# Patient Record
Sex: FEMALE | Race: OTHER | HISPANIC OR LATINO | URBAN - METROPOLITAN AREA
[De-identification: names, ages, dates, MRNs, and addresses within clinical notes are randomized per-mention and may not be internally consistent; named-entity substitution may affect disease eponyms.]

---

## 2019-02-01 ENCOUNTER — OUTPATIENT (OUTPATIENT)
Dept: OUTPATIENT SERVICES | Facility: HOSPITAL | Age: 59
LOS: 1 days | End: 2019-02-01
Payer: MEDICAID

## 2019-02-01 PROCEDURE — G9001: CPT

## 2019-02-20 ENCOUNTER — INPATIENT (INPATIENT)
Facility: HOSPITAL | Age: 59
LOS: 6 days | Discharge: HOME | DRG: 24 | End: 2019-02-27
Attending: INTERNAL MEDICINE | Admitting: INTERNAL MEDICINE
Payer: MEDICAID

## 2019-02-20 VITALS
SYSTOLIC BLOOD PRESSURE: 133 MMHG | DIASTOLIC BLOOD PRESSURE: 82 MMHG | RESPIRATION RATE: 18 BRPM | OXYGEN SATURATION: 98 % | HEART RATE: 53 BPM | WEIGHT: 167.99 LBS | TEMPERATURE: 98 F

## 2019-02-20 DIAGNOSIS — Z29.9 ENCOUNTER FOR PROPHYLACTIC MEASURES, UNSPECIFIED: ICD-10-CM

## 2019-02-20 DIAGNOSIS — Z90.49 ACQUIRED ABSENCE OF OTHER SPECIFIED PARTS OF DIGESTIVE TRACT: Chronic | ICD-10-CM

## 2019-02-20 DIAGNOSIS — F17.200 NICOTINE DEPENDENCE, UNSPECIFIED, UNCOMPLICATED: ICD-10-CM

## 2019-02-20 DIAGNOSIS — I10 ESSENTIAL (PRIMARY) HYPERTENSION: ICD-10-CM

## 2019-02-20 DIAGNOSIS — R20.0 ANESTHESIA OF SKIN: ICD-10-CM

## 2019-02-20 DIAGNOSIS — I25.10 ATHEROSCLEROTIC HEART DISEASE OF NATIVE CORONARY ARTERY WITHOUT ANGINA PECTORIS: ICD-10-CM

## 2019-02-20 DIAGNOSIS — I48.91 UNSPECIFIED ATRIAL FIBRILLATION: ICD-10-CM

## 2019-02-20 LAB
ALBUMIN SERPL ELPH-MCNC: 4.5 G/DL — SIGNIFICANT CHANGE UP (ref 3.3–5)
ALP SERPL-CCNC: 68 U/L — SIGNIFICANT CHANGE UP (ref 40–120)
ALT FLD-CCNC: 13 U/L — SIGNIFICANT CHANGE UP (ref 10–45)
ANION GAP SERPL CALC-SCNC: 13 MMOL/L — SIGNIFICANT CHANGE UP (ref 5–17)
APTT BLD: 32.1 SEC — SIGNIFICANT CHANGE UP (ref 27.5–36.3)
AST SERPL-CCNC: 18 U/L — SIGNIFICANT CHANGE UP (ref 10–40)
BASOPHILS # BLD AUTO: 0.1 K/UL — SIGNIFICANT CHANGE UP (ref 0–0.2)
BASOPHILS NFR BLD AUTO: 1.2 % — SIGNIFICANT CHANGE UP (ref 0–2)
BILIRUB SERPL-MCNC: 0.2 MG/DL — SIGNIFICANT CHANGE UP (ref 0.2–1.2)
BUN SERPL-MCNC: 13 MG/DL — SIGNIFICANT CHANGE UP (ref 7–23)
CALCIUM SERPL-MCNC: 9.5 MG/DL — SIGNIFICANT CHANGE UP (ref 8.4–10.5)
CHLORIDE SERPL-SCNC: 103 MMOL/L — SIGNIFICANT CHANGE UP (ref 96–108)
CO2 SERPL-SCNC: 25 MMOL/L — SIGNIFICANT CHANGE UP (ref 22–31)
CREAT SERPL-MCNC: 0.6 MG/DL — SIGNIFICANT CHANGE UP (ref 0.5–1.3)
EOSINOPHIL # BLD AUTO: 0.1 K/UL — SIGNIFICANT CHANGE UP (ref 0–0.5)
EOSINOPHIL NFR BLD AUTO: 1.7 % — SIGNIFICANT CHANGE UP (ref 0–6)
GLUCOSE SERPL-MCNC: 106 MG/DL — HIGH (ref 70–99)
HCG SERPL-ACNC: <2 MIU/ML — SIGNIFICANT CHANGE UP
HCT VFR BLD CALC: 35.9 % — SIGNIFICANT CHANGE UP (ref 34.5–45)
HGB BLD-MCNC: 12.4 G/DL — SIGNIFICANT CHANGE UP (ref 11.5–15.5)
INR BLD: 0.97 RATIO — SIGNIFICANT CHANGE UP (ref 0.88–1.16)
LYMPHOCYTES # BLD AUTO: 2.8 K/UL — SIGNIFICANT CHANGE UP (ref 1–3.3)
LYMPHOCYTES # BLD AUTO: 48.2 % — HIGH (ref 13–44)
MCHC RBC-ENTMCNC: 30.1 PG — SIGNIFICANT CHANGE UP (ref 27–34)
MCHC RBC-ENTMCNC: 34.6 GM/DL — SIGNIFICANT CHANGE UP (ref 32–36)
MCV RBC AUTO: 86.9 FL — SIGNIFICANT CHANGE UP (ref 80–100)
MONOCYTES # BLD AUTO: 0.4 K/UL — SIGNIFICANT CHANGE UP (ref 0–0.9)
MONOCYTES NFR BLD AUTO: 7.2 % — SIGNIFICANT CHANGE UP (ref 2–14)
NEUTROPHILS # BLD AUTO: 2.4 K/UL — SIGNIFICANT CHANGE UP (ref 1.8–7.4)
NEUTROPHILS NFR BLD AUTO: 41.8 % — LOW (ref 43–77)
PLATELET # BLD AUTO: 168 K/UL — SIGNIFICANT CHANGE UP (ref 150–400)
POTASSIUM SERPL-MCNC: 3.9 MMOL/L — SIGNIFICANT CHANGE UP (ref 3.5–5.3)
POTASSIUM SERPL-SCNC: 3.9 MMOL/L — SIGNIFICANT CHANGE UP (ref 3.5–5.3)
PROT SERPL-MCNC: 7 G/DL — SIGNIFICANT CHANGE UP (ref 6–8.3)
PROTHROM AB SERPL-ACNC: 11.2 SEC — SIGNIFICANT CHANGE UP (ref 10–12.9)
RBC # BLD: 4.13 M/UL — SIGNIFICANT CHANGE UP (ref 3.8–5.2)
RBC # FLD: 11.8 % — SIGNIFICANT CHANGE UP (ref 10.3–14.5)
SODIUM SERPL-SCNC: 141 MMOL/L — SIGNIFICANT CHANGE UP (ref 135–145)
TROPONIN T, HIGH SENSITIVITY RESULT: <6 NG/L — SIGNIFICANT CHANGE UP (ref 0–51)
WBC # BLD: 5.8 K/UL — SIGNIFICANT CHANGE UP (ref 3.8–10.5)
WBC # FLD AUTO: 5.8 K/UL — SIGNIFICANT CHANGE UP (ref 3.8–10.5)

## 2019-02-20 PROCEDURE — 99406 BEHAV CHNG SMOKING 3-10 MIN: CPT

## 2019-02-20 PROCEDURE — 99285 EMERGENCY DEPT VISIT HI MDM: CPT | Mod: 25

## 2019-02-20 PROCEDURE — 99223 1ST HOSP IP/OBS HIGH 75: CPT | Mod: 25

## 2019-02-20 PROCEDURE — 93010 ELECTROCARDIOGRAM REPORT: CPT

## 2019-02-20 PROCEDURE — 70450 CT HEAD/BRAIN W/O DYE: CPT | Mod: 26

## 2019-02-20 RX ORDER — ACETAMINOPHEN 500 MG
650 TABLET ORAL EVERY 6 HOURS
Qty: 0 | Refills: 0 | Status: DISCONTINUED | OUTPATIENT
Start: 2019-02-20 | End: 2019-02-27

## 2019-02-20 RX ORDER — HEPARIN SODIUM 5000 [USP'U]/ML
5000 INJECTION INTRAVENOUS; SUBCUTANEOUS EVERY 12 HOURS
Qty: 0 | Refills: 0 | Status: DISCONTINUED | OUTPATIENT
Start: 2019-02-20 | End: 2019-02-27

## 2019-02-20 RX ORDER — ASPIRIN/CALCIUM CARB/MAGNESIUM 324 MG
81 TABLET ORAL DAILY
Qty: 0 | Refills: 0 | Status: DISCONTINUED | OUTPATIENT
Start: 2019-02-20 | End: 2019-02-22

## 2019-02-20 RX ORDER — FAMOTIDINE 10 MG/ML
20 INJECTION INTRAVENOUS
Qty: 0 | Refills: 0 | Status: DISCONTINUED | OUTPATIENT
Start: 2019-02-20 | End: 2019-02-27

## 2019-02-20 RX ORDER — ATORVASTATIN CALCIUM 80 MG/1
80 TABLET, FILM COATED ORAL AT BEDTIME
Qty: 0 | Refills: 0 | Status: DISCONTINUED | OUTPATIENT
Start: 2019-02-20 | End: 2019-02-27

## 2019-02-20 NOTE — ED ADULT NURSE NOTE - OBJECTIVE STATEMENT
Pt presents to ED awake and alert, brought in by EMS from cardiologist's office. Pt states on Friday (5 days ago) she noticed her tongue was numb. This progressed to right sided facial numbness- pt states it feels like her face is sleeping. Progressed into right arm numbness and weakness. Pt also reports yesterday she had saliva dripping out of the right corner of her mouth. Pt denies difficulty walking. Speech is clear and coherent. Respirations even and unlabored. Pt takes aspirin for h/o MI.

## 2019-02-20 NOTE — H&P ADULT - FAMILY HISTORY
Father  Still living? Unknown  Family history of acute myocardial infarction, Age at diagnosis: Age Unknown     Sibling  Still living? Unknown  Family history of brain cancer, Age at diagnosis: Age Unknown

## 2019-02-20 NOTE — H&P ADULT - PROBLEM SELECTOR PLAN 6
I personally provided 5 minutes of smoking cessation counseling , risks of smoking and benefits of quitting discussed, patient declined patch at this time since amount of nicotine consumption is minimal and does not necessitate replacement therapy

## 2019-02-20 NOTE — H&P ADULT - PROBLEM SELECTOR PLAN 4
rate controlled , not on coumadin for reported bleeding ,    - day team  discuss with patients cardiologist if patient may benefit from resuming anticoagulation in the context of neurologic symptoms

## 2019-02-20 NOTE — H&P ADULT - ASSESSMENT
58 F w/ afib ( off coumadin), HLD , cad s/p stent x  1,  htn,  p/w right sided numbness and weakness , CT head w/ cerebral calcifications.

## 2019-02-20 NOTE — ED PROVIDER NOTE - ATTENDING CONTRIBUTION TO CARE
Jocelyn Andre MD - Attending Physician: I have personally seen and examined this patient with the resident/fellow.  I have fully participated in the care of this patient. I have reviewed all pertinent clinical information, including history, physical exam, plan and the Resident/Fellow’s note and agree except as noted. See MDM

## 2019-02-20 NOTE — H&P ADULT - PROBLEM SELECTOR PLAN 1
CTH w/o acute findings, there are calcification which may indicate infection , however poorly correlated with presentation   - MRI brain   - MRA head and neck   - b12 / syphilis screen / tsh   - Neurology consult - to be arranged by day team

## 2019-02-20 NOTE — H&P ADULT - NSHPPHYSICALEXAM_GEN_ALL_CORE
Vital Signs Last 24 Hrs  T(C): 36.6 (20 Feb 2019 20:52), Max: 36.7 (20 Feb 2019 18:33)  T(F): 97.9 (20 Feb 2019 20:52), Max: 98.1 (20 Feb 2019 18:33)  HR: 52 (20 Feb 2019 20:52) (52 - 53)  BP: 129/82 (20 Feb 2019 20:52) (129/82 - 133/82)  BP(mean): --  RR: 16 (20 Feb 2019 20:52) (16 - 18)  SpO2: 99% (20 Feb 2019 20:52) (98% - 99%) Vital Signs Last 24 Hrs  T(C): 36.6 (20 Feb 2019 20:52), Max: 36.7 (20 Feb 2019 18:33)  T(F): 97.9 (20 Feb 2019 20:52), Max: 98.1 (20 Feb 2019 18:33)  HR: 52 (20 Feb 2019 20:52) (52 - 53)  BP: 129/82 (20 Feb 2019 20:52) (129/82 - 133/82)  BP(mean): --  RR: 16 (20 Feb 2019 20:52) (16 - 18)  SpO2: 99% (20 Feb 2019 20:52) (98% - 99%)    GENERAL: No acute distress, well-developed  HEAD:  Atraumatic, Normocephalic  ENT: EOMI, PERRLA, conjunctiva and sclera clear,  moist mucosa no pharyngeal erythema or exudates   NECK: supple , no JVD   CHEST/LUNG: Clear to auscultation bilaterally; No wheeze, equal breath sounds bilaterally   BACK: No spinal tenderness,  No CVA tenderness   HEART: Regular rate and rhythm; No murmurs, rubs, or gallops  ABDOMEN: Soft, Nontender, Nondistended; Bowel sounds present  EXTREMITIES:  No clubbing, cyanosis, or edema  MSK: No joint swelling or effusions, ROM intact   PSYCH: Normal behavior/affect  NEUROLOGY: AAOx3, cranial nerves intact no facial asymmetry , motor 5/5 on left throughout ,4/5 on right throughout , finger to nose intact ,  decreased sensation to blunt touch on right face, upper and lower extremity  SKIN: Normal color, No rashes or lesions

## 2019-02-20 NOTE — H&P ADULT - NSHPLABSRESULTS_GEN_ALL_CORE
Labs personally reviewed:                          12.4   5.8   )-----------( 168      ( 20 Feb 2019 20:07 )             35.9     02-20    141  |  103  |  13  ----------------------------<  106<H>  3.9   |  25  |  0.60    Ca    9.5      20 Feb 2019 20:07    TPro  7.0  /  Alb  4.5  /  TBili  0.2  /  DBili  x   /  AST  18  /  ALT  13  /  AlkPhos  68  02-20        LIVER FUNCTIONS - ( 20 Feb 2019 20:07 )  Alb: 4.5 g/dL / Pro: 7.0 g/dL / ALK PHOS: 68 U/L / ALT: 13 U/L / AST: 18 U/L / GGT: x           PT/INR - ( 20 Feb 2019 20:07 )   PT: 11.2 sec;   INR: 0.97 ratio         PTT - ( 20 Feb 2019 20:07 )  PTT:32.1 sec    CAPILLARY BLOOD GLUCOSE          Imaging:  CTH: No acute intracranial hemorrhage, mass effect, or other acute   intracranial pathology.    Multiple punctate calcifications in the cerebral parenchyma, which are   nonspecific, and may be secondary to infectious and/or inflammatory   etiologies including neurocysticercosis. MR may better assess.      EKG personally reviewed: nsr, no acute st changes Labs personally reviewed:                          12.4   5.8   )-----------( 168      ( 20 Feb 2019 20:07 )             35.9     02-20    141  |  103  |  13  ----------------------------<  106<H>  3.9   |  25  |  0.60    Ca    9.5      20 Feb 2019 20:07    TPro  7.0  /  Alb  4.5  /  TBili  0.2  /  DBili  x   /  AST  18  /  ALT  13  /  AlkPhos  68  02-20        LIVER FUNCTIONS - ( 20 Feb 2019 20:07 )  Alb: 4.5 g/dL / Pro: 7.0 g/dL / ALK PHOS: 68 U/L / ALT: 13 U/L / AST: 18 U/L / GGT: x           PT/INR - ( 20 Feb 2019 20:07 )   PT: 11.2 sec;   INR: 0.97 ratio         PTT - ( 20 Feb 2019 20:07 )  PTT:32.1 sec    CAPILLARY BLOOD GLUCOSE          Imaging:  CTH: No acute intracranial hemorrhage, mass effect, or other acute   intracranial pathology.    Multiple punctate calcifications in the cerebral parenchyma, which are   nonspecific, and may be secondary to infectious and/or inflammatory   etiologies including neurocysticercosis. MR may better assess.    MR brain - ordered    MR angio head and neck - ordered  EKG personally reviewed: no acute st changes

## 2019-02-20 NOTE — ED PROVIDER NOTE - CLINICAL SUMMARY MEDICAL DECISION MAKING FREE TEXT BOX
58y Female complaining of numbness, concern for stroke with right sided motor and sensory deficits, will get ct head r/o bleed or mass, labs, ekg, outpatient followup with dr. Soriano pcp since sx outside acute stroke workup 58y Female complaining of numbness, concern for stroke with right sided motor and sensory deficits, will get ct head r/o bleed or mass, labs, ekg, outpatient followup with dr. Soriano pcp since sx outside acute stroke workup    Jocelyn Andre MD - Attending Physician: Pt here with unilateral numbness, now spreading. Noted objective motor weakness. Outside stroke window. Labs, ekg, CT, pmd

## 2019-02-20 NOTE — ED PROVIDER NOTE - NS ED ROS FT
ROS:  GENERAL: No fever, no chills  EYES: no change in vision  HEENT: no trouble swallowing, no trouble speaking  CARDIAC: no chest pain  PULMONARY: no cough, no shortness of breath  GI: no abdominal pain, no nausea, no vomiting, no diarrhea, no constipation  : No dysuria, no frequency, no change in appearance, or odor of urine  SKIN: no rashes  NEURO: no headache, RUE/R facial weakness  MSK: No joint pain  ~Home Aguirre D.O. -Resident ROS:  GENERAL: No fever, no chills  EYES: no change in vision  HEENT: no trouble swallowing, no trouble speaking  CARDIAC: no chest pain  PULMONARY: no cough, no shortness of breath  GI: no abdominal pain, no nausea, no vomiting, no diarrhea, no constipation  : No dysuria, no frequency, no change in appearance, or odor of urine  SKIN: no rashes  NEURO: no headache, RUE/R facial weakness  MSK: No joint pain  ~Home Aguirre D.O. -Resident    All other systems negative

## 2019-02-20 NOTE — ED PROVIDER NOTE - PHYSICAL EXAMINATION
Physical Exam:  Gen: NAD, AOx3, non-toxic appearing, able to ambulate without assistance with left sided favored gait  Head: NCAT  HEENT: EOMI, PEERLA, normal conjunctiva, tongue midline, oral mucosa moist  Lung: CTAB, no respiratory distress, no wheezes/rhonchi/rales B/L, speaking in full sentences  CV: RRR, no murmurs, rubs or gallops  Abd: soft, NTND, no guarding, no rigidity, no CVA tenderness   MSK: no visible deformities, ROM normal in UE/LE, no back pain  Neuro: Right facial numbness along entire face, no facial droop, motor intact in face, right upper extremity 4/5 strength, decreased sensation along entire arm, RLE 4/5 strength, sensation equal to LLE, rapid alternating movements delayed on right side, finger to nose testing delayed effort with right hand, no rhomberg  Skin: Warm, well perfused, no rash  Psych: normal affect, calm  ~Home Aguirre D.O. -Resident

## 2019-02-20 NOTE — H&P ADULT - NSHPSOCIALHISTORY_GEN_ALL_CORE
Social History:    Marital Status:  ( x  )    (   ) Single    (   )    (  )   Occupation:   Lives with: (  ) alone  ( x ) children   ( x ) spouse   (  ) parents  (  ) other    Substance Use (street drugs): ( x) never used  (  ) other:  Tobacco Usage:  (   ) never smoked   (   ) former smoker   (  x ) current smoker , few cigarettes per week (     ) pack year  (        ) last cigarette date  Alcohol Usage: no etoh use

## 2019-02-20 NOTE — ED PROVIDER NOTE - PROGRESS NOTE DETAILS
called Dr. Pete Costello called Dr. Pete Costello (pcp) left message for callback called Dr. kendra clayton [phone], left message for callback called Dr. kendra clayton [phone], discussed care and would like to admit patient to his service for further workup and care, will callback with results and admit called Dr. kendra clayton [phone], discussed care and would like to admit patient to his service called Dr. kendra clayton [phone], discussed CT results and would like to admit patient to his service

## 2019-02-20 NOTE — H&P ADULT - NSHPREVIEWOFSYSTEMS_GEN_ALL_CORE
CONSTITUTIONAL: No weakness, fevers or chills  EYES/ENT: No visual changes;  No dysphagia  NECK: No pain or stiffness  RESPIRATORY: No cough, wheezing, hemoptysis; No shortness of breath  CARDIOVASCULAR: No chest pain or palpitations; No lower extremity edema  EXTREMITIES: no le edema, cyanosis, clubbing  GASTROINTESTINAL: No abdominal or epigastric pain. No nausea, vomiting, or hematemesis; No diarrhea or constipation. No melena or hematochezia.  BACK: No back pain  GENITOURINARY: No dysuria, frequency or hematuria  NEUROLOGICAL:+ numbness + weakness  MSK: no joint swelling or pain  SKIN: No itching, burning, rashes, or lesions   PSYCH: no agitation  All other review of systems is negative unless indicated above.

## 2019-02-20 NOTE — ED PROVIDER NOTE - OBJECTIVE STATEMENT
59yo f pmh afib (stopped coumadin 6months ago) cad, htn, 1x stent, pw right sided facial numbness since friday. patient reports waking up friday morning with right side of her tongue numbness, reports progressively worsening throughout the day friday with eventual right sided facial numbness, denies difficultly speaking or swallowing. reports saturday morning waking up with right sided arm numbness and weakness. Since then sx have been constant and decided to see cardiologist today and was sent in for eval. Reports mild gait instability and only sx on the right. denies vision changes, headaches, recent trauma, loc, falls, chest pain, shortness of breath, abdominal pain, n/v/d/c/f/c, neck pain back pain. denies family history of blood clots or previous blood clots, recent travel. 59yo f pmh afib (stopped coumadin 6months ago) cad, htn, 1x stent, pw right sided facial numbness since friday. patient reports waking up friday morning with right side of her tongue numbness, reports progressively worsening throughout the day friday with eventual right sided facial numbness, denies difficultly speaking or swallowing. reports saturday morning waking up with right sided arm numbness and weakness. Since then sx have been constant and decided to see cardiologist today and was sent in for eval. Reports mild gait instability and only sx on the right. denies vision changes, headaches, recent trauma, loc, falls, chest pain, shortness of breath, abdominal pain, n/v/d/c/f/c, neck pain back pain. denies family history of blood clots or previous blood clots, recent travel.    pcp Dr. Pete Costello 59yo f pmh afib (stopped coumadin 6months ago) cad, htn, 1x stent, pw right sided facial numbness since friday. patient reports waking up friday morning with right side of her tongue numbness, reports progressively worsening throughout the day friday with eventual right sided facial numbness, denies difficultly speaking or swallowing. reports saturday morning waking up with right sided arm numbness and weakness. Since then sx have been constant and decided to see cardiologist today and was sent in for eval. Reports mild gait instability and only sx on the right. denies vision changes, headaches, recent trauma, loc, falls, chest pain, shortness of breath, abdominal pain, n/v/d/c/f/c, neck pain back pain. denies family history of blood clots or previous blood clots, recent travel.    pcp Dr. Pete Costello  cards- Dr. kendra clayton

## 2019-02-20 NOTE — H&P ADULT - HISTORY OF PRESENT ILLNESS
Patient is a 58 year old female  with a past medical history significant for afib ( off coumadin) cad s/p stent x  1,  htn,  presents with right facial numbness for the past five days.   Patient reports waking up five days prior to admission with numbness of the right side of her tongue , symptoms continued to progress throughout the day and now include  most of the face as well as the right arm, she has associated right arm weakness.   She reports no dysarthria and no dysphagia .   on day of admission, patient was evaluated by her cardiologist for routine visit and was subsequently referred to the hospital for further management. Patient reports no fever or chills , no visual changes,  and no headaches. She has no chest pain, shortness of breath, or palpitations. She reports no neck pain or back pain. Patient is a 58 year old female  with a past medical history significant for afib ( off coumadin) cad s/p stent x  1,  htn,  presents with right sided  numbness for the past five days.   Patient reports waking up five days prior to admission with numbness of the right side of her tongue , symptoms continued to progress over the next few days and now include  most of the face as well as the right arm, and leg , she has associated right arm and leg weakness.   She reports it feels like " its asleep" no tingling  or pins and needles . She reports no dysarthria and no dysphagia .   on day of admission, patient was evaluated by her cardiologist for routine visit and was subsequently referred to the hospital for further management. Patient reports no fever or chills , no visual changes,  and no headaches. She has no chest pain, shortness of breath, or palpitations. She reports no neck pain or back pain. She reports no recent illness and no sick contacts. No recent travel.

## 2019-02-21 DIAGNOSIS — R20.0 ANESTHESIA OF SKIN: ICD-10-CM

## 2019-02-21 LAB
ALBUMIN SERPL ELPH-MCNC: 4.2 G/DL — SIGNIFICANT CHANGE UP (ref 3.3–5)
ALP SERPL-CCNC: 65 U/L — SIGNIFICANT CHANGE UP (ref 40–120)
ALT FLD-CCNC: 12 U/L — SIGNIFICANT CHANGE UP (ref 10–45)
ANION GAP SERPL CALC-SCNC: 12 MMOL/L — SIGNIFICANT CHANGE UP (ref 5–17)
AST SERPL-CCNC: 14 U/L — SIGNIFICANT CHANGE UP (ref 10–40)
BASOPHILS # BLD AUTO: 0.03 K/UL — SIGNIFICANT CHANGE UP (ref 0–0.2)
BASOPHILS NFR BLD AUTO: 0.6 % — SIGNIFICANT CHANGE UP (ref 0–2)
BILIRUB SERPL-MCNC: 0.3 MG/DL — SIGNIFICANT CHANGE UP (ref 0.2–1.2)
BUN SERPL-MCNC: 16 MG/DL — SIGNIFICANT CHANGE UP (ref 7–23)
CALCIUM SERPL-MCNC: 9.3 MG/DL — SIGNIFICANT CHANGE UP (ref 8.4–10.5)
CHLORIDE SERPL-SCNC: 105 MMOL/L — SIGNIFICANT CHANGE UP (ref 96–108)
CO2 SERPL-SCNC: 25 MMOL/L — SIGNIFICANT CHANGE UP (ref 22–31)
CREAT SERPL-MCNC: 0.54 MG/DL — SIGNIFICANT CHANGE UP (ref 0.5–1.3)
EOSINOPHIL # BLD AUTO: 0.11 K/UL — SIGNIFICANT CHANGE UP (ref 0–0.5)
EOSINOPHIL NFR BLD AUTO: 2.1 % — SIGNIFICANT CHANGE UP (ref 0–6)
GLUCOSE SERPL-MCNC: 107 MG/DL — HIGH (ref 70–99)
HBA1C BLD-MCNC: 5.7 % — HIGH (ref 4–5.6)
HCT VFR BLD CALC: 37.1 % — SIGNIFICANT CHANGE UP (ref 34.5–45)
HGB BLD-MCNC: 11.8 G/DL — SIGNIFICANT CHANGE UP (ref 11.5–15.5)
IMM GRANULOCYTES NFR BLD AUTO: 0 % — SIGNIFICANT CHANGE UP (ref 0–1.5)
LYMPHOCYTES # BLD AUTO: 2.54 K/UL — SIGNIFICANT CHANGE UP (ref 1–3.3)
LYMPHOCYTES # BLD AUTO: 47.7 % — HIGH (ref 13–44)
MCHC RBC-ENTMCNC: 28.9 PG — SIGNIFICANT CHANGE UP (ref 27–34)
MCHC RBC-ENTMCNC: 31.8 GM/DL — LOW (ref 32–36)
MCV RBC AUTO: 90.9 FL — SIGNIFICANT CHANGE UP (ref 80–100)
MONOCYTES # BLD AUTO: 0.4 K/UL — SIGNIFICANT CHANGE UP (ref 0–0.9)
MONOCYTES NFR BLD AUTO: 7.5 % — SIGNIFICANT CHANGE UP (ref 2–14)
NEUTROPHILS # BLD AUTO: 2.25 K/UL — SIGNIFICANT CHANGE UP (ref 1.8–7.4)
NEUTROPHILS NFR BLD AUTO: 42.1 % — LOW (ref 43–77)
PLATELET # BLD AUTO: 165 K/UL — SIGNIFICANT CHANGE UP (ref 150–400)
POTASSIUM SERPL-MCNC: 3.8 MMOL/L — SIGNIFICANT CHANGE UP (ref 3.5–5.3)
POTASSIUM SERPL-SCNC: 3.8 MMOL/L — SIGNIFICANT CHANGE UP (ref 3.5–5.3)
PROT SERPL-MCNC: 6.5 G/DL — SIGNIFICANT CHANGE UP (ref 6–8.3)
RBC # BLD: 4.08 M/UL — SIGNIFICANT CHANGE UP (ref 3.8–5.2)
RBC # FLD: 13.1 % — SIGNIFICANT CHANGE UP (ref 10.3–14.5)
SODIUM SERPL-SCNC: 142 MMOL/L — SIGNIFICANT CHANGE UP (ref 135–145)
T PALLIDUM AB TITR SER: NEGATIVE — SIGNIFICANT CHANGE UP
TSH SERPL-MCNC: 2.33 UIU/ML — SIGNIFICANT CHANGE UP (ref 0.27–4.2)
VIT B12 SERPL-MCNC: 596 PG/ML — SIGNIFICANT CHANGE UP (ref 232–1245)
WBC # BLD: 5.33 K/UL — SIGNIFICANT CHANGE UP (ref 3.8–10.5)
WBC # FLD AUTO: 5.33 K/UL — SIGNIFICANT CHANGE UP (ref 3.8–10.5)

## 2019-02-21 PROCEDURE — 99223 1ST HOSP IP/OBS HIGH 75: CPT | Mod: GC

## 2019-02-21 RX ORDER — ACETAMINOPHEN 500 MG
650 TABLET ORAL ONCE
Qty: 0 | Refills: 0 | Status: COMPLETED | OUTPATIENT
Start: 2019-02-21 | End: 2019-02-21

## 2019-02-21 RX ORDER — INFLUENZA VIRUS VACCINE 15; 15; 15; 15 UG/.5ML; UG/.5ML; UG/.5ML; UG/.5ML
0.5 SUSPENSION INTRAMUSCULAR ONCE
Qty: 0 | Refills: 0 | Status: DISCONTINUED | OUTPATIENT
Start: 2019-02-21 | End: 2019-02-27

## 2019-02-21 RX ADMIN — ATORVASTATIN CALCIUM 80 MILLIGRAM(S): 80 TABLET, FILM COATED ORAL at 21:49

## 2019-02-21 RX ADMIN — HEPARIN SODIUM 5000 UNIT(S): 5000 INJECTION INTRAVENOUS; SUBCUTANEOUS at 17:45

## 2019-02-21 RX ADMIN — Medication 650 MILLIGRAM(S): at 10:40

## 2019-02-21 RX ADMIN — FAMOTIDINE 20 MILLIGRAM(S): 10 INJECTION INTRAVENOUS at 17:45

## 2019-02-21 RX ADMIN — Medication 650 MILLIGRAM(S): at 10:00

## 2019-02-21 RX ADMIN — FAMOTIDINE 20 MILLIGRAM(S): 10 INJECTION INTRAVENOUS at 05:08

## 2019-02-21 RX ADMIN — Medication 20 MILLIGRAM(S): at 05:08

## 2019-02-21 RX ADMIN — Medication 81 MILLIGRAM(S): at 11:26

## 2019-02-21 RX ADMIN — Medication 650 MILLIGRAM(S): at 13:40

## 2019-02-21 RX ADMIN — HEPARIN SODIUM 5000 UNIT(S): 5000 INJECTION INTRAVENOUS; SUBCUTANEOUS at 05:08

## 2019-02-21 RX ADMIN — Medication 650 MILLIGRAM(S): at 13:01

## 2019-02-21 RX ADMIN — Medication 650 MILLIGRAM(S): at 17:47

## 2019-02-21 NOTE — CONSULT NOTE ADULT - ASSESSMENT
Patient is a 58 year old female  with a past medical history significant for afib ( off coumadin) cad s/p stent x  1,  htn,  presents with right sided  numbness for the past five days. Patient reports waking up five days prior to admission with numbness of the right side of her tongue , symptoms continued to progress over the next few days and now include  most of the face as well as the right arm, and leg , she has associated right arm and leg weakness.  NIHSS 1. On exam pt has mildly decreased weakness of the right upper and lower extremity as well as decreased light touch sensation on the right side. Facial involvement of decreased LT sensation has resolved. Ct head shows Multiple punctate calcifications in the cerebral parenchyma, which are nonspecific, and may be secondary to infectious and/or inflammatory etiologies including neurocysticercosis    Impression - Right sided weakness and dysthesias 2/2 to possible left sided hemispheric dysfunction vs other etiology (migraine)     Plan:  MRI brain wo contrast   MRA head without contrast  MRA neck with contrast   C/w ASA 81mg daily   PT/OT

## 2019-02-21 NOTE — CONSULT NOTE ADULT - ASSESSMENT
58 F w/ afib ( off coumadin), HLD , cad s/p stent x  1,  htn,  p/w right sided numbness and weakness , CT head w/ cerebral calcifications.       Numbness on right side.    - CTH w/o acute findings, there are calcification which may indicate infection , however poorly correlated with presentation   - MRI brain   - MRA head and neck   - b12 / syphilis screen / tsh   - Neurology consult - to be arranged by day team.     CAD (coronary artery disease).   -  s/p stent   - c/w ASA.      HTN (hypertension).    - enalapril.     Afib.    -  rate controlled , not on coumadin for reported bleeding ,      Need for prophylactic measure.   -  sub q heparin for dvt ppx.      Nicotine dependence.   -  I personally provided 5 minutes of smoking cessation counseling , risks of smoking and benefits of quitting discussed, patient declined patch at this time since amount of nicotine consumption is minimal and does not necessitate replacement therapy.

## 2019-02-21 NOTE — CONSULT NOTE ADULT - ASSESSMENT
57 yo female admitted with facial and right sided numbness and weakness and found to have an abnormal CT head

## 2019-02-21 NOTE — CONSULT NOTE ADULT - SUBJECTIVE AND OBJECTIVE BOX
Patient is a 58 year old female  with a past medical history significant for afib ( off coumadin) cad s/p stent x  1,  htn,  presents with right sided  numbness for the past five days.   Patient reports waking up five days prior to admission with numbness of the right side of her tongue , symptoms continued to progress over the next few days and now include  most of the face as well as the right arm, and leg , she has associated right arm and leg weakness.   She reports it feels like " its asleep" no tingling  or pins and needles . She reports no dysarthria and no dysphagia .   on day of admission, patient was evaluated by her cardiologist for routine visit and was subsequently referred to the hospital for further management. Patient reports no fever or chills , no visual changes,  and no headaches. She has no chest pain, shortness of breath, or palpitations. She reports no neck pain or back pain. She reports no recent illness and no sick contacts. No recent travel. (20 Feb 2019 22:54)      PAST MEDICAL & SURGICAL HISTORY:  Afib  Stented coronary artery  HTN (hypertension)  S/P cholecystectomy  S/P appendectomy      Review of Systems:   CONSTITUTIONAL: No fever, weight loss, or fatigue  EYES: No eye pain, visual disturbances, or discharge  ENMT:  No difficulty hearing, tinnitus, vertigo; No sinus or throat pain  NECK: No pain or stiffness  BREASTS: No pain, masses, or nipple discharge  RESPIRATORY: No cough, wheezing, chills or hemoptysis; No shortness of breath  CARDIOVASCULAR: No chest pain, palpitations, dizziness, or leg swelling  GASTROINTESTINAL: No abdominal or epigastric pain. No nausea, vomiting, or hematemesis; No diarrhea or constipation. No melena or hematochezia.  GENITOURINARY: No dysuria, frequency, hematuria, or incontinence  NEUROLOGICAL: No headaches, memory loss, loss of strength, numbness, or tremors  SKIN: No itching, burning, rashes, or lesions   LYMPH NODES: No enlarged glands  ENDOCRINE: No heat or cold intolerance; No hair loss  MUSCULOSKELETAL: No joint pain or swelling; No muscle, back, or extremity pain  PSYCHIATRIC: No depression, anxiety, mood swings, or difficulty sleeping  HEME/LYMPH: No easy bruising, or bleeding gums  ALLERY AND IMMUNOLOGIC: No hives or eczema    Allergies    No Known Allergies    Intolerances        Social History:     FAMILY HISTORY:  Family history of brain cancer (Sibling)  Family history of acute myocardial infarction (Father)      MEDICATIONS  (STANDING):  aspirin enteric coated 81 milliGRAM(s) Oral daily  atorvastatin 80 milliGRAM(s) Oral at bedtime  enalapril 20 milliGRAM(s) Oral daily  famotidine    Tablet 20 milliGRAM(s) Oral two times a day  heparin  Injectable 5000 Unit(s) SubCutaneous every 12 hours    MEDICATIONS  (PRN):  acetaminophen   Tablet .. 650 milliGRAM(s) Oral every 6 hours PRN Mild Pain (1 - 3)      Vital Signs Last 24 Hrs  T(C): 36.6 (21 Feb 2019 08:20), Max: 36.7 (20 Feb 2019 18:33)  T(F): 97.8 (21 Feb 2019 08:20), Max: 98.1 (20 Feb 2019 18:33)  HR: 56 (21 Feb 2019 08:20) (52 - 63)  BP: 103/60 (21 Feb 2019 08:20) (98/60 - 144/80)  BP(mean): --  RR: 18 (21 Feb 2019 08:20) (16 - 18)  SpO2: 98% (21 Feb 2019 08:20) (95% - 99%)  CAPILLARY BLOOD GLUCOSE        I&O's Summary      PHYSICAL EXAM:  GENERAL: NAD, well-developed  HEAD:  Atraumatic, Normocephalic  EYES: EOMI, PERRLA, conjunctiva and sclera clear  NECK: Supple, No JVD  CHEST/LUNG: Clear to auscultation bilaterally; No wheeze  HEART: Regular rate and rhythm; No murmurs, rubs, or gallops  ABDOMEN: Soft, Nontender, Nondistended; Bowel sounds present  EXTREMITIES:  2+ Peripheral Pulses, No clubbing, cyanosis, or edema  PSYCH: AAOx3  NEUROLOGY: non-focal  SKIN: No rashes or lesions    LABS:                        12.4   5.8   )-----------( 168      ( 20 Feb 2019 20:07 )             35.9     02-21    142  |  105  |  16  ----------------------------<  107<H>  3.8   |  25  |  0.54    Ca    9.3      21 Feb 2019 05:16    TPro  6.5  /  Alb  4.2  /  TBili  0.3  /  DBili  x   /  AST  14  /  ALT  12  /  AlkPhos  65  02-21    PT/INR - ( 20 Feb 2019 20:07 )   PT: 11.2 sec;   INR: 0.97 ratio         PTT - ( 20 Feb 2019 20:07 )  PTT:32.1 sec          RADIOLOGY & ADDITIONAL TESTS:    Imaging Personally Reviewed:    Consultant(s) Notes Reviewed:      Care Discussed with Consultants/Other Providers:

## 2019-02-21 NOTE — CONSULT NOTE ADULT - SUBJECTIVE AND OBJECTIVE BOX
CHIEF COMPLAINT:  Facial numbness and right sided weakness     HISTORY OF PRESENT ILLNESS:   58 year old female  with a past medical history significant  cad s/p PCI, tn,  seen in my office yesterday for complaints of right sided facial numbness, drooling and right sided weakness for the past five days.   Patient reports waking up five days prior to admission with numbness of the right side of her tongue , symptoms continued to progress over the next few days and now include  most of the face as well as the right arm, and leg , she has associated right arm and leg weakness.   She reports it feels like " its asleep" no tingling  or pins and needles . She reports no dysarthria and no dysphagia .   on day of admission, patient was evaluated by her cardiologist for routine visit and was subsequently referred to the hospital for further management. Patient reports no fever or chills , no visual changes,  and no headaches. She has no chest pain, shortness of breath, or palpitations. She reports no neck pain or back pain. She reports no recent illness and no sick contacts. No recent travel.       PAST MEDICAL & SURGICAL HISTORY:  Afib  Stented coronary artery  HTN (hypertension)  S/P cholecystectomy  S/P appendectomy          MEDICATIONS:  aspirin enteric coated 81 milliGRAM(s) Oral daily  enalapril 20 milliGRAM(s) Oral daily  heparin  Injectable 5000 Unit(s) SubCutaneous every 12 hours        acetaminophen   Tablet .. 650 milliGRAM(s) Oral every 6 hours PRN    famotidine    Tablet 20 milliGRAM(s) Oral two times a day    atorvastatin 80 milliGRAM(s) Oral at bedtime        FAMILY HISTORY:  Family history of brain cancer (Sibling)  Family history of acute myocardial infarction (Father)      SOCIAL HISTORY:    [ ] Non-smoker  [ ] Smoker  [ ] Alcohol    Allergies    No Known Allergies    Intolerances    	    REVIEW OF SYSTEMS:  CONSTITUTIONAL: No fever, weight loss, + fatigue  EYES: No eye pain, visual disturbances, or discharge  ENMT:  No difficulty hearing, tinnitus, vertigo; No sinus or throat pain  NECK: No pain or stiffness  RESPIRATORY: No cough, wheezing, chills or hemoptysis; No Shortness of Breath  CARDIOVASCULAR: No chest pain, palpitations, passing out, dizziness, or leg swelling  GASTROINTESTINAL: No abdominal or epigastric pain. No nausea, vomiting, or hematemesis; No diarrhea or constipation. No melena or hematochezia.  GENITOURINARY: No dysuria, frequency, hematuria, or incontinence  NEUROLOGICAL: No headaches, memory loss, +loss of strength, numbness, or tremors  SKIN: No itching, burning, rashes, or lesions   LYMPH Nodes: No enlarged glands  ENDOCRINE: No heat or cold intolerance; No hair loss  MUSCULOSKELETAL: No joint pain or swelling; No muscle, back, or extremity pain  PSYCHIATRIC: No depression, anxiety, mood swings, or difficulty sleeping  HEME/LYMPH: No easy bruising, or bleeding gums  ALLERY AND IMMUNOLOGIC: No hives or eczema	    [ ] All others negative	  [ ] Unable to obtain    PHYSICAL EXAM:  T(C): 36.6 (02-21-19 @ 08:20), Max: 36.7 (02-20-19 @ 18:33)  HR: 56 (02-21-19 @ 08:20) (52 - 63)  BP: 103/60 (02-21-19 @ 08:20) (98/60 - 144/80)  RR: 18 (02-21-19 @ 08:20) (16 - 18)  SpO2: 98% (02-21-19 @ 08:20) (95% - 99%)  Wt(kg): --  I&O's Summary      Appearance: Normal	  HEENT:   Normal oral mucosa, PERRL, EOMI	  Lymphatic: No lymphadenopathy  Cardiovascular: Normal S1 S2, No JVD, No murmurs, No edema  Respiratory: Lungs clear to auscultation	  Psychiatry: A & O x 3, Mood & affect appropriate  Gastrointestinal:  Soft, Non-tender, + BS	  Skin: No rashes, No ecchymoses, No cyanosis	  Neurologic: right facial numbness and droop, right sided weakness  Extremities: Normal range of motion, No clubbing, cyanosis or edema  Vascular: Peripheral pulses palpable 2+ bilaterally    TELEMETRY: 	    ECG:  NSR, no acute ischemic stt changes 	  RADIOLOGY:  < from: CT Head No Cont (02.20.19 @ 20:28) >    EXAM:  CT BRAIN                            PROCEDURE DATE:  02/20/2019            INTERPRETATION:  HISTORY: Right-sided facial numbness. Right upper   extremity and right lower extremity numbness. Focal neurologic deficit   greater than 6 hours.    Technique: CT of the head was performed without contrast.    Multiple contiguous axial images were acquired from the skullbase to the   vertex without the administration of intravenous contrast.  Coronal and   sagittal reformations were made.    COMPARISON: No prior head CT available for comparison.    FINDINGS:  Prominence of the ventricles and sulci consistent with age-related   cerebral volume loss. Patchy areas of hypoattenuation within the white   matter consistent with mild chronic microvascular changes. There are   multiple punctate calcifications throughout the cerebral parenchyma   suggest collision with prior history of any infectious and/or   inflammatory etiologies, improved assessment the MRI it extends warranted   in patient with right-sided facial numbness. There is no intracranial   hematoma or midline shift. No abnormal extra-axial fluid collections are   present. Basal cisterns appear patent.    The calvarium is intact. The orbits, paranasal sinuses and mastoid   complexes appear free of acute disease.    IMPRESSION:  No acute intracranial hemorrhage, mass effect, or other acute   intracranial pathology.    Multiple punctate calcifications in the cerebral parenchyma, which are   nonspecific, and may be secondary to infectious and/or inflammatory   etiologies including neurocysticercosis. MR may better assess.                DARRELL GRESHAM M.D., RADIOLOGY RESIDENT  This document has been electronically signed.  EMMA WALLER M.D., ATTENDING RADIOLOGIST  This document has been electronically signed. Feb 20 2019  9:23PM                < end of copied text >    OTHER: 	  	  LABS:	 	    CARDIAC MARKERS:                                  12.4   5.8   )-----------( 168      ( 20 Feb 2019 20:07 )             35.9     02-21    142  |  105  |  16  ----------------------------<  107<H>  3.8   |  25  |  0.54    Ca    9.3      21 Feb 2019 05:16    TPro  6.5  /  Alb  4.2  /  TBili  0.3  /  DBili  x   /  AST  14  /  ALT  12  /  AlkPhos  65  02-21    proBNP:   Lipid Profile:   HgA1c:   TSH:

## 2019-02-22 DIAGNOSIS — R10.9 UNSPECIFIED ABDOMINAL PAIN: ICD-10-CM

## 2019-02-22 PROCEDURE — ZZZZZ: CPT

## 2019-02-22 PROCEDURE — 70551 MRI BRAIN STEM W/O DYE: CPT | Mod: 26

## 2019-02-22 PROCEDURE — 99222 1ST HOSP IP/OBS MODERATE 55: CPT

## 2019-02-22 PROCEDURE — 70549 MR ANGIOGRAPH NECK W/O&W/DYE: CPT | Mod: 26

## 2019-02-22 RX ORDER — DOCUSATE SODIUM 100 MG
100 CAPSULE ORAL THREE TIMES A DAY
Qty: 0 | Refills: 0 | Status: DISCONTINUED | OUTPATIENT
Start: 2019-02-22 | End: 2019-02-27

## 2019-02-22 RX ORDER — POLYETHYLENE GLYCOL 3350 17 G/17G
17 POWDER, FOR SOLUTION ORAL DAILY
Qty: 0 | Refills: 0 | Status: DISCONTINUED | OUTPATIENT
Start: 2019-02-22 | End: 2019-02-27

## 2019-02-22 RX ORDER — ASPIRIN/CALCIUM CARB/MAGNESIUM 324 MG
325 TABLET ORAL DAILY
Qty: 0 | Refills: 0 | Status: DISCONTINUED | OUTPATIENT
Start: 2019-02-22 | End: 2019-02-27

## 2019-02-22 RX ORDER — CLOPIDOGREL BISULFATE 75 MG/1
300 TABLET, FILM COATED ORAL ONCE
Qty: 0 | Refills: 0 | Status: COMPLETED | OUTPATIENT
Start: 2019-02-22 | End: 2019-02-22

## 2019-02-22 RX ORDER — SENNA PLUS 8.6 MG/1
2 TABLET ORAL AT BEDTIME
Qty: 0 | Refills: 0 | Status: DISCONTINUED | OUTPATIENT
Start: 2019-02-22 | End: 2019-02-27

## 2019-02-22 RX ORDER — CLOPIDOGREL BISULFATE 75 MG/1
75 TABLET, FILM COATED ORAL DAILY
Qty: 0 | Refills: 0 | Status: DISCONTINUED | OUTPATIENT
Start: 2019-02-23 | End: 2019-02-27

## 2019-02-22 RX ADMIN — FAMOTIDINE 20 MILLIGRAM(S): 10 INJECTION INTRAVENOUS at 05:50

## 2019-02-22 RX ADMIN — Medication 20 MILLIGRAM(S): at 05:49

## 2019-02-22 RX ADMIN — HEPARIN SODIUM 5000 UNIT(S): 5000 INJECTION INTRAVENOUS; SUBCUTANEOUS at 05:50

## 2019-02-22 RX ADMIN — Medication 650 MILLIGRAM(S): at 15:30

## 2019-02-22 RX ADMIN — SENNA PLUS 2 TABLET(S): 8.6 TABLET ORAL at 22:35

## 2019-02-22 RX ADMIN — Medication 650 MILLIGRAM(S): at 10:10

## 2019-02-22 RX ADMIN — Medication 81 MILLIGRAM(S): at 12:31

## 2019-02-22 RX ADMIN — ATORVASTATIN CALCIUM 80 MILLIGRAM(S): 80 TABLET, FILM COATED ORAL at 22:35

## 2019-02-22 RX ADMIN — CLOPIDOGREL BISULFATE 300 MILLIGRAM(S): 75 TABLET, FILM COATED ORAL at 18:47

## 2019-02-22 RX ADMIN — Medication 650 MILLIGRAM(S): at 15:00

## 2019-02-22 RX ADMIN — HEPARIN SODIUM 5000 UNIT(S): 5000 INJECTION INTRAVENOUS; SUBCUTANEOUS at 18:15

## 2019-02-22 RX ADMIN — Medication 100 MILLIGRAM(S): at 22:35

## 2019-02-22 RX ADMIN — FAMOTIDINE 20 MILLIGRAM(S): 10 INJECTION INTRAVENOUS at 18:15

## 2019-02-22 RX ADMIN — Medication 650 MILLIGRAM(S): at 09:42

## 2019-02-22 RX ADMIN — POLYETHYLENE GLYCOL 3350 17 GRAM(S): 17 POWDER, FOR SOLUTION ORAL at 18:16

## 2019-02-22 NOTE — CONSULT NOTE ADULT - ASSESSMENT
Justyna Francisco  58F smoker htn 8 days ago headache with slowly progressive R sided numbness and 4/5 weakness found to have R 7x8mm opthalmic aneurysm. Case discussed with Dr. Condon and Maksim, does not appeared ruptured on imaging.  - Plan for angio possible embolization on monday  - cont aspirin, load with 300 plavix today, then 75 daily  - check p2y12 on sunday  - please document medical clearance  - NPO after midnight on saturday  - maintain normotension, q4h neurochecks Justyna Francisco  58F smoker htn 8 days ago headache with slowly progressive R sided numbness and 4/5 weakness found to have R 7x8mm opthalmic aneurysm. Case discussed with Dr. Condon and Maksim, does not appeared ruptured on imaging.  - Plan for angio possible embolization on monday  - cont aspirin, load with 300 plavix today, then 75 daily  - check p2y12 on sunday  - please document medical clearance  - NPO after midnight on sunday  - maintain normotension, q4h neurochecks

## 2019-02-22 NOTE — CONSULT NOTE ADULT - SUBJECTIVE AND OBJECTIVE BOX
p ([id number])     HPI:  Patient is a 58 year old female  with a past medical history significant for afib ( off coumadin) cad s/p stent x  1,  htn,  presents with right sided  numbness for the past five days.   Patient reports waking up five days prior to admission with numbness of the right side of her tongue , symptoms continued to progress over the next few days and now include  most of the face as well as the right arm, and leg , she has associated right arm and leg weakness.   She reports it feels like " its asleep" no tingling  or pins and needles . She reports no dysarthria and no dysphagia .   on day of admission, patient was evaluated by her cardiologist for routine visit and was subsequently referred to the hospital for further management. Patient reports no fever or chills , no visual changes,  and no headaches. She has no chest pain, shortness of breath, or palpitations. She reports no neck pain or back pain. She reports no recent illness and no sick contacts. No recent travel. (20 Feb 2019 22:54)    Exam:  AOx3, FC, PERRL, EOMI, no facial   R side 4/5 with numbness, L side 5/5    --Anticoagulation:  aspirin enteric coated 81 milliGRAM(s) Oral daily  clopidogrel Tablet 300 milliGRAM(s) Oral once  heparin  Injectable 5000 Unit(s) SubCutaneous every 12 hours    =====================  PAST MEDICAL HISTORY   Afib  Stented coronary artery  HTN (hypertension)    PAST SURGICAL HISTORY   S/P cholecystectomy  S/P appendectomy  No significant past surgical history        MEDICATIONS:  Antibiotics:    Neuro:  acetaminophen   Tablet .. 650 milliGRAM(s) Oral every 6 hours PRN    Other:  atorvastatin 80 milliGRAM(s) Oral at bedtime  enalapril 20 milliGRAM(s) Oral daily  famotidine    Tablet 20 milliGRAM(s) Oral two times a day  influenza   Vaccine 0.5 milliLiter(s) IntraMuscular once      SOCIAL HISTORY:   Occupation:   Marital Status:     FAMILY HISTORY:  Family history of brain cancer (Sibling)  Family history of acute myocardial infarction (Father)      ROS: Negative except per HPI    LABS:  PT/INR - ( 20 Feb 2019 20:07 )   PT: 11.2 sec;   INR: 0.97 ratio         PTT - ( 20 Feb 2019 20:07 )  PTT:32.1 sec                        11.8   5.33  )-----------( 165      ( 21 Feb 2019 08:21 )             37.1     02-21    142  |  105  |  16  ----------------------------<  107<H>  3.8   |  25  |  0.54    Ca    9.3      21 Feb 2019 05:16    TPro  6.5  /  Alb  4.2  /  TBili  0.3  /  DBili  x   /  AST  14  /  ALT  12  /  AlkPhos  65  02-21

## 2019-02-22 NOTE — PROGRESS NOTE ADULT - SUBJECTIVE AND OBJECTIVE BOX
Patient is a 58y old  Female who presents with a chief complaint of right sided numbness x 5 days (22 Feb 2019 10:26)      SUBJECTIVE / OVERNIGHT EVENTS:  No chest pain. No shortness of breath. No complaints. No events overnight.     MEDICATIONS  (STANDING):  aspirin enteric coated 81 milliGRAM(s) Oral daily  atorvastatin 80 milliGRAM(s) Oral at bedtime  enalapril 20 milliGRAM(s) Oral daily  famotidine    Tablet 20 milliGRAM(s) Oral two times a day  heparin  Injectable 5000 Unit(s) SubCutaneous every 12 hours  influenza   Vaccine 0.5 milliLiter(s) IntraMuscular once    MEDICATIONS  (PRN):  acetaminophen   Tablet .. 650 milliGRAM(s) Oral every 6 hours PRN Mild Pain (1 - 3)      Vital Signs Last 24 Hrs  T(C): 36.5 (22 Feb 2019 05:47), Max: 37 (21 Feb 2019 21:58)  T(F): 97.7 (22 Feb 2019 05:47), Max: 98.6 (21 Feb 2019 21:58)  HR: 56 (22 Feb 2019 05:47) (56 - 68)  BP: 145/67 (22 Feb 2019 05:47) (104/69 - 145/67)  BP(mean): --  RR: 18 (22 Feb 2019 05:47) (18 - 18)  SpO2: 99% (22 Feb 2019 05:47) (96% - 99%)  CAPILLARY BLOOD GLUCOSE        I&O's Summary    22 Feb 2019 07:01  -  22 Feb 2019 12:19  --------------------------------------------------------  IN: 200 mL / OUT: 0 mL / NET: 200 mL        PHYSICAL EXAM:  GENERAL: NAD, well-developed  HEAD:  Atraumatic, Normocephalic  EYES: EOMI, PERRLA, conjunctiva and sclera clear  NECK: Supple, No JVD  CHEST/LUNG: Clear to auscultation bilaterally; No wheeze  HEART: Regular rate and rhythm; No murmurs, rubs, or gallops  ABDOMEN: Soft, Nontender, Nondistended; Bowel sounds present  EXTREMITIES:  2+ Peripheral Pulses, No clubbing, cyanosis, or edema  PSYCH: AAOx3  NEUROLOGY: non-focal  SKIN: No rashes or lesions    LABS:                        11.8   5.33  )-----------( 165      ( 21 Feb 2019 08:21 )             37.1     02-21    142  |  105  |  16  ----------------------------<  107<H>  3.8   |  25  |  0.54    Ca    9.3      21 Feb 2019 05:16    TPro  6.5  /  Alb  4.2  /  TBili  0.3  /  DBili  x   /  AST  14  /  ALT  12  /  AlkPhos  65  02-21    PT/INR - ( 20 Feb 2019 20:07 )   PT: 11.2 sec;   INR: 0.97 ratio         PTT - ( 20 Feb 2019 20:07 )  PTT:32.1 sec          RADIOLOGY & ADDITIONAL TESTS:    Imaging Personally Reviewed:    Consultant(s) Notes Reviewed:      Care Discussed with Consultants/Other Providers:

## 2019-02-22 NOTE — PROGRESS NOTE ADULT - SUBJECTIVE AND OBJECTIVE BOX
Subjective: Patient seen and examined. No new events except as noted.   right sided weakness   Abd pain and cramping   REVIEW OF SYSTEMS:    CONSTITUTIONAL: + weakness, fevers or chills  EYES/ENT: No visual changes;  No vertigo or throat pain   NECK: No pain or stiffness  RESPIRATORY: No cough, wheezing, hemoptysis; No shortness of breath  CARDIOVASCULAR: No chest pain or palpitations  GASTROINTESTINAL: No abdominal or epigastric pain. No nausea, vomiting, or hematemesis; No diarrhea or constipation. No melena or hematochezia.  GENITOURINARY: No dysuria, frequency or hematuria  NEUROLOGICAL: + numbness or weakness  SKIN: No itching, burning, rashes, or lesions   All other review of systems is negative unless indicated above.    MEDICATIONS:  MEDICATIONS  (STANDING):  aspirin enteric coated 81 milliGRAM(s) Oral daily  atorvastatin 80 milliGRAM(s) Oral at bedtime  enalapril 20 milliGRAM(s) Oral daily  famotidine    Tablet 20 milliGRAM(s) Oral two times a day  heparin  Injectable 5000 Unit(s) SubCutaneous every 12 hours  influenza   Vaccine 0.5 milliLiter(s) IntraMuscular once      PHYSICAL EXAM:  T(C): 36.5 (02-22-19 @ 05:47), Max: 37 (02-21-19 @ 21:58)  HR: 56 (02-22-19 @ 05:47) (56 - 68)  BP: 145/67 (02-22-19 @ 05:47) (104/69 - 145/67)  RR: 18 (02-22-19 @ 05:47) (18 - 18)  SpO2: 99% (02-22-19 @ 05:47) (96% - 99%)  Wt(kg): --  I&O's Summary        Appearance: Normal	  HEENT:   Normal oral mucosa, PERRL, EOMI	  Lymphatic: No lymphadenopathy , no edema  Cardiovascular: Normal S1 S2, No JVD, No murmurs , Peripheral pulses palpable 2+ bilaterally  Respiratory: Lungs clear to auscultation, normal effort 	  Gastrointestinal:  Soft, Non-tender, + BS	  Skin: No rashes, No ecchymoses, No cyanosis, warm to touch  Musculoskeletal: Normal range of motion, normal strength  Psychiatry:  Mood & affect appropriate  Ext: No edema  Neuro: right sided weakness     LABS:    CARDIAC MARKERS:                                11.8   5.33  )-----------( 165      ( 21 Feb 2019 08:21 )             37.1     02-21    142  |  105  |  16  ----------------------------<  107<H>  3.8   |  25  |  0.54    Ca    9.3      21 Feb 2019 05:16    TPro  6.5  /  Alb  4.2  /  TBili  0.3  /  DBili  x   /  AST  14  /  ALT  12  /  AlkPhos  65  02-21    proBNP:   Lipid Profile:   HgA1c:   TSH:             TELEMETRY: 	    ECG:  	  RADIOLOGY:   DIAGNOSTIC TESTING:  [ ] Echocardiogram:  [ ]  Catheterization:  [ ] Stress Test:    OTHER:

## 2019-02-22 NOTE — PROGRESS NOTE ADULT - ASSESSMENT
58 F w/ afib ( off coumadin), HLD , cad s/p stent x  1,  htn,  p/w right sided numbness and weakness , CT head w/ cerebral calcifications.       Numbness on right side.    - CTH w/o acute findings, there are calcification which may indicate infection , however poorly correlated with presentation   - MRI brain   - MRA head and neck   - b12 / syphilis screen / tsh normal  - Neurology consult     CAD (coronary artery disease).   -  s/p stent   - c/w ASA.      HTN (hypertension).    - enalapril.     Afib.    -  rate controlled , not on coumadin for reported bleeding ,      Need for prophylactic measure.   -  sub q heparin for dvt ppx.

## 2019-02-22 NOTE — PROGRESS NOTE ADULT - PROBLEM SELECTOR PLAN 1
Awaiting MRI today   Neurology follow up   ID consult to rule out infectious etiology of CT findings  ASA

## 2019-02-23 PROCEDURE — ZZZZZ: CPT

## 2019-02-23 RX ADMIN — Medication 650 MILLIGRAM(S): at 10:34

## 2019-02-23 RX ADMIN — ATORVASTATIN CALCIUM 80 MILLIGRAM(S): 80 TABLET, FILM COATED ORAL at 21:57

## 2019-02-23 RX ADMIN — HEPARIN SODIUM 5000 UNIT(S): 5000 INJECTION INTRAVENOUS; SUBCUTANEOUS at 16:50

## 2019-02-23 RX ADMIN — Medication 325 MILLIGRAM(S): at 12:50

## 2019-02-23 RX ADMIN — Medication 650 MILLIGRAM(S): at 00:28

## 2019-02-23 RX ADMIN — POLYETHYLENE GLYCOL 3350 17 GRAM(S): 17 POWDER, FOR SOLUTION ORAL at 12:50

## 2019-02-23 RX ADMIN — Medication 650 MILLIGRAM(S): at 01:28

## 2019-02-23 RX ADMIN — CLOPIDOGREL BISULFATE 75 MILLIGRAM(S): 75 TABLET, FILM COATED ORAL at 12:50

## 2019-02-23 RX ADMIN — FAMOTIDINE 20 MILLIGRAM(S): 10 INJECTION INTRAVENOUS at 05:12

## 2019-02-23 RX ADMIN — HEPARIN SODIUM 5000 UNIT(S): 5000 INJECTION INTRAVENOUS; SUBCUTANEOUS at 05:12

## 2019-02-23 RX ADMIN — SENNA PLUS 2 TABLET(S): 8.6 TABLET ORAL at 21:56

## 2019-02-23 RX ADMIN — Medication 100 MILLIGRAM(S): at 05:12

## 2019-02-23 RX ADMIN — FAMOTIDINE 20 MILLIGRAM(S): 10 INJECTION INTRAVENOUS at 16:50

## 2019-02-23 RX ADMIN — Medication 650 MILLIGRAM(S): at 11:42

## 2019-02-23 RX ADMIN — Medication 100 MILLIGRAM(S): at 12:51

## 2019-02-23 RX ADMIN — Medication 100 MILLIGRAM(S): at 21:56

## 2019-02-23 RX ADMIN — Medication 20 MILLIGRAM(S): at 05:13

## 2019-02-23 NOTE — PROGRESS NOTE ADULT - ASSESSMENT
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 Case discussed with Dr. Condon and Maksim, does not appeared ruptured on imaging.    Plan:  - Continue management per primary team  - Plan for angio possible embolization on Monday  - cont aspirin, plavix 75 daily  - check p2y12 on Sunday  - Opthalmology evaluation  - please document medical clearance  - NPO after midnight on Sunday and preop labs

## 2019-02-23 NOTE — PROGRESS NOTE ADULT - SUBJECTIVE AND OBJECTIVE BOX
Subjective: Patient seen and examined. No new events except as noted.   feels ok   family at bedside     REVIEW OF SYSTEMS:    CONSTITUTIONAL: No weakness, fevers or chills  EYES/ENT: No visual changes;  No vertigo or throat pain   NECK: No pain or stiffness  RESPIRATORY: No cough, wheezing, hemoptysis; No shortness of breath  CARDIOVASCULAR: No chest pain or palpitations  GASTROINTESTINAL: No abdominal or epigastric pain. No nausea, vomiting, or hematemesis; No diarrhea or constipation. No melena or hematochezia.  GENITOURINARY: No dysuria, frequency or hematuria  NEUROLOGICAL: No numbness or weakness  SKIN: No itching, burning, rashes, or lesions   All other review of systems is negative unless indicated above.    MEDICATIONS:  MEDICATIONS  (STANDING):  aspirin 325 milliGRAM(s) Oral daily  atorvastatin 80 milliGRAM(s) Oral at bedtime  clopidogrel Tablet 75 milliGRAM(s) Oral daily  docusate sodium 100 milliGRAM(s) Oral three times a day  enalapril 20 milliGRAM(s) Oral daily  famotidine    Tablet 20 milliGRAM(s) Oral two times a day  heparin  Injectable 5000 Unit(s) SubCutaneous every 12 hours  influenza   Vaccine 0.5 milliLiter(s) IntraMuscular once  polyethylene glycol 3350 17 Gram(s) Oral daily  senna 2 Tablet(s) Oral at bedtime      PHYSICAL EXAM:  T(C): 36.8 (02-23-19 @ 16:58), Max: 36.9 (02-23-19 @ 00:43)  HR: 62 (02-23-19 @ 16:58) (56 - 62)  BP: 105/71 (02-23-19 @ 16:58) (105/71 - 130/72)  RR: 18 (02-23-19 @ 16:58) (18 - 18)  SpO2: 97% (02-23-19 @ 16:58) (96% - 99%)  Wt(kg): --  I&O's Summary    22 Feb 2019 07:01  -  23 Feb 2019 07:00  --------------------------------------------------------  IN: 1280 mL / OUT: 0 mL / NET: 1280 mL    23 Feb 2019 07:01  -  23 Feb 2019 20:22  --------------------------------------------------------  IN: 540 mL / OUT: 0 mL / NET: 540 mL          Appearance: Normal	  HEENT:   Normal oral mucosa, PERRL, EOMI	  Lymphatic: No lymphadenopathy , no edema  Cardiovascular: Normal S1 S2, No JVD, No murmurs , Peripheral pulses palpable 2+ bilaterally  Respiratory: Lungs clear to auscultation, normal effort 	  Gastrointestinal:  Soft, Non-tender, + BS	  Skin: No rashes, No ecchymoses, No cyanosis, warm to touch  Musculoskeletal: Normal range of motion, normal strength  Psychiatry:  Mood & affect appropriate  Ext: No edema      LABS:    CARDIAC MARKERS:                  proBNP:   Lipid Profile:   HgA1c:   TSH:             TELEMETRY: 	    ECG:  	  RADIOLOGY:   DIAGNOSTIC TESTING:  [ ] Echocardiogram:  [ ]  Catheterization:  [ ] Stress Test:    OTHER:

## 2019-02-23 NOTE — PROGRESS NOTE ADULT - PROBLEM SELECTOR PLAN 1
large aneurysm. For Cerebral angiogram and possible embolization monday   Neurology follow up   ASA  opthalmology consult

## 2019-02-23 NOTE — PROGRESS NOTE ADULT - SUBJECTIVE AND OBJECTIVE BOX
Patient seen and examined at bedside.    T(C): 36.3 (02-23-19 @ 05:07), Max: 36.9 (02-23-19 @ 00:43)  HR: 58 (02-23-19 @ 05:07) (54 - 58)  BP: 113/72 (02-23-19 @ 05:07) (110/72 - 124/77)  RR: 18 (02-23-19 @ 05:07) (18 - 18)  SpO2: 96% (02-23-19 @ 05:07) (96% - 99%)  Wt(kg): --    Exam:    AOx3, FC, PERRL, EOMI, no facial   R side 4/5 with numbness, L side 5/5

## 2019-02-23 NOTE — PROGRESS NOTE ADULT - ASSESSMENT
58 F w/ afib ( off coumadin), HLD , cad s/p stent x  1,  htn,  p/w right sided numbness and weakness , CT head w/ cerebral calcifications.       Numbness and weakness  on right side.    - Plan for angio possible embolization on monday  - cont aspirin, load with 300 plavix today, then 75 daily  - check p2y12 on sunday  - NPO after midnight on sunday  - maintain normotension, q4h neurochecks  - pt has no medical contraindications for the planned procedure    CAD (coronary artery disease).   -  s/p stent   - c/w ASA.      HTN (hypertension).    - enalapril.     Afib.    -  rate controlled , not on coumadin for reported bleeding ,      Need for prophylactic measure.   -  sub q heparin for dvt ppx.

## 2019-02-23 NOTE — PROGRESS NOTE ADULT - SUBJECTIVE AND OBJECTIVE BOX
Patient is a 58y old  Female who presents with a chief complaint of right sided numbness x 5 days (23 Feb 2019 07:15)      SUBJECTIVE / OVERNIGHT EVENTS: c.o . head ache and dizziness    MEDICATIONS  (STANDING):  aspirin 325 milliGRAM(s) Oral daily  atorvastatin 80 milliGRAM(s) Oral at bedtime  clopidogrel Tablet 75 milliGRAM(s) Oral daily  docusate sodium 100 milliGRAM(s) Oral three times a day  enalapril 20 milliGRAM(s) Oral daily  famotidine    Tablet 20 milliGRAM(s) Oral two times a day  heparin  Injectable 5000 Unit(s) SubCutaneous every 12 hours  influenza   Vaccine 0.5 milliLiter(s) IntraMuscular once  polyethylene glycol 3350 17 Gram(s) Oral daily  senna 2 Tablet(s) Oral at bedtime    MEDICATIONS  (PRN):  acetaminophen   Tablet .. 650 milliGRAM(s) Oral every 6 hours PRN Mild Pain (1 - 3)  bisacodyl Suppository 10 milliGRAM(s) Rectal daily PRN Constipation      Vital Signs Last 24 Hrs  T(C): 36.5 (23 Feb 2019 09:36), Max: 36.9 (23 Feb 2019 00:43)  T(F): 97.7 (23 Feb 2019 09:36), Max: 98.5 (23 Feb 2019 00:43)  HR: 58 (23 Feb 2019 09:36) (54 - 58)  BP: 109/73 (23 Feb 2019 09:36) (109/73 - 124/77)  BP(mean): --  RR: 18 (23 Feb 2019 09:36) (18 - 18)  SpO2: 96% (23 Feb 2019 09:36) (96% - 99%)  CAPILLARY BLOOD GLUCOSE        I&O's Summary    22 Feb 2019 07:01  -  23 Feb 2019 07:00  --------------------------------------------------------  IN: 1280 mL / OUT: 0 mL / NET: 1280 mL    23 Feb 2019 07:01  -  23 Feb 2019 11:52  --------------------------------------------------------  IN: 240 mL / OUT: 0 mL / NET: 240 mL        PHYSICAL EXAM:  GENERAL: NAD, well-developed  HEAD:  Atraumatic, Normocephalic  EYES: EOMI, PERRLA, conjunctiva and sclera clear  NECK: Supple, No JVD  CHEST/LUNG: Clear to auscultation bilaterally; No wheeze  HEART: Regular rate and rhythm; No murmurs, rubs, or gallops  ABDOMEN: Soft, Nontender, Nondistended; Bowel sounds present  EXTREMITIES:  2+ Peripheral Pulses, No clubbing, cyanosis, or edema  PSYCH: AAOx3  NEUROLOGY: non-focal  SKIN: No rashes or lesions    LABS:                    RADIOLOGY & ADDITIONAL TESTS:    Imaging Personally Reviewed:    Consultant(s) Notes Reviewed:      Care Discussed with Consultants/Other Providers:

## 2019-02-24 LAB
ALBUMIN SERPL ELPH-MCNC: 4.3 G/DL — SIGNIFICANT CHANGE UP (ref 3.3–5)
ALP SERPL-CCNC: 63 U/L — SIGNIFICANT CHANGE UP (ref 40–120)
ALT FLD-CCNC: 18 U/L — SIGNIFICANT CHANGE UP (ref 10–45)
ANION GAP SERPL CALC-SCNC: 12 MMOL/L — SIGNIFICANT CHANGE UP (ref 5–17)
ANION GAP SERPL CALC-SCNC: 16 MMOL/L — SIGNIFICANT CHANGE UP (ref 5–17)
APTT BLD: 32.1 SEC — SIGNIFICANT CHANGE UP (ref 27.5–36.3)
APTT BLD: 35.4 SEC — SIGNIFICANT CHANGE UP (ref 27.5–36.3)
AST SERPL-CCNC: 22 U/L — SIGNIFICANT CHANGE UP (ref 10–40)
BASOPHILS # BLD AUTO: 0.02 K/UL — SIGNIFICANT CHANGE UP (ref 0–0.2)
BASOPHILS NFR BLD AUTO: 0.4 % — SIGNIFICANT CHANGE UP (ref 0–2)
BILIRUB SERPL-MCNC: 0.3 MG/DL — SIGNIFICANT CHANGE UP (ref 0.2–1.2)
BLD GP AB SCN SERPL QL: NEGATIVE — SIGNIFICANT CHANGE UP
BUN SERPL-MCNC: 14 MG/DL — SIGNIFICANT CHANGE UP (ref 7–23)
BUN SERPL-MCNC: 14 MG/DL — SIGNIFICANT CHANGE UP (ref 7–23)
CALCIUM SERPL-MCNC: 9.6 MG/DL — SIGNIFICANT CHANGE UP (ref 8.4–10.5)
CALCIUM SERPL-MCNC: 9.6 MG/DL — SIGNIFICANT CHANGE UP (ref 8.4–10.5)
CHLORIDE SERPL-SCNC: 100 MMOL/L — SIGNIFICANT CHANGE UP (ref 96–108)
CHLORIDE SERPL-SCNC: 106 MMOL/L — SIGNIFICANT CHANGE UP (ref 96–108)
CO2 SERPL-SCNC: 22 MMOL/L — SIGNIFICANT CHANGE UP (ref 22–31)
CO2 SERPL-SCNC: 23 MMOL/L — SIGNIFICANT CHANGE UP (ref 22–31)
CREAT SERPL-MCNC: 0.52 MG/DL — SIGNIFICANT CHANGE UP (ref 0.5–1.3)
CREAT SERPL-MCNC: 0.54 MG/DL — SIGNIFICANT CHANGE UP (ref 0.5–1.3)
EOSINOPHIL # BLD AUTO: 0.09 K/UL — SIGNIFICANT CHANGE UP (ref 0–0.5)
EOSINOPHIL NFR BLD AUTO: 1.6 % — SIGNIFICANT CHANGE UP (ref 0–6)
GLUCOSE SERPL-MCNC: 95 MG/DL — SIGNIFICANT CHANGE UP (ref 70–99)
GLUCOSE SERPL-MCNC: 98 MG/DL — SIGNIFICANT CHANGE UP (ref 70–99)
HCT VFR BLD CALC: 37.6 % — SIGNIFICANT CHANGE UP (ref 34.5–45)
HCT VFR BLD CALC: 38.2 % — SIGNIFICANT CHANGE UP (ref 34.5–45)
HGB BLD-MCNC: 12 G/DL — SIGNIFICANT CHANGE UP (ref 11.5–15.5)
HGB BLD-MCNC: 13.1 G/DL — SIGNIFICANT CHANGE UP (ref 11.5–15.5)
IMM GRANULOCYTES NFR BLD AUTO: 0.2 % — SIGNIFICANT CHANGE UP (ref 0–1.5)
INR BLD: 0.99 RATIO — SIGNIFICANT CHANGE UP (ref 0.88–1.16)
INR BLD: 1.03 RATIO — SIGNIFICANT CHANGE UP (ref 0.88–1.16)
LYMPHOCYTES # BLD AUTO: 2.7 K/UL — SIGNIFICANT CHANGE UP (ref 1–3.3)
LYMPHOCYTES # BLD AUTO: 48.5 % — HIGH (ref 13–44)
MAGNESIUM SERPL-MCNC: 1.9 MG/DL — SIGNIFICANT CHANGE UP (ref 1.6–2.6)
MCHC RBC-ENTMCNC: 28.8 PG — SIGNIFICANT CHANGE UP (ref 27–34)
MCHC RBC-ENTMCNC: 29.8 PG — SIGNIFICANT CHANGE UP (ref 27–34)
MCHC RBC-ENTMCNC: 31.9 GM/DL — LOW (ref 32–36)
MCHC RBC-ENTMCNC: 34.2 GM/DL — SIGNIFICANT CHANGE UP (ref 32–36)
MCV RBC AUTO: 87.2 FL — SIGNIFICANT CHANGE UP (ref 80–100)
MCV RBC AUTO: 90.2 FL — SIGNIFICANT CHANGE UP (ref 80–100)
MONOCYTES # BLD AUTO: 0.35 K/UL — SIGNIFICANT CHANGE UP (ref 0–0.9)
MONOCYTES NFR BLD AUTO: 6.3 % — SIGNIFICANT CHANGE UP (ref 2–14)
NEUTROPHILS # BLD AUTO: 2.4 K/UL — SIGNIFICANT CHANGE UP (ref 1.8–7.4)
NEUTROPHILS NFR BLD AUTO: 43 % — SIGNIFICANT CHANGE UP (ref 43–77)
PA ADP PRP-ACNC: 28 PRU — LOW (ref 194–417)
PHOSPHATE SERPL-MCNC: 4.4 MG/DL — SIGNIFICANT CHANGE UP (ref 2.5–4.5)
PLATELET # BLD AUTO: 160 K/UL — SIGNIFICANT CHANGE UP (ref 150–400)
PLATELET # BLD AUTO: 170 K/UL — SIGNIFICANT CHANGE UP (ref 150–400)
POTASSIUM SERPL-MCNC: 3.9 MMOL/L — SIGNIFICANT CHANGE UP (ref 3.5–5.3)
POTASSIUM SERPL-MCNC: 4.1 MMOL/L — SIGNIFICANT CHANGE UP (ref 3.5–5.3)
POTASSIUM SERPL-SCNC: 3.9 MMOL/L — SIGNIFICANT CHANGE UP (ref 3.5–5.3)
POTASSIUM SERPL-SCNC: 4.1 MMOL/L — SIGNIFICANT CHANGE UP (ref 3.5–5.3)
PROT SERPL-MCNC: 6.7 G/DL — SIGNIFICANT CHANGE UP (ref 6–8.3)
PROTHROM AB SERPL-ACNC: 11.3 SEC — SIGNIFICANT CHANGE UP (ref 10–13.1)
PROTHROM AB SERPL-ACNC: 11.8 SEC — SIGNIFICANT CHANGE UP (ref 10–12.9)
RBC # BLD: 4.17 M/UL — SIGNIFICANT CHANGE UP (ref 3.8–5.2)
RBC # BLD: 4.38 M/UL — SIGNIFICANT CHANGE UP (ref 3.8–5.2)
RBC # FLD: 12.1 % — SIGNIFICANT CHANGE UP (ref 10.3–14.5)
RBC # FLD: 13 % — SIGNIFICANT CHANGE UP (ref 10.3–14.5)
RH IG SCN BLD-IMP: POSITIVE — SIGNIFICANT CHANGE UP
RH IG SCN BLD-IMP: POSITIVE — SIGNIFICANT CHANGE UP
SODIUM SERPL-SCNC: 138 MMOL/L — SIGNIFICANT CHANGE UP (ref 135–145)
SODIUM SERPL-SCNC: 141 MMOL/L — SIGNIFICANT CHANGE UP (ref 135–145)
WBC # BLD: 4.2 K/UL — SIGNIFICANT CHANGE UP (ref 3.8–10.5)
WBC # BLD: 5.57 K/UL — SIGNIFICANT CHANGE UP (ref 3.8–10.5)
WBC # FLD AUTO: 4.2 K/UL — SIGNIFICANT CHANGE UP (ref 3.8–10.5)
WBC # FLD AUTO: 5.57 K/UL — SIGNIFICANT CHANGE UP (ref 3.8–10.5)

## 2019-02-24 RX ADMIN — Medication 100 MILLIGRAM(S): at 12:44

## 2019-02-24 RX ADMIN — CLOPIDOGREL BISULFATE 75 MILLIGRAM(S): 75 TABLET, FILM COATED ORAL at 12:43

## 2019-02-24 RX ADMIN — Medication 325 MILLIGRAM(S): at 12:43

## 2019-02-24 RX ADMIN — SENNA PLUS 2 TABLET(S): 8.6 TABLET ORAL at 22:19

## 2019-02-24 RX ADMIN — HEPARIN SODIUM 5000 UNIT(S): 5000 INJECTION INTRAVENOUS; SUBCUTANEOUS at 05:51

## 2019-02-24 RX ADMIN — Medication 650 MILLIGRAM(S): at 18:58

## 2019-02-24 RX ADMIN — FAMOTIDINE 20 MILLIGRAM(S): 10 INJECTION INTRAVENOUS at 18:28

## 2019-02-24 RX ADMIN — ATORVASTATIN CALCIUM 80 MILLIGRAM(S): 80 TABLET, FILM COATED ORAL at 22:19

## 2019-02-24 RX ADMIN — POLYETHYLENE GLYCOL 3350 17 GRAM(S): 17 POWDER, FOR SOLUTION ORAL at 12:44

## 2019-02-24 RX ADMIN — Medication 650 MILLIGRAM(S): at 09:54

## 2019-02-24 RX ADMIN — Medication 100 MILLIGRAM(S): at 05:51

## 2019-02-24 RX ADMIN — Medication 650 MILLIGRAM(S): at 09:24

## 2019-02-24 RX ADMIN — Medication 20 MILLIGRAM(S): at 05:51

## 2019-02-24 RX ADMIN — FAMOTIDINE 20 MILLIGRAM(S): 10 INJECTION INTRAVENOUS at 05:51

## 2019-02-24 RX ADMIN — HEPARIN SODIUM 5000 UNIT(S): 5000 INJECTION INTRAVENOUS; SUBCUTANEOUS at 18:28

## 2019-02-24 RX ADMIN — Medication 650 MILLIGRAM(S): at 18:28

## 2019-02-24 RX ADMIN — Medication 100 MILLIGRAM(S): at 22:19

## 2019-02-24 NOTE — PROGRESS NOTE ADULT - ASSESSMENT
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 Case discussed with Dr. Condon and Maksim, does not appeared ruptured on imaging.    Plan:  - Continue management per primary team  - Plan for angio possible embolization on Monday  - cont aspirin, plavix 75 daily  - please document medical clearance  -Please have optho eval (visual exam baseline) today 58F smoker htn 8 days ago headache with slowly progressive R sided numbness and 4/5 weakness found to have R 7x8mm opthalmic aneurysm. Case discussed with Dr. Condon and Maksim, does not appeared ruptured on imaging.    Plan:  - Continue management per primary team  - Plan for angio possible embolization on Monday  - cont aspirin, plavix 75 daily  -Please have optho eval (visual exam baseline) today

## 2019-02-24 NOTE — PROGRESS NOTE ADULT - SUBJECTIVE AND OBJECTIVE BOX
Patient is a 58y old  Female who presents with a chief complaint of right sided numbness x 5 days (24 Feb 2019 09:11)      SUBJECTIVE / OVERNIGHT EVENTS:  no new complaints    MEDICATIONS  (STANDING):  aspirin 325 milliGRAM(s) Oral daily  atorvastatin 80 milliGRAM(s) Oral at bedtime  clopidogrel Tablet 75 milliGRAM(s) Oral daily  docusate sodium 100 milliGRAM(s) Oral three times a day  enalapril 20 milliGRAM(s) Oral daily  famotidine    Tablet 20 milliGRAM(s) Oral two times a day  heparin  Injectable 5000 Unit(s) SubCutaneous every 12 hours  influenza   Vaccine 0.5 milliLiter(s) IntraMuscular once  polyethylene glycol 3350 17 Gram(s) Oral daily  senna 2 Tablet(s) Oral at bedtime    MEDICATIONS  (PRN):  acetaminophen   Tablet .. 650 milliGRAM(s) Oral every 6 hours PRN Mild Pain (1 - 3)  bisacodyl Suppository 10 milliGRAM(s) Rectal daily PRN Constipation      Vital Signs Last 24 Hrs  T(C): 36.7 (24 Feb 2019 09:29), Max: 36.8 (23 Feb 2019 16:58)  T(F): 98.1 (24 Feb 2019 09:29), Max: 98.2 (23 Feb 2019 16:58)  HR: 53 (24 Feb 2019 09:29) (53 - 67)  BP: 109/67 (24 Feb 2019 09:29) (105/71 - 130/72)  BP(mean): --  RR: 18 (24 Feb 2019 09:29) (18 - 18)  SpO2: 98% (24 Feb 2019 09:29) (97% - 100%)  CAPILLARY BLOOD GLUCOSE        I&O's Summary    23 Feb 2019 07:01  -  24 Feb 2019 07:00  --------------------------------------------------------  IN: 780 mL / OUT: 0 mL / NET: 780 mL    24 Feb 2019 07:01  -  24 Feb 2019 11:16  --------------------------------------------------------  IN: 240 mL / OUT: 0 mL / NET: 240 mL        PHYSICAL EXAM:  GENERAL: NAD, well-developed  HEAD:  Atraumatic, Normocephalic  EYES: EOMI, PERRLA, conjunctiva and sclera clear  NECK: Supple, No JVD  CHEST/LUNG: Clear to auscultation bilaterally; No wheeze  HEART: Regular rate and rhythm; No murmurs, rubs, or gallops  ABDOMEN: Soft, Nontender, Nondistended; Bowel sounds present  EXTREMITIES:  2+ Peripheral Pulses, No clubbing, cyanosis, or edema  PSYCH: AAOx3  NEUROLOGY: non-focal  SKIN: No rashes or lesions    LABS:                        12.0   5.57  )-----------( 170      ( 24 Feb 2019 09:08 )             37.6     02-24    141  |  106  |  14  ----------------------------<  95  3.9   |  23  |  0.54    Ca    9.6      24 Feb 2019 05:08  Phos  4.4     02-24  Mg     1.9     02-24    TPro  6.7  /  Alb  4.3  /  TBili  0.3  /  DBili  x   /  AST  22  /  ALT  18  /  AlkPhos  63  02-24    PT/INR - ( 24 Feb 2019 08:27 )   PT: 11.3 sec;   INR: 0.99 ratio         PTT - ( 24 Feb 2019 08:27 )  PTT:32.1 sec          RADIOLOGY & ADDITIONAL TESTS:    Imaging Personally Reviewed:    Consultant(s) Notes Reviewed:      Care Discussed with Consultants/Other Providers:

## 2019-02-24 NOTE — PROGRESS NOTE ADULT - SUBJECTIVE AND OBJECTIVE BOX
Vital Signs Last 24 Hrs  T(C): 36.6 (24 Feb 2019 04:07), Max: 36.8 (23 Feb 2019 16:58)  T(F): 97.8 (24 Feb 2019 04:07), Max: 98.2 (23 Feb 2019 16:58)  HR: 67 (24 Feb 2019 04:07) (57 - 67)  BP: 112/72 (24 Feb 2019 04:07) (105/71 - 130/72)  BP(mean): --  RR: 18 (24 Feb 2019 04:07) (18 - 18)  SpO2: 99% (24 Feb 2019 04:07) (96% - 100%)    Exam:    AOx3, FC, PERRL, EOMI, no facial   R side 4/5 with numbness, L side 5/5

## 2019-02-24 NOTE — PROGRESS NOTE ADULT - SUBJECTIVE AND OBJECTIVE BOX
Subjective: Patient seen and examined. No new events except as noted.   resting comfortably in bed     REVIEW OF SYSTEMS:    CONSTITUTIONAL: No weakness, fevers or chills  EYES/ENT: No visual changes;  No vertigo or throat pain   NECK: No pain or stiffness  RESPIRATORY: No cough, wheezing, hemoptysis; No shortness of breath  CARDIOVASCULAR: No chest pain or palpitations  GASTROINTESTINAL: No abdominal or epigastric pain. No nausea, vomiting, or hematemesis; No diarrhea or constipation. No melena or hematochezia.  GENITOURINARY: No dysuria, frequency or hematuria  NEUROLOGICAL: No numbness or weakness  SKIN: No itching, burning, rashes, or lesions   All other review of systems is negative unless indicated above.    MEDICATIONS:  MEDICATIONS  (STANDING):  aspirin 325 milliGRAM(s) Oral daily  atorvastatin 80 milliGRAM(s) Oral at bedtime  clopidogrel Tablet 75 milliGRAM(s) Oral daily  docusate sodium 100 milliGRAM(s) Oral three times a day  enalapril 20 milliGRAM(s) Oral daily  famotidine    Tablet 20 milliGRAM(s) Oral two times a day  heparin  Injectable 5000 Unit(s) SubCutaneous every 12 hours  influenza   Vaccine 0.5 milliLiter(s) IntraMuscular once  polyethylene glycol 3350 17 Gram(s) Oral daily  senna 2 Tablet(s) Oral at bedtime      PHYSICAL EXAM:  T(C): 36.7 (02-24-19 @ 09:29), Max: 36.8 (02-23-19 @ 16:58)  HR: 53 (02-24-19 @ 09:29) (53 - 67)  BP: 109/67 (02-24-19 @ 09:29) (105/71 - 130/72)  RR: 18 (02-24-19 @ 09:29) (18 - 18)  SpO2: 98% (02-24-19 @ 09:29) (97% - 100%)  Wt(kg): --  I&O's Summary    23 Feb 2019 07:01  -  24 Feb 2019 07:00  --------------------------------------------------------  IN: 780 mL / OUT: 0 mL / NET: 780 mL    24 Feb 2019 07:01  -  24 Feb 2019 11:26  --------------------------------------------------------  IN: 240 mL / OUT: 0 mL / NET: 240 mL          Appearance: Normal	  HEENT:   Normal oral mucosa, PERRL, EOMI	  Lymphatic: No lymphadenopathy , no edema  Cardiovascular: Normal S1 S2, No JVD, No murmurs , Peripheral pulses palpable 2+ bilaterally  Respiratory: Lungs clear to auscultation, normal effort 	  Gastrointestinal:  Soft, Non-tender, + BS	  Skin: No rashes, No ecchymoses, No cyanosis, warm to touch  Musculoskeletal: Normal range of motion, normal strength  Psychiatry:  Mood & affect appropriate  Ext: No edema      LABS:    CARDIAC MARKERS:                                12.0   5.57  )-----------( 170      ( 24 Feb 2019 09:08 )             37.6     02-24    141  |  106  |  14  ----------------------------<  95  3.9   |  23  |  0.54    Ca    9.6      24 Feb 2019 05:08  Phos  4.4     02-24  Mg     1.9     02-24    TPro  6.7  /  Alb  4.3  /  TBili  0.3  /  DBili  x   /  AST  22  /  ALT  18  /  AlkPhos  63  02-24    proBNP:   Lipid Profile:   HgA1c:   TSH:             TELEMETRY: 	    ECG:  	  RADIOLOGY:   DIAGNOSTIC TESTING:  [ ] Echocardiogram:  [ ]  Catheterization:  [ ] Stress Test:    OTHER:

## 2019-02-25 DIAGNOSIS — Z71.89 OTHER SPECIFIED COUNSELING: ICD-10-CM

## 2019-02-25 LAB
ANION GAP SERPL CALC-SCNC: 15 MMOL/L — SIGNIFICANT CHANGE UP (ref 5–17)
BUN SERPL-MCNC: 21 MG/DL — SIGNIFICANT CHANGE UP (ref 7–23)
CALCIUM SERPL-MCNC: 9.8 MG/DL — SIGNIFICANT CHANGE UP (ref 8.4–10.5)
CHLORIDE SERPL-SCNC: 103 MMOL/L — SIGNIFICANT CHANGE UP (ref 96–108)
CO2 SERPL-SCNC: 23 MMOL/L — SIGNIFICANT CHANGE UP (ref 22–31)
CREAT SERPL-MCNC: 0.56 MG/DL — SIGNIFICANT CHANGE UP (ref 0.5–1.3)
GLUCOSE SERPL-MCNC: 92 MG/DL — SIGNIFICANT CHANGE UP (ref 70–99)
HCT VFR BLD CALC: 37.9 % — SIGNIFICANT CHANGE UP (ref 34.5–45)
HGB BLD-MCNC: 12.2 G/DL — SIGNIFICANT CHANGE UP (ref 11.5–15.5)
MCHC RBC-ENTMCNC: 28.8 PG — SIGNIFICANT CHANGE UP (ref 27–34)
MCHC RBC-ENTMCNC: 32.2 GM/DL — SIGNIFICANT CHANGE UP (ref 32–36)
MCV RBC AUTO: 89.6 FL — SIGNIFICANT CHANGE UP (ref 80–100)
PLATELET # BLD AUTO: 167 K/UL — SIGNIFICANT CHANGE UP (ref 150–400)
POTASSIUM SERPL-MCNC: 3.9 MMOL/L — SIGNIFICANT CHANGE UP (ref 3.5–5.3)
POTASSIUM SERPL-SCNC: 3.9 MMOL/L — SIGNIFICANT CHANGE UP (ref 3.5–5.3)
RBC # BLD: 4.23 M/UL — SIGNIFICANT CHANGE UP (ref 3.8–5.2)
RBC # FLD: 13.1 % — SIGNIFICANT CHANGE UP (ref 10.3–14.5)
SODIUM SERPL-SCNC: 141 MMOL/L — SIGNIFICANT CHANGE UP (ref 135–145)
WBC # BLD: 5.01 K/UL — SIGNIFICANT CHANGE UP (ref 3.8–10.5)
WBC # FLD AUTO: 5.01 K/UL — SIGNIFICANT CHANGE UP (ref 3.8–10.5)

## 2019-02-25 PROCEDURE — 99232 SBSQ HOSP IP/OBS MODERATE 35: CPT

## 2019-02-25 PROCEDURE — ZZZZZ: CPT

## 2019-02-25 RX ADMIN — Medication 325 MILLIGRAM(S): at 19:14

## 2019-02-25 RX ADMIN — CLOPIDOGREL BISULFATE 75 MILLIGRAM(S): 75 TABLET, FILM COATED ORAL at 19:14

## 2019-02-25 RX ADMIN — HEPARIN SODIUM 5000 UNIT(S): 5000 INJECTION INTRAVENOUS; SUBCUTANEOUS at 06:32

## 2019-02-25 RX ADMIN — ATORVASTATIN CALCIUM 80 MILLIGRAM(S): 80 TABLET, FILM COATED ORAL at 21:38

## 2019-02-25 RX ADMIN — HEPARIN SODIUM 5000 UNIT(S): 5000 INJECTION INTRAVENOUS; SUBCUTANEOUS at 19:09

## 2019-02-25 RX ADMIN — Medication 20 MILLIGRAM(S): at 06:32

## 2019-02-25 RX ADMIN — Medication 650 MILLIGRAM(S): at 19:12

## 2019-02-25 RX ADMIN — Medication 650 MILLIGRAM(S): at 20:12

## 2019-02-25 RX ADMIN — FAMOTIDINE 20 MILLIGRAM(S): 10 INJECTION INTRAVENOUS at 19:13

## 2019-02-25 NOTE — PROGRESS NOTE ADULT - ASSESSMENT
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 Case discussed with Dr. Condon and Maksim, does not appeared ruptured on imaging.    Plan:  - Continue management per primary team  - Plan for angio possible embolization today  - cont aspirin, plavix 75 daily  -Please have optho eval (visual exam baseline) today

## 2019-02-25 NOTE — PROGRESS NOTE ADULT - ASSESSMENT
58 F w/ afib ( off coumadin), HLD , cad s/p stent x  1,  htn,  p/w right sided numbness and weakness , CT head w/ cerebral calcifications.       Numbness and weakness  on right side.    - Plan for angio possible embolization today  - cont aspirin, plavix  75 daily  - maintain normotension, q4h neurochecks  - pt has no medical contraindications for the planned procedure    CAD (coronary artery disease).   -  s/p stent   - c/w ASA.      HTN (hypertension).    - enalapril.     Afib.    -  rate controlled , not on coumadin for reported bleeding ,      Need for prophylactic measure.   -  sub q heparin for dvt ppx.

## 2019-02-25 NOTE — PROGRESS NOTE ADULT - SUBJECTIVE AND OBJECTIVE BOX
Subjective: Patient seen and examined. No new events except as noted.   +headache   no cp or sob     REVIEW OF SYSTEMS:    CONSTITUTIONAL: No weakness, fevers or chills  EYES/ENT: No visual changes;  No vertigo or throat pain   NECK: No pain or stiffness  RESPIRATORY: No cough, wheezing, hemoptysis; No shortness of breath  CARDIOVASCULAR: No chest pain or palpitations  GASTROINTESTINAL: No abdominal or epigastric pain. No nausea, vomiting, or hematemesis; No diarrhea or constipation. No melena or hematochezia.  GENITOURINARY: No dysuria, frequency or hematuria  NEUROLOGICAL: No numbness or weakness  SKIN: No itching, burning, rashes, or lesions   All other review of systems is negative unless indicated above.    MEDICATIONS:  MEDICATIONS  (STANDING):  aspirin 325 milliGRAM(s) Oral daily  atorvastatin 80 milliGRAM(s) Oral at bedtime  clopidogrel Tablet 75 milliGRAM(s) Oral daily  docusate sodium 100 milliGRAM(s) Oral three times a day  enalapril 20 milliGRAM(s) Oral daily  famotidine    Tablet 20 milliGRAM(s) Oral two times a day  heparin  Injectable 5000 Unit(s) SubCutaneous every 12 hours  influenza   Vaccine 0.5 milliLiter(s) IntraMuscular once  polyethylene glycol 3350 17 Gram(s) Oral daily  senna 2 Tablet(s) Oral at bedtime      PHYSICAL EXAM:  T(C): 36.7 (02-25-19 @ 11:15), Max: 36.9 (02-24-19 @ 20:20)  HR: 63 (02-25-19 @ 11:15) (61 - 90)  BP: 111/71 (02-25-19 @ 11:15) (102/67 - 123/76)  RR: 18 (02-25-19 @ 11:15) (16 - 18)  SpO2: 96% (02-25-19 @ 11:15) (95% - 98%)  Wt(kg): --  I&O's Summary    24 Feb 2019 07:01  -  25 Feb 2019 07:00  --------------------------------------------------------  IN: 480 mL / OUT: 0 mL / NET: 480 mL          Appearance: Normal	  HEENT:   Normal oral mucosa, PERRL, EOMI	  Lymphatic: No lymphadenopathy , no edema  Cardiovascular: Normal S1 S2, No JVD, No murmurs , Peripheral pulses palpable 2+ bilaterally  Respiratory: Lungs clear to auscultation, normal effort 	  Gastrointestinal:  Soft, Non-tender, + BS	  Skin: No rashes, No ecchymoses, No cyanosis, warm to touch  Musculoskeletal: Normal range of motion, normal strength  Psychiatry:  Mood & affect appropriate  Ext: No edema      LABS:    CARDIAC MARKERS:                                12.2   5.01  )-----------( 167      ( 25 Feb 2019 10:37 )             37.9     02-25    141  |  103  |  21  ----------------------------<  92  3.9   |  23  |  0.56    Ca    9.8      25 Feb 2019 07:35  Phos  4.4     02-24  Mg     1.9     02-24    TPro  6.7  /  Alb  4.3  /  TBili  0.3  /  DBili  x   /  AST  22  /  ALT  18  /  AlkPhos  63  02-24    proBNP:   Lipid Profile:   HgA1c:   TSH:             TELEMETRY: 	    ECG:  	  RADIOLOGY:   DIAGNOSTIC TESTING:  [ ] Echocardiogram:  [ ]  Catheterization:  [ ] Stress Test:    OTHER:

## 2019-02-25 NOTE — PROGRESS NOTE ADULT - SUBJECTIVE AND OBJECTIVE BOX
Patient seen and examined at bedside.    T(C): 36.7 (02-25-19 @ 04:07), Max: 36.9 (02-24-19 @ 20:20)  HR: 63 (02-25-19 @ 04:07) (53 - 81)  BP: 119/70 (02-25-19 @ 04:07) (107/71 - 123/76)  RR: 18 (02-25-19 @ 04:07) (16 - 18)  SpO2: 98% (02-25-19 @ 04:07) (95% - 98%)  Wt(kg): --    Exam:    AOx3, FC, PERRL, EOMI, no facial   R side 4/5 with numbness, L side 5/5

## 2019-02-25 NOTE — PROGRESS NOTE ADULT - PROBLEM SELECTOR PLAN 1
large aneurysm. For Cerebral angiogram and possible embolization today  Neurology follow up   ASA  opthalmology consult

## 2019-02-25 NOTE — PROGRESS NOTE ADULT - SUBJECTIVE AND OBJECTIVE BOX
INFECTIOUS DISEASES FOLLOW UP--Stephon Bentley MD  Pager [phone]    No new symptoms.  Cysticercosis Ab pending--but doubt the current CNS findings are related to this.    Further ROS:  CONSTITUTIONAL:  No fever, good appetite  CARDIOVASCULAR:  No chest pain or palpitations  RESPIRATORY:  No dyspnea  GASTROINTESTINAL:  No nausea, vomiting, diarrhea, or abdominal pain  GENITOURINARY:  No dysuria  NEUROLOGIC:  No headache,     Allergies  No Known Allergies    ANTIBIOTICS/RELEVANT:  antimicrobials---not receiving.    immunologic:  influenza   Vaccine 0.5 milliLiter(s) IntraMuscular once    OTHER:  acetaminophen   Tablet .. 650 milliGRAM(s) Oral every 6 hours PRN  aspirin 325 milliGRAM(s) Oral daily  atorvastatin 80 milliGRAM(s) Oral at bedtime  bisacodyl Suppository 10 milliGRAM(s) Rectal daily PRN  clopidogrel Tablet 75 milliGRAM(s) Oral daily  docusate sodium 100 milliGRAM(s) Oral three times a day  enalapril 20 milliGRAM(s) Oral daily  famotidine    Tablet 20 milliGRAM(s) Oral two times a day  heparin  Injectable 5000 Unit(s) SubCutaneous every 12 hours  polyethylene glycol 3350 17 Gram(s) Oral daily  senna 2 Tablet(s) Oral at bedtime    Objective:  Vital Signs Last 24 Hrs  T(C): 36.9 (25 Feb 2019 09:00), Max: 36.9 (24 Feb 2019 20:20)  T(F): 98.5 (25 Feb 2019 09:00), Max: 98.5 (25 Feb 2019 09:00)  HR: 90 (25 Feb 2019 09:00) (61 - 90)  BP: 102/67 (25 Feb 2019 09:00) (102/67 - 123/76)  BP(mean): --  RR: 18 (25 Feb 2019 09:00) (16 - 18)  SpO2: 95% (25 Feb 2019 09:00) (95% - 98%)    PHYSICAL EXAM:  Constitutional:no acute distress  Eyes:CHRISTOPHER, EOMI  Ear/Nose/Throat: no oral lesions, 	  Respiratory: clear BL  Cardiovascular: S1S2  Gastrointestinal:soft, (+) BS, no tenderness  Extremities:no e/e/c  No Lymphadenopathy  IV sites not inflammed.    LABS:                        12.2   5.01  )-----------( 167      ( 25 Feb 2019 10:37 )             37.9     02-25    141  |  103  |  21  ----------------------------<  92  3.9   |  23  |  0.56    Ca    9.8      25 Feb 2019 07:35  Phos  4.4     02-24  Mg     1.9     02-24    TPro  6.7  /  Alb  4.3  /  TBili  0.3  /  DBili  x   /  AST  22  /  ALT  18  /  AlkPhos  63  02-24    PT/INR - ( 24 Feb 2019 11:35 )   PT: 11.8 sec;   INR: 1.03 ratio      PTT - ( 24 Feb 2019 11:35 )  PTT:35.4 sec    Imp/Rx:  Serology pending.  to f/u result.

## 2019-02-25 NOTE — PROGRESS NOTE ADULT - SUBJECTIVE AND OBJECTIVE BOX
Patient is a 58y old  Female who presents with a chief complaint of right sided numbness x 5 days (25 Feb 2019 11:53)      SUBJECTIVE / OVERNIGHT EVENTS:  c/o headache    MEDICATIONS  (STANDING):  aspirin 325 milliGRAM(s) Oral daily  atorvastatin 80 milliGRAM(s) Oral at bedtime  clopidogrel Tablet 75 milliGRAM(s) Oral daily  docusate sodium 100 milliGRAM(s) Oral three times a day  enalapril 20 milliGRAM(s) Oral daily  famotidine    Tablet 20 milliGRAM(s) Oral two times a day  heparin  Injectable 5000 Unit(s) SubCutaneous every 12 hours  influenza   Vaccine 0.5 milliLiter(s) IntraMuscular once  polyethylene glycol 3350 17 Gram(s) Oral daily  senna 2 Tablet(s) Oral at bedtime    MEDICATIONS  (PRN):  acetaminophen   Tablet .. 650 milliGRAM(s) Oral every 6 hours PRN Mild Pain (1 - 3)  bisacodyl Suppository 10 milliGRAM(s) Rectal daily PRN Constipation      Vital Signs Last 24 Hrs  T(C): 36.7 (25 Feb 2019 11:15), Max: 36.9 (24 Feb 2019 20:20)  T(F): 98.1 (25 Feb 2019 11:15), Max: 98.5 (25 Feb 2019 09:00)  HR: 63 (25 Feb 2019 11:15) (61 - 90)  BP: 111/71 (25 Feb 2019 11:15) (102/67 - 123/76)  BP(mean): --  RR: 18 (25 Feb 2019 11:15) (16 - 18)  SpO2: 96% (25 Feb 2019 11:15) (95% - 98%)  CAPILLARY BLOOD GLUCOSE        I&O's Summary    24 Feb 2019 07:01  -  25 Feb 2019 07:00  --------------------------------------------------------  IN: 480 mL / OUT: 0 mL / NET: 480 mL        PHYSICAL EXAM:  GENERAL: NAD, well-developed  HEAD:  Atraumatic, Normocephalic  EYES: EOMI, PERRLA, conjunctiva and sclera clear  NECK: Supple, No JVD  CHEST/LUNG: Clear to auscultation bilaterally; No wheeze  HEART: Regular rate and rhythm; No murmurs, rubs, or gallops  ABDOMEN: Soft, Nontender, Nondistended; Bowel sounds present  EXTREMITIES:  2+ Peripheral Pulses, No clubbing, cyanosis, or edema  PSYCH: AAOx3  NEUROLOGY: non-focal  SKIN: No rashes or lesions    LABS:                        12.2   5.01  )-----------( 167      ( 25 Feb 2019 10:37 )             37.9     02-25    141  |  103  |  21  ----------------------------<  92  3.9   |  23  |  0.56    Ca    9.8      25 Feb 2019 07:35  Phos  4.4     02-24  Mg     1.9     02-24    TPro  6.7  /  Alb  4.3  /  TBili  0.3  /  DBili  x   /  AST  22  /  ALT  18  /  AlkPhos  63  02-24    PT/INR - ( 24 Feb 2019 11:35 )   PT: 11.8 sec;   INR: 1.03 ratio         PTT - ( 24 Feb 2019 11:35 )  PTT:35.4 sec          RADIOLOGY & ADDITIONAL TESTS:    Imaging Personally Reviewed:    Consultant(s) Notes Reviewed:      Care Discussed with Consultants/Other Providers:

## 2019-02-26 ENCOUNTER — APPOINTMENT (OUTPATIENT)
Dept: NEUROSURGERY | Facility: HOSPITAL | Age: 59
End: 2019-02-26
Payer: MEDICAID

## 2019-02-26 LAB
ANION GAP SERPL CALC-SCNC: 15 MMOL/L — SIGNIFICANT CHANGE UP (ref 5–17)
ANION GAP SERPL CALC-SCNC: 16 MMOL/L — SIGNIFICANT CHANGE UP (ref 5–17)
BUN SERPL-MCNC: 18 MG/DL — SIGNIFICANT CHANGE UP (ref 7–23)
BUN SERPL-MCNC: 22 MG/DL — SIGNIFICANT CHANGE UP (ref 7–23)
CALCIUM SERPL-MCNC: 8.2 MG/DL — LOW (ref 8.4–10.5)
CALCIUM SERPL-MCNC: 9.6 MG/DL — SIGNIFICANT CHANGE UP (ref 8.4–10.5)
CHLORIDE SERPL-SCNC: 101 MMOL/L — SIGNIFICANT CHANGE UP (ref 96–108)
CHLORIDE SERPL-SCNC: 106 MMOL/L — SIGNIFICANT CHANGE UP (ref 96–108)
CO2 SERPL-SCNC: 19 MMOL/L — LOW (ref 22–31)
CO2 SERPL-SCNC: 22 MMOL/L — SIGNIFICANT CHANGE UP (ref 22–31)
CREAT SERPL-MCNC: 0.5 MG/DL — SIGNIFICANT CHANGE UP (ref 0.5–1.3)
CREAT SERPL-MCNC: 0.52 MG/DL — SIGNIFICANT CHANGE UP (ref 0.5–1.3)
GLUCOSE SERPL-MCNC: 120 MG/DL — HIGH (ref 70–99)
GLUCOSE SERPL-MCNC: 85 MG/DL — SIGNIFICANT CHANGE UP (ref 70–99)
HCT VFR BLD CALC: 34.5 % — SIGNIFICANT CHANGE UP (ref 34.5–45)
HCT VFR BLD CALC: 38.1 % — SIGNIFICANT CHANGE UP (ref 34.5–45)
HGB BLD-MCNC: 11.9 G/DL — SIGNIFICANT CHANGE UP (ref 11.5–15.5)
HGB BLD-MCNC: 12.7 G/DL — SIGNIFICANT CHANGE UP (ref 11.5–15.5)
MAGNESIUM SERPL-MCNC: 1.5 MG/DL — LOW (ref 1.6–2.6)
MCHC RBC-ENTMCNC: 29.2 PG — SIGNIFICANT CHANGE UP (ref 27–34)
MCHC RBC-ENTMCNC: 30.1 PG — SIGNIFICANT CHANGE UP (ref 27–34)
MCHC RBC-ENTMCNC: 33.4 GM/DL — SIGNIFICANT CHANGE UP (ref 32–36)
MCHC RBC-ENTMCNC: 34.6 GM/DL — SIGNIFICANT CHANGE UP (ref 32–36)
MCV RBC AUTO: 87 FL — SIGNIFICANT CHANGE UP (ref 80–100)
MCV RBC AUTO: 87.3 FL — SIGNIFICANT CHANGE UP (ref 80–100)
PHOSPHATE SERPL-MCNC: 4.9 MG/DL — HIGH (ref 2.5–4.5)
PLATELET # BLD AUTO: 133 K/UL — LOW (ref 150–400)
PLATELET # BLD AUTO: 151 K/UL — SIGNIFICANT CHANGE UP (ref 150–400)
POTASSIUM SERPL-MCNC: 3.3 MMOL/L — LOW (ref 3.5–5.3)
POTASSIUM SERPL-MCNC: 3.8 MMOL/L — SIGNIFICANT CHANGE UP (ref 3.5–5.3)
POTASSIUM SERPL-SCNC: 3.3 MMOL/L — LOW (ref 3.5–5.3)
POTASSIUM SERPL-SCNC: 3.8 MMOL/L — SIGNIFICANT CHANGE UP (ref 3.5–5.3)
RBC # BLD: 3.97 M/UL — SIGNIFICANT CHANGE UP (ref 3.8–5.2)
RBC # BLD: 4.36 M/UL — SIGNIFICANT CHANGE UP (ref 3.8–5.2)
RBC # FLD: 12 % — SIGNIFICANT CHANGE UP (ref 10.3–14.5)
RBC # FLD: 12.1 % — SIGNIFICANT CHANGE UP (ref 10.3–14.5)
SODIUM SERPL-SCNC: 138 MMOL/L — SIGNIFICANT CHANGE UP (ref 135–145)
SODIUM SERPL-SCNC: 141 MMOL/L — SIGNIFICANT CHANGE UP (ref 135–145)
T SOL LAR IGG SER IA-ACNC: SIGNIFICANT CHANGE UP
WBC # BLD: 5.1 K/UL — SIGNIFICANT CHANGE UP (ref 3.8–10.5)
WBC # BLD: 6.5 K/UL — SIGNIFICANT CHANGE UP (ref 3.8–10.5)
WBC # FLD AUTO: 5.1 K/UL — SIGNIFICANT CHANGE UP (ref 3.8–10.5)
WBC # FLD AUTO: 6.5 K/UL — SIGNIFICANT CHANGE UP (ref 3.8–10.5)

## 2019-02-26 PROCEDURE — 36227 PLACE CATH XTRNL CAROTID: CPT | Mod: 50

## 2019-02-26 PROCEDURE — 75898 FOLLOW-UP ANGIOGRAPHY: CPT | Mod: 26

## 2019-02-26 PROCEDURE — 99291 CRITICAL CARE FIRST HOUR: CPT

## 2019-02-26 PROCEDURE — 36224 PLACE CATH CAROTD ART: CPT | Mod: 50

## 2019-02-26 PROCEDURE — 70496 CT ANGIOGRAPHY HEAD: CPT | Mod: 26

## 2019-02-26 PROCEDURE — 75894 X-RAYS TRANSCATH THERAPY: CPT | Mod: 26

## 2019-02-26 PROCEDURE — 99221 1ST HOSP IP/OBS SF/LOW 40: CPT

## 2019-02-26 PROCEDURE — 36228 PLACE CATH INTRACRANIAL ART: CPT | Mod: RT

## 2019-02-26 PROCEDURE — 61624 TCAT PERM OCCLS/EMBOLJ CNS: CPT

## 2019-02-26 PROCEDURE — 36226 PLACE CATH VERTEBRAL ART: CPT | Mod: 50

## 2019-02-26 RX ORDER — ACETAMINOPHEN 500 MG
1000 TABLET ORAL ONCE
Qty: 0 | Refills: 0 | Status: COMPLETED | OUTPATIENT
Start: 2019-02-26 | End: 2019-02-26

## 2019-02-26 RX ORDER — SODIUM CHLORIDE 9 MG/ML
1000 INJECTION INTRAMUSCULAR; INTRAVENOUS; SUBCUTANEOUS
Qty: 0 | Refills: 0 | Status: DISCONTINUED | OUTPATIENT
Start: 2019-02-26 | End: 2019-02-27

## 2019-02-26 RX ORDER — ACETAMINOPHEN 500 MG
1000 TABLET ORAL ONCE
Qty: 0 | Refills: 0 | Status: COMPLETED | OUTPATIENT
Start: 2019-02-26 | End: 2019-02-27

## 2019-02-26 RX ADMIN — SODIUM CHLORIDE 70 MILLILITER(S): 9 INJECTION INTRAMUSCULAR; INTRAVENOUS; SUBCUTANEOUS at 21:00

## 2019-02-26 RX ADMIN — Medication 100 MILLIGRAM(S): at 05:32

## 2019-02-26 RX ADMIN — Medication 400 MILLIGRAM(S): at 21:00

## 2019-02-26 RX ADMIN — FAMOTIDINE 20 MILLIGRAM(S): 10 INJECTION INTRAVENOUS at 05:32

## 2019-02-26 RX ADMIN — Medication 325 MILLIGRAM(S): at 13:13

## 2019-02-26 RX ADMIN — CLOPIDOGREL BISULFATE 75 MILLIGRAM(S): 75 TABLET, FILM COATED ORAL at 13:13

## 2019-02-26 RX ADMIN — Medication 650 MILLIGRAM(S): at 13:13

## 2019-02-26 RX ADMIN — Medication 1000 MILLIGRAM(S): at 21:30

## 2019-02-26 RX ADMIN — Medication 20 MILLIGRAM(S): at 05:32

## 2019-02-26 RX ADMIN — Medication 650 MILLIGRAM(S): at 14:53

## 2019-02-26 NOTE — PROGRESS NOTE ADULT - ASSESSMENT
Angio pipeline stent of incidentally roud R ophthalmic a. aneurysm    Neuro:  Q1 hour neurochecks  MRI/MRA [facility] in am  DAP per neurosurgery  pain control    Cards:  -[phone]    Pulm:  encourage incentive spirometry    GI:  Advance diet as tolerated    Renal:  IVL once adequate PO    Endo:  maintain euglycemia    Heme/Onc:  SCDs, VIVIEN, hold chemoppx fresh post op    ID:  monitor for fevers    full code  PACU under NSCU overnight    at risk for deterioration/death due to post op hemorrhage, stroke  critical care time 30min

## 2019-02-26 NOTE — CONSULT NOTE ADULT - SUBJECTIVE AND OBJECTIVE BOX
[facility] Ophthalmology Consult Note    HPI: Patient is a 58 year old female  with a past medical history significant for afib ( off coumadin) cad s/p stent x  1,  htn,  presents with right sided  numbness for the past five days.   Patient reports waking up five days prior to admission with numbness of the right side of her tongue , symptoms continued to progress over the next few days and now include  most of the face as well as the right arm, and leg , she has associated right arm and leg weakness.   She reports it feels like " its asleep" no tingling  or pins and needles . She reports no dysarthria and no dysphagia .   on day of admission, patient was evaluated by her cardiologist for routine visit and was subsequently referred to the hospital for further management. Patient reports no fever or chills , no visual changes,  and no headaches. She has no chest pain, shortness of breath, or palpitations. She reports no neck pain or back pain. She reports no recent illness and no sick contacts. No recent travel. R 7x8mm opthalmic aneurysm on imaging as below, does not appeared ruptured on imaging. Plan for angio possible embolization with neurosurgery      PMH: As above  Meds:   · 	enalapril 20 mg oral tablet: 1 tab(s) orally once a day, Last Dose Taken:    · 	aspirin 81 mg oral tablet: 1 tab(s) orally once a day, Last Dose Taken:    · 	simvastatin 40 mg oral tablet: 1 tab(s) orally once a day (at bedtime), Last Dose Taken:    · 	raNITIdine 150 mg oral tablet: 1 tab(s) orally 2 times a day, Last Dose Taken:    POcHx (including surgeries/lasers/trauma):  None  Drops: None  FamHx: None  Social Hx: None  Allergies: NKDA    ROS:  General (neg), Vision (per HPI), Head and Neck (neg), Pulm (neg), CV (neg), GI (neg),  (neg), Musculoskeletal (neg), Skin/Integ (neg), Neuro (neg), Endocrine (neg), Heme (neg), All/Immuno (neg)    Mood and Affect Appropriate ( x ),  Oriented to Time, Place, and Person x 3 ( x )    Ophthalmology Exam    Visual acuity (sc): 20/20 OU  Pupils: PERRL OU, no APD  Ttono: 16 OU  Extraocular movements (EOMs): Full OU, no pain, no diplopia   Confrontational Visual Field (CVF):  Full OU  Color Plates: 12/12 OU    Pen Light Exam (PLE)  External:  Flat OU  Lids/Lashes/Lacrimal Ducts: Flat OU    Sclera/Conjunctiva:  W+Q OU  Cornea: Cl OU  Anterior Chamber: D+F OU  Iris:  Flat OU  Lens:  Cl OU    Fundus Exam: dilated with 1% tropicamide and 2.5% phenylephrine  Approval obtained from primary team for dilation  Patient aware that pupils can remained dilated for at least 4-6 hours  Exam performed with 20D lens    Vitreous: wnl OU  Disc, cup/disc: sharp and pink, 0.4 OU  Macula:  wnl OU  Vessels:  wnl OU  Periphery: wnl OU    Diagnostic Testing:    < from: MR Head No Cont (02.22.19 @ 12:42) >  FINDINGS:    Brain MRI AND MRA:    There is is a 7.2 x 8.8 x 8.5 mm, AP, TR, CC saccular aneurysm projecting   superiorly from the right supraclinoid ICA with extension into the right   anterior medial suprasellar cistern abutting and displacing the right   olfactory gyrus superiorly, the saccular aneurysm demonstrates decreased   T1 and T2 and FLAIR signal with flow related and contrast enhancement on   the MRA and post gadolinium sequences.    The distal bilateral carotid arteries demonstrate slight beading and   irregularity, correlate with possible fibrous dysplasia. Internal carotid   arteries are patent in the distal cervical supraclinoid, and bifurcation   segments. The A1, and anterior commuting arteries, and proximal M1,   anterior middle cerebral arteries are unremarkable in course andcaliber.    There is a dominant intradural left vertebral artery and patent   intradural right vertebral artery. The basilar artery, and proximal   posterior arteries are unremarkable in course and caliber.    SWI demonstrates hemosiderin staining along the basal cisterns and   cisternal segments of the cranial nerves suggest close of a prior history   of headache and possible subarachnoid hemorrhage in patient with   underlying aneurysm, SWI series 5 image 22.    Mild prominence of the sulci andventricles are consistent with   age-appropriate volume loss. There are a few tiny foci of T2/FLAIR signal   hyperintensity within the hemispheric white matter, which are nonspecific   and likely sequela of microvascular disease or migraine. There isno   intraparenchymal hematoma, mass effect or midline shift. No areas of   abnormal restrictive diffusion are present to suggest acute or subacute   infarction. No abnormal extra-axial fluid collections are present. There   is a partially empty sella. There is slight prominence of the bilateral   optic nerve sheaths, axial T2 series 8 image 8, these findings can be   associated with pseudotumor cerebri syndrome.    The calvarium is intact. Visualized intraorbital compartments, paranasal   sinuses and tympanomastoid cavities appear free of acute disease., There   is minimal mucosal thickening the paranasal sinuses.      Neck MRA:  The aortic arch and origins of the great vessels are unremarkable. There   is unremarkable course and caliber thebilateral common carotid arteries.   There is no hemodynamically significant stenosis at the ICA origins.   There is slight tortuosity of the bilateral internal carotid arteries   which may reflect hypertension. There is slight irregularity of the   distal ICAs proximal to the petrous portions with beading, this can be   seen with fibrous dysplasia. There is a dominant left vertebral artery   and patent although smaller patent right vertebral artery.    IMPRESSION:    There is a saccular 7.2 x 2.5 x 8.8 mm AP, TR, CC aneurysm from the right   supraclinoid ICA segment projecting superiorly into the right   anterolateral suprasellar cistern with upward displacement of the   proximal right olfactory gyrus.    Hemosiderin staining along the basalcisterns and cisternal segments of   the cranial nerves, SWI series 5 image 23 crit with any history of   headache or  subarachnoid hemorrhage, in patient with saccular aneurysm.    No hemodynamically significant ICA origin stenosis. Slightly tortuous   internal carotid arteries which may be seen with hypertension with   slightly irregular distal ICA segments, which may be seen with fibrous   dysplasia. Patent dominant left vertebral and smaller right vertebral   arteries.    Intracranially, ventricles sulci are unremarkable in size, there is a   partially empty sella with slight nicky of the optic nerve sheaths, this   can be seen with pseudotumor cerebra. There is no acute hemorrhage,   restricted diffusion, or midline shift.     < end of copied text >      Assessment/Plan:        Follow-Up:  Patient should follow up his/her ophthalmologist or in the [facility] within 1 week of discharge.  [address] [place] [address].  [place] [address] [phone]    S/D/W Dr Velasquez (attending) [facility] Ophthalmology Consult Note    HPI: Patient is a 58 year old female  with a past medical history significant for afib ( off coumadin) cad s/p stent x  1,  htn,  presents with right sided  numbness for the past five days.   Patient reports waking up five days prior to admission with numbness of the right side of her tongue , symptoms continued to progress over the next few days and now include  most of the face as well as the right arm, and leg , she has associated right arm and leg weakness.   She reports it feels like " its asleep" no tingling  or pins and needles . She reports no dysarthria and no dysphagia .   on day of admission, patient was evaluated by her cardiologist for routine visit and was subsequently referred to the hospital for further management. Patient reports no fever or chills , no visual changes,  and no headaches. She has no chest pain, shortness of breath, or palpitations. She reports no neck pain or back pain. She reports no recent illness and no sick contacts. No recent travel. R 7x8mm opthalmic aneurysm on imaging as below, does not appeared ruptured on imaging. Plan for angio possible embolization with neurosurgery. No acute complaints. Pt says her vision has been decreasing over last year or so and has gotten new glasses which help      PMH: As above  Meds:   · 	enalapril 20 mg oral tablet: 1 tab(s) orally once a day, Last Dose Taken:    · 	aspirin 81 mg oral tablet: 1 tab(s) orally once a day, Last Dose Taken:    · 	simvastatin 40 mg oral tablet: 1 tab(s) orally once a day (at bedtime), Last Dose Taken:    · 	raNITIdine 150 mg oral tablet: 1 tab(s) orally 2 times a day, Last Dose Taken:    POcHx (including surgeries/lasers/trauma):  None  Drops: None  FamHx: None  Social Hx: None  Allergies: NKDA    ROS:  General (neg), Vision (per HPI), Head and Neck (neg), Pulm (neg), CV (neg), GI (neg),  (neg), Musculoskeletal (neg), Skin/Integ (neg), Neuro (neg), Endocrine (neg), Heme (neg), All/Immuno (neg)    Mood and Affect Appropriate ( x ),  Oriented to Time, Place, and Person x 3 ( x )    Ophthalmology Exam    Visual acuity (sc): 20/40 OU, PH 20/30 OS  Pupils: PERRL OU, no APD  Ttono: 13 OU  Extraocular movements (EOMs): Full OU, no pain, no diplopia   Confrontational Visual Field (CVF):  Full OU  Color Plates: 12/12 OU    Pen Light Exam (PLE)  External:  Flat OU  Lids/Lashes/Lacrimal Ducts: Flat OU    Sclera/Conjunctiva:  W+Q OU  Cornea: Cl OU  Anterior Chamber: D+F OU  Iris:  Flat OU  Lens:  Cl OU    Fundus Exam: dilated with 1% tropicamide and 2.5% phenylephrine  Approval obtained from primary team for dilation  Patient aware that pupils can remained dilated for at least 4-6 hours  Exam performed with 20D lens    Vitreous: wnl OU  Disc, cup/disc: sharp and pink, 0.4 OU  Macula:  wnl OU  Vessels:  wnl OU  Periphery: wnl OU    Diagnostic Testing:    < from: MR Head No Cont (02.22.19 @ 12:42) >  FINDINGS:    Brain MRI AND MRA:    There is is a 7.2 x 8.8 x 8.5 mm, AP, TR, CC saccular aneurysm projecting   superiorly from the right supraclinoid ICA with extension into the right   anterior medial suprasellar cistern abutting and displacing the right   olfactory gyrus superiorly, the saccular aneurysm demonstrates decreased   T1 and T2 and FLAIR signal with flow related and contrast enhancement on   the MRA and post gadolinium sequences.    The distal bilateral carotid arteries demonstrate slight beading and   irregularity, correlate with possible fibrous dysplasia. Internal carotid   arteries are patent in the distal cervical supraclinoid, and bifurcation   segments. The A1, and anterior commuting arteries, and proximal M1,   anterior middle cerebral arteries are unremarkable in course andcaliber.    There is a dominant intradural left vertebral artery and patent   intradural right vertebral artery. The basilar artery, and proximal   posterior arteries are unremarkable in course and caliber.    SWI demonstrates hemosiderin staining along the basal cisterns and   cisternal segments of the cranial nerves suggest close of a prior history   of headache and possible subarachnoid hemorrhage in patient with   underlying aneurysm, SWI series 5 image 22.    Mild prominence of the sulci andventricles are consistent with   age-appropriate volume loss. There are a few tiny foci of T2/FLAIR signal   hyperintensity within the hemispheric white matter, which are nonspecific   and likely sequela of microvascular disease or migraine. There isno   intraparenchymal hematoma, mass effect or midline shift. No areas of   abnormal restrictive diffusion are present to suggest acute or subacute   infarction. No abnormal extra-axial fluid collections are present. There   is a partially empty sella. There is slight prominence of the bilateral   optic nerve sheaths, axial T2 series 8 image 8, these findings can be   associated with pseudotumor cerebri syndrome.    The calvarium is intact. Visualized intraorbital compartments, paranasal   sinuses and tympanomastoid cavities appear free of acute disease., There   is minimal mucosal thickening the paranasal sinuses.      Neck MRA:  The aortic arch and origins of the great vessels are unremarkable. There   is unremarkable course and caliber thebilateral common carotid arteries.   There is no hemodynamically significant stenosis at the ICA origins.   There is slight tortuosity of the bilateral internal carotid arteries   which may reflect hypertension. There is slight irregularity of the   distal ICAs proximal to the petrous portions with beading, this can be   seen with fibrous dysplasia. There is a dominant left vertebral artery   and patent although smaller patent right vertebral artery.    IMPRESSION:    There is a saccular 7.2 x 2.5 x 8.8 mm AP, TR, CC aneurysm from the right   supraclinoid ICA segment projecting superiorly into the right   anterolateral suprasellar cistern with upward displacement of the   proximal right olfactory gyrus.    Hemosiderin staining along the basalcisterns and cisternal segments of   the cranial nerves, SWI series 5 image 23 crit with any history of   headache or  subarachnoid hemorrhage, in patient with saccular aneurysm.    No hemodynamically significant ICA origin stenosis. Slightly tortuous   internal carotid arteries which may be seen with hypertension with   slightly irregular distal ICA segments, which may be seen with fibrous   dysplasia. Patent dominant left vertebral and smaller right vertebral   arteries.    Intracranially, ventricles sulci are unremarkable in size, there is a   partially empty sella with slight nicky of the optic nerve sheaths, this   can be seen with pseudotumor cerebra. There is no acute hemorrhage,   restricted diffusion, or midline shift.     < end of copied text >      Assessment/Plan:        Follow-Up:  Patient should follow up his/her ophthalmologist or in the [facility] Practice within 1 week of discharge.  [address] [place] [id number]  [phone]    S/D/W Dr Velasquez (attending) [facility] Ophthalmology Consult Note    HPI: Patient is a 58 year old female  with a past medical history significant for afib ( off coumadin) cad s/p stent x  1,  htn,  presents with right sided  numbness for the past five days.   Patient reports waking up five days prior to admission with numbness of the right side of her tongue , symptoms continued to progress over the next few days and now include  most of the face as well as the right arm, and leg , she has associated right arm and leg weakness.   She reports it feels like " its asleep" no tingling  or pins and needles . She reports no dysarthria and no dysphagia .   on day of admission, patient was evaluated by her cardiologist for routine visit and was subsequently referred to the hospital for further management. Patient reports no fever or chills , no visual changes,  and no headaches. She has no chest pain, shortness of breath, or palpitations. She reports no neck pain or back pain. She reports no recent illness and no sick contacts. No recent travel. R 7x8mm opthalmic aneurysm on imaging as below, does not appeared ruptured on imaging. Plan for angio possible embolization with neurosurgery. No acute complaints. Pt says her vision has been decreasing over last year or so and has gotten new glasses which help      PMH: As above  Meds:   · 	enalapril 20 mg oral tablet: 1 tab(s) orally once a day, Last Dose Taken:    · 	aspirin 81 mg oral tablet: 1 tab(s) orally once a day, Last Dose Taken:    · 	simvastatin 40 mg oral tablet: 1 tab(s) orally once a day (at bedtime), Last Dose Taken:    · 	raNITIdine 150 mg oral tablet: 1 tab(s) orally 2 times a day, Last Dose Taken:    POcHx (including surgeries/lasers/trauma):  None  Drops: None  FamHx: None  Social Hx: None  Allergies: NKDA    ROS:  General (neg), Vision (per HPI), Head and Neck (neg), Pulm (neg), CV (neg), GI (neg),  (neg), Musculoskeletal (neg), Skin/Integ (neg), Neuro (neg), Endocrine (neg), Heme (neg), All/Immuno (neg)    Mood and Affect Appropriate ( x ),  Oriented to Time, Place, and Person x 3 ( x )    Ophthalmology Exam    Visual acuity (sc): 20/40 OU, PH 20/30 OS  Pupils: PERRL OU, no APD  Ttono: 13 OU  Extraocular movements (EOMs): Full OU, no pain, no diplopia   Confrontational Visual Field (CVF):  Full OU  Color Plates: 12/12 OU    Pen Light Exam (PLE)  External:  Flat OU  Lids/Lashes/Lacrimal Ducts: Flat OU    Sclera/Conjunctiva:  W+Q OU  Cornea: Cl OU  Anterior Chamber: D+F OU  Iris:  Flat OU  Lens:  Cl OU    Fundus Exam: dilated with 1% tropicamide and 2.5% phenylephrine  Approval obtained from primary team for dilation  Patient aware that pupils can remained dilated for at least 4-6 hours  Exam performed with 20D lens    Vitreous: wnl OU  Disc, cup/disc: sharp and pink, 0.4 OU  Macula:  wnl OU  Vessels:  wnl OU  Periphery: wnl OU    Diagnostic Testing:    < from: MR Head No Cont (02.22.19 @ 12:42) >  FINDINGS:    Brain MRI AND MRA:    There is is a 7.2 x 8.8 x 8.5 mm, AP, TR, CC saccular aneurysm projecting   superiorly from the right supraclinoid ICA with extension into the right   anterior medial suprasellar cistern abutting and displacing the right   olfactory gyrus superiorly, the saccular aneurysm demonstrates decreased   T1 and T2 and FLAIR signal with flow related and contrast enhancement on   the MRA and post gadolinium sequences.    The distal bilateral carotid arteries demonstrate slight beading and   irregularity, correlate with possible fibrous dysplasia. Internal carotid   arteries are patent in the distal cervical supraclinoid, and bifurcation   segments. The A1, and anterior commuting arteries, and proximal M1,   anterior middle cerebral arteries are unremarkable in course andcaliber.    There is a dominant intradural left vertebral artery and patent   intradural right vertebral artery. The basilar artery, and proximal   posterior arteries are unremarkable in course and caliber.    SWI demonstrates hemosiderin staining along the basal cisterns and   cisternal segments of the cranial nerves suggest close of a prior history   of headache and possible subarachnoid hemorrhage in patient with   underlying aneurysm, SWI series 5 image 22.    Mild prominence of the sulci andventricles are consistent with   age-appropriate volume loss. There are a few tiny foci of T2/FLAIR signal   hyperintensity within the hemispheric white matter, which are nonspecific   and likely sequela of microvascular disease or migraine. There isno   intraparenchymal hematoma, mass effect or midline shift. No areas of   abnormal restrictive diffusion are present to suggest acute or subacute   infarction. No abnormal extra-axial fluid collections are present. There   is a partially empty sella. There is slight prominence of the bilateral   optic nerve sheaths, axial T2 series 8 image 8, these findings can be   associated with pseudotumor cerebri syndrome.    The calvarium is intact. Visualized intraorbital compartments, paranasal   sinuses and tympanomastoid cavities appear free of acute disease., There   is minimal mucosal thickening the paranasal sinuses.      Neck MRA:  The aortic arch and origins of the great vessels are unremarkable. There   is unremarkable course and caliber thebilateral common carotid arteries.   There is no hemodynamically significant stenosis at the ICA origins.   There is slight tortuosity of the bilateral internal carotid arteries   which may reflect hypertension. There is slight irregularity of the   distal ICAs proximal to the petrous portions with beading, this can be   seen with fibrous dysplasia. There is a dominant left vertebral artery   and patent although smaller patent right vertebral artery.    IMPRESSION:    There is a saccular 7.2 x 2.5 x 8.8 mm AP, TR, CC aneurysm from the right   supraclinoid ICA segment projecting superiorly into the right   anterolateral suprasellar cistern with upward displacement of the   proximal right olfactory gyrus.    Hemosiderin staining along the basalcisterns and cisternal segments of   the cranial nerves, SWI series 5 image 23 crit with any history of   headache or  subarachnoid hemorrhage, in patient with saccular aneurysm.    No hemodynamically significant ICA origin stenosis. Slightly tortuous   internal carotid arteries which may be seen with hypertension with   slightly irregular distal ICA segments, which may be seen with fibrous   dysplasia. Patent dominant left vertebral and smaller right vertebral   arteries.    Intracranially, ventricles sulci are unremarkable in size, there is a   partially empty sella with slight nicky of the optic nerve sheaths, this   can be seen with pseudotumor cerebra. There is no acute hemorrhage,   restricted diffusion, or midline shift.     < end of copied text >      Assessment/Plan:  58 year old female  with a past medical history significant for afib ( off coumadin) cad s/p stent x  1,  htn,  presents with right sided  numbness for the past five days.   Patient reports waking up five days prior to admission with numbness of the right side of her tongue , symptoms continued to progress over the next few days and now include  most of the face as well as the right arm, and leg , she has associated right arm and leg weakness.   She reports it feels like " its asleep" no tingling  or pins and needles . She reports no dysarthria and no dysphagia .  R 7x8mm opthalmic aneurysm on imaging , does not appeared ruptured on imaging. Plan for angio possible embolization with neurosurgery. No acute complaints. Pt says her vision has been decreasing over last year or so and has gotten new glasses which help  - Vision, IOP, pupils all wnl   - Dilated fundus exam wnl OU  - Plan per Neurosurgery/Primary  Follow-Up:  Patient should follow up his/her ophthalmologist or in the [facility] Ophthalmology Practice within 1 week of discharge.  [address] [facility] [place] [address]  [phone]    S/D/W Dr Velasquez (attending)

## 2019-02-26 NOTE — CONSULT NOTE ADULT - REASON FOR ADMISSION
right sided numbness x 5 days

## 2019-02-26 NOTE — PROGRESS NOTE ADULT - SUBJECTIVE AND OBJECTIVE BOX
Subjective: Patient seen and examined. No new events except as noted.   angiogram cancelled yesterday due to other emergencies   feels ok   +headache   daughter at bedside       REVIEW OF SYSTEMS:    CONSTITUTIONAL: + weakness, fevers or chills  EYES/ENT: No visual changes;  No vertigo or throat pain   NECK: No pain or stiffness  RESPIRATORY: No cough, wheezing, hemoptysis; No shortness of breath  CARDIOVASCULAR: No chest pain or palpitations  GASTROINTESTINAL: No abdominal or epigastric pain. No nausea, vomiting, or hematemesis; No diarrhea or constipation. No melena or hematochezia.  GENITOURINARY: No dysuria, frequency or hematuria  NEUROLOGICAL: No numbness or weakness  SKIN: No itching, burning, rashes, or lesions   All other review of systems is negative unless indicated above.    MEDICATIONS:  MEDICATIONS  (STANDING):  aspirin 325 milliGRAM(s) Oral daily  atorvastatin 80 milliGRAM(s) Oral at bedtime  clopidogrel Tablet 75 milliGRAM(s) Oral daily  docusate sodium 100 milliGRAM(s) Oral three times a day  enalapril 20 milliGRAM(s) Oral daily  famotidine    Tablet 20 milliGRAM(s) Oral two times a day  heparin  Injectable 5000 Unit(s) SubCutaneous every 12 hours  influenza   Vaccine 0.5 milliLiter(s) IntraMuscular once  polyethylene glycol 3350 17 Gram(s) Oral daily  senna 2 Tablet(s) Oral at bedtime      PHYSICAL EXAM:  T(C): 36.6 (02-26-19 @ 04:51), Max: 36.6 (02-25-19 @ 16:56)  HR: 52 (02-26-19 @ 04:51) (52 - 56)  BP: 113/74 (02-26-19 @ 04:51) (113/74 - 117/78)  RR: 18 (02-26-19 @ 04:51) (18 - 18)  SpO2: 97% (02-26-19 @ 04:51) (97% - 99%)  Wt(kg): --  I&O's Summary    25 Feb 2019 07:01  -  26 Feb 2019 07:00  --------------------------------------------------------  IN: 120 mL / OUT: 0 mL / NET: 120 mL          Appearance: Normal	  HEENT:   Normal oral mucosa, PERRL, EOMI	  Lymphatic: No lymphadenopathy , no edema  Cardiovascular: Normal S1 S2, No JVD, No murmurs , Peripheral pulses palpable 2+ bilaterally  Respiratory: Lungs clear to auscultation, normal effort 	  Gastrointestinal:  Soft, Non-tender, + BS	  Skin: No rashes, No ecchymoses, No cyanosis, warm to touch  Musculoskeletal: Normal range of motion, normal strength  Psychiatry:  Mood & affect appropriate  Ext: No edema      LABS:    CARDIAC MARKERS:                                12.7   5.1   )-----------( 151      ( 26 Feb 2019 07:08 )             38.1     02-26    138  |  101  |  22  ----------------------------<  85  3.8   |  22  |  0.50    Ca    9.6      26 Feb 2019 07:06      proBNP:   Lipid Profile:   HgA1c:   TSH:             TELEMETRY: 	    ECG:  	  RADIOLOGY:   DIAGNOSTIC TESTING:  [ ] Echocardiogram:  [ ]  Catheterization:  [ ] Stress Test:    OTHER:

## 2019-02-26 NOTE — PROGRESS NOTE ADULT - SUBJECTIVE AND OBJECTIVE BOX
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

## 2019-02-26 NOTE — PROGRESS NOTE ADULT - SUBJECTIVE AND OBJECTIVE BOX
Patient is a 58y old  Female who presents with a chief complaint of right sided numbness x 5 days (26 Feb 2019 11:21)      SUBJECTIVE / OVERNIGHT EVENTS:  No chest pain. No shortness of breath. No complaints. No events overnight.     MEDICATIONS  (STANDING):  aspirin 325 milliGRAM(s) Oral daily  atorvastatin 80 milliGRAM(s) Oral at bedtime  clopidogrel Tablet 75 milliGRAM(s) Oral daily  docusate sodium 100 milliGRAM(s) Oral three times a day  enalapril 20 milliGRAM(s) Oral daily  famotidine    Tablet 20 milliGRAM(s) Oral two times a day  heparin  Injectable 5000 Unit(s) SubCutaneous every 12 hours  influenza   Vaccine 0.5 milliLiter(s) IntraMuscular once  polyethylene glycol 3350 17 Gram(s) Oral daily  senna 2 Tablet(s) Oral at bedtime  sodium chloride 0.9%. 1000 milliLiter(s) (70 mL/Hr) IV Continuous <Continuous>    MEDICATIONS  (PRN):  acetaminophen   Tablet .. 650 milliGRAM(s) Oral every 6 hours PRN Mild Pain (1 - 3)  bisacodyl Suppository 10 milliGRAM(s) Rectal daily PRN Constipation      Vital Signs Last 24 Hrs  T(C): 36.7 (26 Feb 2019 17:51), Max: 36.7 (26 Feb 2019 14:40)  T(F): 98 (26 Feb 2019 14:40), Max: 98 (26 Feb 2019 14:40)  HR: 56 (26 Feb 2019 17:51) (52 - 56)  BP: 111/68 (26 Feb 2019 17:51) (111/68 - 113/74)  BP(mean): --  RR: 18 (26 Feb 2019 17:51) (18 - 18)  SpO2: 94% (26 Feb 2019 17:51) (94% - 97%)  CAPILLARY BLOOD GLUCOSE        I&O's Summary    25 Feb 2019 07:01  -  26 Feb 2019 07:00  --------------------------------------------------------  IN: 120 mL / OUT: 0 mL / NET: 120 mL        PHYSICAL EXAM:  GENERAL: NAD, well-developed  HEAD:  Atraumatic, Normocephalic  EYES: EOMI, PERRLA, conjunctiva and sclera clear  NECK: Supple, No JVD  CHEST/LUNG: Clear to auscultation bilaterally; No wheeze  HEART: Regular rate and rhythm; No murmurs, rubs, or gallops  ABDOMEN: Soft, Nontender, Nondistended; Bowel sounds present  EXTREMITIES:  2+ Peripheral Pulses, No clubbing, cyanosis, or edema  PSYCH: AAOx3  NEUROLOGY: non-focal  SKIN: No rashes or lesions    LABS:                        12.7   5.1   )-----------( 151      ( 26 Feb 2019 07:08 )             38.1     02-26    138  |  101  |  22  ----------------------------<  85  3.8   |  22  |  0.50    Ca    9.6      26 Feb 2019 07:06                RADIOLOGY & ADDITIONAL TESTS:    Imaging Personally Reviewed:    Consultant(s) Notes Reviewed:      Care Discussed with Consultants/Other Providers:

## 2019-02-26 NOTE — CHART NOTE - NSCHARTNOTEFT_GEN_A_CORE
Interventional Neuro- Radiology   Procedure Note PA-CARMELINA    Procedure: Selective Cerebral Angiography   Pre- Procedure Diagnosis:  Post- Procedure Diagnosis:    : Dr Carlitos Oshea  Fellow:     Dr Tg Bhardwaj   Resident: Dr Bernadette Menard   Physician Assistant: Justyna Garcia PA-C    Nurse:  Radiologic Tech:  Anesthesiologist:  Sheath:    I/Os: EBL less than 10cc  IV fluids:     cc     Urine output     cc        Contrast Omnipaque 240              Antibiotics:    Vitals: BP         HR      Spo2      Preliminary Report:  Using a sheath to the right groin under general sedation via   left vertebral artery,  left common carotid artery, left external carotid artery, right vertebral artery,  right common carotid artery, right external carotid artery  a selective cerebral angiography was performed and  demonstrates ________. ( Official note to follow).  Patient tolerated procedure well, hemodynamically stable, no change in neurological status compared to baseline.  Results discussed with neurosurgery, patient and patient's  family.  Right groin sheath was removed, manual compression held to hemostasis  for  21 minutes, no active bleeding, no hematoma, quick clot and safeguard balloon dressing applied at _____h. Interventional Neuro- Radiology   Procedure Note PA-C    Procedure: Selective Cerebral Angiography and endovascular treatment   Pre- Procedure Diagnosis:  right ophthalmic artery aneurysm   Post- Procedure Diagnosis:    : Dr Carlitos Oshea  Fellow:     Dr Tg Bhardwaj   Resident: Dr Bernadette Menard   Physician Assistant: Justyna Garcia PA-C    Nurse:                       Kiana Charles RN  Radiologic Tech:       Stephany Zhu LRT  Anesthesiologist:     Dr Stephon Bloom   Sheath:                   5 Citizen of [place] Fubuki 80 cm   PRU:                       28    I/Os: EBL less than 10cc  IV fluids:     cc  Urine output     cc  Contrast Omnipaque 240       baseline      repeat ACT         Antibiotics: Ancef 2 grams  Nicardipine 3mg right ICA  Heparin 5,000 units     Vitals: /40         HR 68      Spo2 100%     Preliminary Report:  Using a 5 Citizen of [place] Fubuki sheath to the right groin under general sedation via left vertebral artery, left internal carotid artery, left external carotid artery, right vertebral artery, right internal carotid artery, right external carotid artery a selective cerebral angiography was performed and  demonstrated a right ophthalmic artery aneurysm, measuring approximately 8.45mm by 6.66mm. Official note to follow.  Patient tolerated procedure well, hemodynamically stable, no change in neurological status compared to baseline. Results discussed with neuro ICU and patient. Right groin sheath was removed, star close device and manual compression held to hemostasis for 20 minutes, no active bleeding, no hematoma, quick clot and safeguard balloon dressing applied at Interventional Neuro- Radiology   Procedure Note PA-C    Procedure: Selective Cerebral Angiography and endovascular treatment   Pre- Procedure Diagnosis:  right ophthalmic artery aneurysm   Post- Procedure Diagnosis:    : Dr Carlitos Oshea  Fellow:     Dr Tg Bhardwaj   Resident: Dr Bernadette Menard   Physician Assistant: Justyna Garcia PA-C    Nurse:                       Kiana Charles RN  Radiologic Tech:       Stephany Zhu LRT  Anesthesiologist:      Dr Stephon Bloom   Sheath:                    5 [place] Fubuki 80 cm   PRU:                        28    I/Os: EBL less than 10cc  IV fluids:     cc  Urine output     cc  Contrast Omnipaque 240       baseline      repeat          Antibiotics: Ancef 2 grams  Nicardipine 3mg right ICA  Heparin 5,000 units     Vitals: /40         HR 68      Spo2 100%     Preliminary Report:  Using a 5 [place] Fubuki sheath to the right groin under general sedation via left vertebral artery, left internal carotid artery, left external carotid artery, right vertebral artery, right internal carotid artery, right external carotid artery a selective cerebral angiography was performed and  demonstrated a right ophthalmic artery aneurysm, measuring approximately 8.45mm by 6.66mm. Patient underwent pipeline stent deployment X 2. Official note to follow.  Patient tolerated procedure well, hemodynamically stable, no change in neurological status compared to baseline. Results discussed with neuro ICU and patient. Right groin sheath was removed, star close device and manual compression held to hemostasis for 20 minutes, no active bleeding, no hematoma, quick clot and safeguard balloon dressing applied at Interventional Neuro- Radiology   Procedure Note PA-C    Procedure: Selective Cerebral Angiography and endovascular treatment   Pre- Procedure Diagnosis:  right ophthalmic artery aneurysm   Post- Procedure Diagnosis:    : Dr Carlitos Oshea  Fellow:     Dr Tg Bhardwaj   Resident: Dr Bernadette Menard   Physician Assistant: Justyna Garcia PA-C    Nurse:                       Kiana Charles RN  Radiologic Tech:       Stephany Zhu LRT  Anesthesiologist:      Dr Stephon Bloom   Sheath:                     5 [place] Fubuki 80 cm   PRU:                         28    I/Os: EBL less than 10cc  IV fluids:     cc  Urine output     cc  Contrast Omnipaque 240       baseline      repeat          Antibiotics: Ancef 2 grams  Nicardipine 3mg right ICA  Heparin 5,000 units     Vitals: /40         HR 68      Spo2 100%     Preliminary Report:  Using a 5 [place] Fubuki sheath to the right groin under general sedation via left vertebral artery, left internal carotid artery, left external carotid artery, right vertebral artery, right internal carotid artery, right external carotid artery a selective cerebral angiography was performed and  demonstrated a right ophthalmic artery aneurysm, measuring approximately 8.45mm by 6.66mm. Patient underwent pipeline stent deployment X 2. Official note to follow.  Patient tolerated procedure well, hemodynamically stable, no change in neurological status compared to baseline. Results discussed with neuro ICU and patient. Right groin sheath was removed, star close device and manual compression held to hemostasis for 20 minutes, no active bleeding, no hematoma, quick clot and safeguard balloon dressing applied at Interventional Neuro- Radiology   Procedure Note PA-C    Procedure: Selective Cerebral Angiography and endovascular treatment with 2 pipeline stents  Pre- Procedure Diagnosis:  right ophthalmic artery aneurysm   Post- Procedure Diagnosis: right ophthalmic artery aneurysm s/post pipeline stent deployment X 2    : Dr Carlitos Oshea  Fellow:     Dr Tg Bhardwaj   Resident: Dr Bernadette Menard   Physician Assistant: LAMONT ArceC    Nurse:                       Kiana Charles RN  Radiologic Tech:       Stephany Zhu LRT  Anesthesiologist:      Dr Stephon Bloom   Sheath:                     5 [place] Fubuki 80 cm   PRU:                         28    I/Os: EBL less than 10cc  IV fluids:     cc  Urine output   cc  Contrast Omnipaque 240       baseline      repeat          Antibiotics: Ancef 2 grams  Nicardipine 3mg right ICA  Heparin 5,000 units     Vitals: /40         HR 68      Spo2 100%     Preliminary Report:  Using a 5 [place] Fubuki sheath to the right groin under general sedation via left vertebral artery, left internal carotid artery, left external carotid artery, right vertebral artery, right internal carotid artery, right external carotid artery a selective cerebral angiography was performed and  demonstrated a right ophthalmic artery aneurysm, measuring approximately 8.45mm by 6.66mm. Patient underwent pipeline stent deployment X 2. Official note to follow.  Patient tolerated procedure well, hemodynamically stable, no change in neurological status compared to baseline. Results discussed with neuro ICU and patient. Right groin sheath was removed, star close device and manual compression held to hemostasis for 20 minutes, no active bleeding, no hematoma, quick clot and safeguard balloon dressing applied at Interventional Neuro- Radiology   Procedure Note PA-C    Procedure: Selective Cerebral Angiography and endovascular treatment with 2 pipeline stents  Pre- Procedure Diagnosis:  right ophthalmic artery aneurysm   Post- Procedure Diagnosis: right ophthalmic artery aneurysm s/post pipeline stent deployment X 2    : Dr Carlitos Oshea  Fellow:     Dr Tg Bhardwaj   Resident: Dr Bernadette Menard   Physician Assistant: LAMONT ArceC    Nurse:                       Kiana Charles RN  Radiologic Tech:       Stephany Zhu LRT  Anesthesiologist:      Dr Stephon Bloom   Sheath:                     5 Armenian Fubuki 80 cm   PRU:                         28    I/Os: EBL less than 10cc  IV fluids:     cc  Urine output   cc  Contrast Omnipaque 240       baseline      repeat                Antibiotics: Ancef 2 grams  Nicardipine 3mg right ICA  Heparin 5,000 units     Vitals: /40         HR 68      Spo2 100%     Preliminary Report:  Using a 5 Armenian Fubuki sheath to the right groin under general sedation via left vertebral artery, left internal carotid artery, left external carotid artery, right vertebral artery, right internal carotid artery, right external carotid artery a selective cerebral angiography was performed and  demonstrated a right ophthalmic artery aneurysm, measuring approximately 8.45mm by 6.66mm. Patient underwent pipeline stent deployment X 2. Official note to follow.  Patient tolerated procedure well, hemodynamically stable, no change in neurological status compared to baseline. Results discussed with neuro ICU and patient. Right groin sheath was removed, star close device and manual compression held to hemostasis for 20 minutes, no active bleeding, no hematoma, quick clot and safeguard balloon dressing applied at Interventional Neuro- Radiology   Procedure Note PA-C    Procedure: Selective Cerebral Angiography and endovascular treatment with 2 pipeline stents  Pre- Procedure Diagnosis:  right ophthalmic artery aneurysm   Post- Procedure Diagnosis: right ophthalmic artery aneurysm s/post pipeline stent deployment X 2    : Dr Carlitos Oshea  Fellow:     Dr Tg Bhardwaj   Resident: Dr Bernadette Menard   Physician Assistant: Justyna Garcia PA-C    Nurse:                       Kiana Peter RN   Radiologic Tech:       Stephany Zhu LRT  Anesthesiologist:      Dr Stephon Bloom   Sheath:                     5 [place] Fubuki 80 cm   PRU:                         28    I/Os: EBL less than 10cc  IV fluids:500cc  Urine output 350 cc  Contrast 240 121cc      baseline      repeat                Antibiotics: Ancef 2 grams  Nicardipine 3mg right ICA  Heparin 5,000 units     Vitals: /40         HR 68      Spo2 100%     Preliminary Report:  Using a 5 [place] Fubuki sheath to the right groin under general sedation via left vertebral artery, left internal carotid artery, left external carotid artery, right vertebral artery, right internal carotid artery, right external carotid artery a selective cerebral angiography was performed and  demonstrated a right ophthalmic artery aneurysm, measuring approximately 8.45mm by 6.66mm. Patient underwent pipeline stent deployment X 2. Official note to follow.  Patient tolerated procedure well, hemodynamically stable, no change in neurological status compared to baseline. Results discussed with neuro ICU and patient. Right groin sheath was removed, star close device and manual compression held to hemostasis for 20 minutes, no active bleeding, no hematoma, quick clot and safeguard balloon dressing applied at 1945. Interventional Neuro- Radiology   Procedure Note PA-C    Procedure: Selective Cerebral Angiography and endovascular treatment with 2 pipeline stents  Pre- Procedure Diagnosis:  right ophthalmic artery aneurysm   Post- Procedure Diagnosis: right ophthalmic artery aneurysm s/post pipeline stent deployment X 2    : Dr Carlitos Oshea  Fellow:     Dr Tg Bhardwaj   Resident: Dr Bernadette Menard   Physician Assistant: Justyna Garcia PA-C    Nurse:                       Kiana Peter RN   Radiologic Tech:       Stephany Zhu LRT  Anesthesiologist:      Dr Stephon Bloom   Sheath:                     5 [place] Fubuki 80 cm   PRU:                        28    I/Os: EBL less than 10cc  IV fluids:500cc  Urine output 350 cc  Contrast 240 121cc      baseline      repeat                Antibiotics: Ancef 2 grams  Nicardipine 3mg right ICA  Heparin 5,000 units     pipeline stent 4.25mm by 20       2nd pipeline stent 4mm by 12mm     Vitals: /40         HR 68      Spo2 100%     Preliminary Report:  Using a 5 [place] Fubuki sheath to the right groin under general sedation via left vertebral artery, left internal carotid artery, left external carotid artery, right vertebral artery, right internal carotid artery, right external carotid artery a selective cerebral angiography was performed and  demonstrated a right ophthalmic artery aneurysm, measuring approximately 8.45mm by 6.66mm. Patient underwent pipeline stent deployment X 2. Official note to follow.  Patient tolerated procedure well, hemodynamically stable, no change in neurological status compared to baseline. Results discussed with neuro ICU and patient. Right groin sheath was removed, star close device and manual compression held to hemostasis for 20 minutes, no active bleeding, no hematoma, quick clot and safeguard balloon dressing applied at 1945.

## 2019-02-26 NOTE — CONSULT NOTE ADULT - ATTENDING COMMENTS
I have seen and examined this patient with the stroke neurology team.     History was reviewed with the patient and/or available family members.   ROS: All negative except documented in the HPI.   Neurological exam was performed and agree with exam as documented above.   Laboratory results and imaging studies were reviewed by me.   I agree with the neurology resident note as documented above.    58 years old woman with multiple vascular risk factors including age, HTN and CAD s/p cardiac stents is evaluated at [facility] for right-sided sensory paresthesia and weakness of at least 5 days duration. 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 Neurological examination today shows mild right hemiparesis and right hemihypoesthesia.    Impression:  Cerebral thrombosis with cerebral infarction. Probable right MCA distribution stroke involving subcortical structure like corona radiata/internal capsule versus right brainstem stroke - likely etiology being small vessel disease but possibility of large vessel disease and embolism from a proximal source like tightness left occipital source of embolism needs to be ruled out. Late life migraine accompaniment could be a diagnosis of exclusion.    Plan:  - Aspirin and clopidogrel for aggressive secondary stroke prevention for 3 weeks followed by aspirin 81 mg once a day  - Agree to continue with pharmacological DVT prophylaxis   - Atorvastatin 80 mg at bedtime considering likely atheroembolic etiology of her stroke; titrate the dose according to LDL  - HbA1C and LDL, continue with aggressive vascular risk factors modifications   - BP goal - gradual normotension   - Awaiting MRI brain without contrast, MRA head and neck to evaluate for intracranial and extracranial cerebral vasculature   - TTE with bubble study and continue with telemetry to screen for possible cardiac source of embolism  - PT/OT/Speech and swallow/safety eval  - Stroke education    Above mentioned plan was discussed with patient and available family member in detail. All the questions were answered and concerns were addressed.
I have interviewed and examined the patient and reviewed the residents note including the history, exam, assessment, and plan.  I agree with the residents assessment and plan.  See changes below.    58 year old female  with a past medical history significant for afib ( off coumadin) cad s/p stent x  1,  htn,  presents with right sided  numbness for the past five days.   Patient reports waking up five days prior to admission with numbness of the right side of her tongue , symptoms continued to progress over the next few days and now include  most of the face as well as the right arm, and leg , she has associated right arm and leg weakness.   She reports it feels like " its asleep" no tingling  or pins and needles . She reports no dysarthria and no dysphagia .  R 7x8mm opthalmic aneurysm on imaging , does not appeared ruptured on imaging. Plan for angio possible embolization with neurosurgery. No acute complaints. Pt says her vision has been decreasing over last year or so and has gotten new glasses which help  - Vision, IOP, pupils all wnl   - Dilated fundus exam wnl OU  - Plan per Neurosurgery/Primary  - findings and plan discussed with patient and primary team    Follow-Up:  Patient should follow up his/her ophthalmologist or in the [facility] within 1 week of discharge.    Fiorella Velasquez MD

## 2019-02-26 NOTE — CHART NOTE - NSCHARTNOTEFT_GEN_A_CORE
Interventional Neuro Radiology  Pre-Procedure Note PA-C    This is a 58 year old female with a past medical history significant for AFIB ( off coumadin) cad s/p stent x  1,  htn,  presents with right sided  numbness for the past five days.  MRI     Allergies: No Known Allergies  PMHX: Afib, hypertension   PSHX: stented coronary artery, appendectomy, cholecystectomy   Social History:   FAMILY HISTORY:    Current Medications: acetaminophen   Tablet 650 milliGRAM(s) Oral every 6 hours PRN  aspirin 325 milliGRAM(s) Oral daily  atorvastatin 80 milliGRAM(s) Oral at bedtime  bisacodyl Suppository 10 milliGRAM(s) Rectal daily PRN  clopidogrel Tablet 75 milliGRAM(s) Oral daily  docusate sodium 100 milliGRAM(s) Oral three times a day  enalapril 20 milliGRAM(s) Oral daily  famotidine    Tablet 20 milliGRAM(s) Oral two times a day  heparin  Injectable 5000 Unit(s) SubCutaneous every 12 hours  influenza   Vaccine 0.5 milliLiter(s) IntraMuscular once  polyethylene glycol 3350 17 Gram(s) Oral daily  senna 2 Tablet(s) Oral at bedtime      CBC                        12.7   5.1   )-----------( 151      ( 26 Feb 2019 07:08 )             38.1       BMP    138  |  101  |  22  ----------------------------<  85  3.8   |  22  |  0.50      Blood Bank: A positive available         Assessment/Plan:   This is a 58 year old right hand dominant female with   Patient presents to neuro-IR for selective cerebral angiography.   Procedure, goals, risks, benefits and alternatives  were discussed with patient and patient's family.  All questions were answered.  Risks include but are not limited to stroke, vessel injury, hemorrhage, and or right  groin hematoma.  Patient demonstrates understanding  of all risks involved with this procedure and wishes to continue.   Appropriate  content was obtained from patient and consent is in the patient's chart. Interventional Neuro Radiology  Pre-Procedure Note PA-C    This is a 58 year old right hand dominant female with a past medical history significant for AFIB, CAD s/p stent x  1,  htn, presents with right sided  numbness for the past five days. 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 Patient is transported to Neuro IR for a selective cerebral angiography and endovascular treatment of aneurysm.    Allergies: No Known Allergies  PMHX: Afib, hypertension   PSHX: stented coronary artery, appendectomy, cholecystectomy   Social History: non-smoker   FAMILY HISTORY: non-contributory     Current Medications: acetaminophen Tablet 650 mg (s) Oral every 6 hours PRN  aspirin 325 milliGRAM(s) Oral daily  atorvastatin 80 milliGRAM(s) Oral at bedtime  bisacodyl Suppository 10 milliGRAM(s) Rectal daily PRN  clopidogrel Tablet 75 milliGRAM(s) Oral daily  docusate sodium 100 milliGRAM(s) Oral three times a day  enalapril 20 milliGRAM(s) Oral daily  famotidine    Tablet 20 milliGRAM(s) Oral two times a day  heparin  Injectable 5000 Unit(s) SubCutaneous every 12 hours  influenza   Vaccine 0.5 milliLiter(s) IntraMuscular once  polyethylene glycol 3350 17 Gram(s) Oral daily  senna 2 Tablet(s) Oral at bedtime    CBC                        12.7   5.1   )-----------( 151      ( 26 Feb 2019 07:08 )             38.1       BMP    138  |  101  |  22  ----------------------------<  85  3.8   |  22  |  0.50      Blood Bank: A positive available     Assessment/Plan:   This is a 58 year old right hand dominant female with a right ophthalmic artery aneurysm, who is transported to Neuro IR for a selective cerebral angiography and endovascular treatment. Procedure, goals, risks, benefits and alternatives were discussed with patient. All questions were answered. Risks include but are not limited to stroke, vessel injury, hemorrhage, and or right groin hematoma. Patient demonstrates understanding  of all risks involved with this procedure and wishes to continue.  Appropriate content was obtained from patient and consent is in the patient's chart.

## 2019-02-26 NOTE — PROGRESS NOTE ADULT - ASSESSMENT
58 F w/ afib ( off coumadin), HLD , cad s/p stent x  1,  htn,  p/w right sided numbness and weakness , CT head w/ cerebral calcifications.       Numbness and weakness  on right side.    - right ophthalmic artery aneurysm, who is transported to Neuro IR for a selective cerebral angiography and endovascular treatment.  - cont aspirin, plavix  75 daily  - maintain normotension, q4h neurochecks  - pt has no medical contraindications for the planned procedure    CAD (coronary artery disease).   -  s/p stent   - c/w ASA.      HTN (hypertension).    - enalapril.     Afib.    -  rate controlled , not on coumadin for reported bleeding ,      Need for prophylactic measure.   -  sub q heparin for dvt ppx.

## 2019-02-26 NOTE — PROGRESS NOTE ADULT - SUBJECTIVE AND OBJECTIVE BOX
Patient seen and examined at bedside.    T(C): 36.6 (02-26-19 @ 04:51), Max: 36.9 (02-25-19 @ 09:00)  HR: 52 (02-26-19 @ 04:51) (52 - 90)  BP: 113/74 (02-26-19 @ 04:51) (102/67 - 117/78)  RR: 18 (02-26-19 @ 04:51) (18 - 18)  SpO2: 97% (02-26-19 @ 04:51) (95% - 99%)  Wt(kg): --    Exam:    AOx3, FC, PERRL, EOMI, no facial   R side 4/5 with numbness, L side 5/5

## 2019-02-26 NOTE — PROGRESS NOTE ADULT - PROBLEM SELECTOR PLAN 1
large aneurysm. For Cerebral angiogram and possible embolization today  Neurology follow up   ASA  opthalmology consult appreciated

## 2019-02-26 NOTE — CHART NOTE - NSCHARTNOTEFT_GEN_A_CORE
Ophthalmology consult called, [phone], spoke with Dr Cee who will see patient today.      MARGARET Echeverria [phone]

## 2019-02-27 ENCOUNTER — TRANSCRIPTION ENCOUNTER (OUTPATIENT)
Age: 59
End: 2019-02-27

## 2019-02-27 VITALS
TEMPERATURE: 100 F | HEART RATE: 51 BPM | DIASTOLIC BLOOD PRESSURE: 60 MMHG | OXYGEN SATURATION: 96 % | RESPIRATION RATE: 14 BRPM | SYSTOLIC BLOOD PRESSURE: 94 MMHG

## 2019-02-27 PROBLEM — Z00.00 ENCOUNTER FOR PREVENTIVE HEALTH EXAMINATION: Status: ACTIVE | Noted: 2019-02-27

## 2019-02-27 LAB — PA ADP PRP-ACNC: 57 PRU — LOW (ref 194–417)

## 2019-02-27 PROCEDURE — 86780 TREPONEMA PALLIDUM: CPT

## 2019-02-27 PROCEDURE — C1889: CPT

## 2019-02-27 PROCEDURE — 84484 ASSAY OF TROPONIN QUANT: CPT

## 2019-02-27 PROCEDURE — 86901 BLOOD TYPING SEROLOGIC RH(D): CPT

## 2019-02-27 PROCEDURE — 83036 HEMOGLOBIN GLYCOSYLATED A1C: CPT

## 2019-02-27 PROCEDURE — 80048 BASIC METABOLIC PNL TOTAL CA: CPT

## 2019-02-27 PROCEDURE — C1887: CPT

## 2019-02-27 PROCEDURE — 76380 CAT SCAN FOLLOW-UP STUDY: CPT

## 2019-02-27 PROCEDURE — 84100 ASSAY OF PHOSPHORUS: CPT

## 2019-02-27 PROCEDURE — 70544 MR ANGIOGRAPHY HEAD W/O DYE: CPT

## 2019-02-27 PROCEDURE — 86850 RBC ANTIBODY SCREEN: CPT

## 2019-02-27 PROCEDURE — 75894 X-RAYS TRANSCATH THERAPY: CPT

## 2019-02-27 PROCEDURE — 85610 PROTHROMBIN TIME: CPT

## 2019-02-27 PROCEDURE — C1769: CPT

## 2019-02-27 PROCEDURE — 84702 CHORIONIC GONADOTROPIN TEST: CPT

## 2019-02-27 PROCEDURE — 99239 HOSP IP/OBS DSCHRG MGMT >30: CPT

## 2019-02-27 PROCEDURE — 61624 TCAT PERM OCCLS/EMBOLJ CNS: CPT

## 2019-02-27 PROCEDURE — 36228 PLACE CATH INTRACRANIAL ART: CPT

## 2019-02-27 PROCEDURE — 75898 FOLLOW-UP ANGIOGRAPHY: CPT

## 2019-02-27 PROCEDURE — 86900 BLOOD TYPING SEROLOGIC ABO: CPT

## 2019-02-27 PROCEDURE — 85027 COMPLETE CBC AUTOMATED: CPT

## 2019-02-27 PROCEDURE — 80053 COMPREHEN METABOLIC PANEL: CPT

## 2019-02-27 PROCEDURE — 36226 PLACE CATH VERTEBRAL ART: CPT

## 2019-02-27 PROCEDURE — C1760: CPT

## 2019-02-27 PROCEDURE — 84443 ASSAY THYROID STIM HORMONE: CPT

## 2019-02-27 PROCEDURE — 36227 PLACE CATH XTRNL CAROTID: CPT

## 2019-02-27 PROCEDURE — C1894: CPT

## 2019-02-27 PROCEDURE — 99285 EMERGENCY DEPT VISIT HI MDM: CPT

## 2019-02-27 PROCEDURE — 36224 PLACE CATH CAROTD ART: CPT

## 2019-02-27 PROCEDURE — 82607 VITAMIN B-12: CPT

## 2019-02-27 PROCEDURE — 83735 ASSAY OF MAGNESIUM: CPT

## 2019-02-27 PROCEDURE — 70553 MRI BRAIN STEM W/O & W/DYE: CPT | Mod: 26

## 2019-02-27 PROCEDURE — 85576 BLOOD PLATELET AGGREGATION: CPT

## 2019-02-27 PROCEDURE — 70551 MRI BRAIN STEM W/O DYE: CPT

## 2019-02-27 PROCEDURE — 70553 MRI BRAIN STEM W/O & W/DYE: CPT

## 2019-02-27 PROCEDURE — 93005 ELECTROCARDIOGRAM TRACING: CPT

## 2019-02-27 PROCEDURE — A9585: CPT

## 2019-02-27 PROCEDURE — 99231 SBSQ HOSP IP/OBS SF/LOW 25: CPT

## 2019-02-27 PROCEDURE — 86682 HELMINTH ANTIBODY: CPT

## 2019-02-27 PROCEDURE — 70450 CT HEAD/BRAIN W/O DYE: CPT

## 2019-02-27 PROCEDURE — 85730 THROMBOPLASTIN TIME PARTIAL: CPT

## 2019-02-27 PROCEDURE — 70549 MR ANGIOGRAPH NECK W/O&W/DYE: CPT

## 2019-02-27 RX ORDER — CLOPIDOGREL BISULFATE 75 MG/1
1 TABLET, FILM COATED ORAL
Qty: 30 | Refills: 0
Start: 2019-02-27 | End: 2019-03-28

## 2019-02-27 RX ORDER — ASPIRIN/CALCIUM CARB/MAGNESIUM 324 MG
1 TABLET ORAL
Qty: 0 | Refills: 0 | COMMUNITY

## 2019-02-27 RX ORDER — ACETAMINOPHEN 500 MG
2 TABLET ORAL
Qty: 0 | Refills: 0 | DISCHARGE
Start: 2019-02-27

## 2019-02-27 RX ORDER — SENNA PLUS 8.6 MG/1
2 TABLET ORAL
Qty: 0 | Refills: 0 | DISCHARGE
Start: 2019-02-27

## 2019-02-27 RX ORDER — DOCUSATE SODIUM 100 MG
1 CAPSULE ORAL
Qty: 0 | Refills: 0 | DISCHARGE
Start: 2019-02-27

## 2019-02-27 RX ORDER — POLYETHYLENE GLYCOL 3350 17 G/17G
17 POWDER, FOR SOLUTION ORAL
Qty: 0 | Refills: 0 | DISCHARGE
Start: 2019-02-27

## 2019-02-27 RX ORDER — OXYCODONE HYDROCHLORIDE 5 MG/1
5 TABLET ORAL ONCE
Qty: 0 | Refills: 0 | Status: DISCONTINUED | OUTPATIENT
Start: 2019-02-27 | End: 2019-02-27

## 2019-02-27 RX ORDER — POTASSIUM CHLORIDE 20 MEQ
20 PACKET (EA) ORAL
Qty: 0 | Refills: 0 | Status: COMPLETED | OUTPATIENT
Start: 2019-02-27 | End: 2019-02-27

## 2019-02-27 RX ORDER — ASPIRIN/CALCIUM CARB/MAGNESIUM 324 MG
1 TABLET ORAL
Qty: 30 | Refills: 0
Start: 2019-02-27 | End: 2019-03-28

## 2019-02-27 RX ORDER — MAGNESIUM SULFATE 500 MG/ML
1 VIAL (ML) INJECTION ONCE
Qty: 0 | Refills: 0 | Status: COMPLETED | OUTPATIENT
Start: 2019-02-27 | End: 2019-02-27

## 2019-02-27 RX ADMIN — Medication 100 MILLIGRAM(S): at 15:21

## 2019-02-27 RX ADMIN — Medication 100 MILLIGRAM(S): at 06:00

## 2019-02-27 RX ADMIN — CLOPIDOGREL BISULFATE 75 MILLIGRAM(S): 75 TABLET, FILM COATED ORAL at 15:20

## 2019-02-27 RX ADMIN — Medication 20 MILLIEQUIVALENT(S): at 07:50

## 2019-02-27 RX ADMIN — HEPARIN SODIUM 5000 UNIT(S): 5000 INJECTION INTRAVENOUS; SUBCUTANEOUS at 06:01

## 2019-02-27 RX ADMIN — Medication 650 MILLIGRAM(S): at 15:19

## 2019-02-27 RX ADMIN — Medication 20 MILLIEQUIVALENT(S): at 04:21

## 2019-02-27 RX ADMIN — Medication 20 MILLIGRAM(S): at 15:22

## 2019-02-27 RX ADMIN — Medication 400 MILLIGRAM(S): at 08:00

## 2019-02-27 RX ADMIN — Medication 100 GRAM(S): at 04:29

## 2019-02-27 RX ADMIN — POLYETHYLENE GLYCOL 3350 17 GRAM(S): 17 POWDER, FOR SOLUTION ORAL at 15:23

## 2019-02-27 RX ADMIN — OXYCODONE HYDROCHLORIDE 5 MILLIGRAM(S): 5 TABLET ORAL at 02:12

## 2019-02-27 RX ADMIN — Medication 325 MILLIGRAM(S): at 15:20

## 2019-02-27 RX ADMIN — Medication 20 MILLIEQUIVALENT(S): at 06:00

## 2019-02-27 RX ADMIN — OXYCODONE HYDROCHLORIDE 5 MILLIGRAM(S): 5 TABLET ORAL at 02:42

## 2019-02-27 RX ADMIN — Medication 1000 MILLIGRAM(S): at 08:30

## 2019-02-27 RX ADMIN — FAMOTIDINE 20 MILLIGRAM(S): 10 INJECTION INTRAVENOUS at 06:00

## 2019-02-27 NOTE — DISCHARGE NOTE ADULT - CARE PROVIDER_API CALL
Carlitos Oshea)  Neurological Surgery  [address] [place] [address]  Phone: [phone]  Fax: [phone]  Follow Up Time: 1 week    Deon Philip)  Cardiology; Internal Medicine  [address], [address]  [place] [address]  Phone: [phone]  Fax: [phone]  Follow Up Time: 2 weeks

## 2019-02-27 NOTE — PROGRESS NOTE ADULT - ASSESSMENT
Angio pipeline stent of incidentally found R ophthalmic a. aneurysm.    Neuro:  Q2 hour neurochecks  MRI/MRA NOVA in am  DAP per neurosurgery  pain control    Cards:  -160    Pulm:  encourage incentive spirometry    GI:  Advance diet as tolerated    Renal:  IVL once adequate PO    Endo:  maintain euglycemia    Heme/Onc:  SCDs, VIVIEN, early ambulation    ID:  monitor for fevers    full code  Possible d/c home after MRI    at risk for deterioration/death due to post op hemorrhage, stroke  critical care time 30min Angio pipeline stent of incidentally found R ophthalmic a. aneurysm.    Neuro:  Q2 hour neurochecks  MRI/MRA NOVA in am  DAP per neurosurgery  pain control    Cards:  -160    Pulm:  encourage incentive spirometry    GI:  Advance diet as tolerated    Renal:  IVL once adequate PO    Endo:  maintain euglycemia    Heme/Onc:  SCDs, VIVIEN, early ambulation    ID:  monitor for fevers    full code  Possible d/c home after MRI

## 2019-02-27 NOTE — DISCHARGE NOTE ADULT - REASON FOR ADMISSION
Admitted 2/20 right sided weakness and numbness; 2/25 cerebral angiogram for placement of 2 pipeline stents for treatment of right ophthalmic artery aneurysm

## 2019-02-27 NOTE — DISCHARGE NOTE ADULT - PROVIDER TOKENS
PROVIDER:[TOKEN:[[id number]],FOLLOWUP:[1 week]],PROVIDER:[TOKEN:[[id number]],FOLLOWUP:[2 weeks]]

## 2019-02-27 NOTE — PROGRESS NOTE ADULT - PROBLEM SELECTOR PLAN 1
large aneurysm.s/p Stent   Awaiting MRI   Neurology follow up   ASA  opthalmology consult appreciated

## 2019-02-27 NOTE — DISCHARGE NOTE ADULT - PLAN OF CARE
s/p placement of 2 pipeline stents to right ophthalmic artery aneurysm .  .  Please follow up with neurosurgeon Dr. Carlitos Oshea on 3/6/19 at 1:30pm. An appointment has been made for you. Call [phone] to change or reschedule this appointment. Continue Aspirin 325mg daily and Plavix 75 mg daily for patency of stent.   .  .  NO heavy lifting, strenuous activity, twisting, bending, driving, or working until cleared by your physician.   Return to ER immediately for any of the following: fever, bleeding, new onset numbness/tingling/weakness, nausea and/or vomiting, chest pain, shortness of breath, confusion, seizure, altered mental status, urinary and/or fecal incontinence or retention. .  Please make an appointment for follow up with your primary care physician after discharge. .  Please make an appointment for follow up with your cardiologist after discharge.

## 2019-02-27 NOTE — DISCHARGE NOTE ADULT - ADDITIONAL INSTRUCTIONS
Please follow up with your neurosurgeon Dr. Carlitos Oshea on 3/6/19 at 1:30 pm.    Please make an appointment for follow up with your cardiologist after discharge.   Please make an appointment for follow up with your primary care physician after discharge.

## 2019-02-27 NOTE — PROGRESS NOTE ADULT - SUBJECTIVE AND OBJECTIVE BOX
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  Now s/p angio/pipeline stent    No o/n event.    Vitals/labs/meds/imaging reviewed.      alert, fully oriented, PERRL, EOMI, FS, TML  RUE drift, 4/5, L side full strength  RLE DF/PF 4/5, L full strength  irregularly irregular  clear lungs  soft abd/nd  wwp exp  R groin c/d/i, no hematoma

## 2019-02-27 NOTE — DISCHARGE NOTE ADULT - PATIENT PORTAL LINK FT
You can access the [facility], offered by [facility], by registering with the following website: [email or web address]

## 2019-02-27 NOTE — DISCHARGE NOTE ADULT - NS AS ACTIVITY OBS
Showering allowed/Do not make important decisions/Do not drive or operate machinery/No Heavy lifting/straining/Walking-Indoors allowed/Stairs allowed/Walking-Outdoors allowed

## 2019-02-27 NOTE — PROGRESS NOTE ADULT - SUBJECTIVE AND OBJECTIVE BOX
Subjective: Patient seen and examined. No new events except as noted.   s/p stent   feels ok   Now in PACU   headache improved       REVIEW OF SYSTEMS:    CONSTITUTIONAL: + weakness, fevers or chills  EYES/ENT: No visual changes;  No vertigo or throat pain   NECK: No pain or stiffness  RESPIRATORY: No cough, wheezing, hemoptysis; No shortness of breath  CARDIOVASCULAR: No chest pain or palpitations  GASTROINTESTINAL: No abdominal or epigastric pain. No nausea, vomiting, or hematemesis; No diarrhea or constipation. No melena or hematochezia.  GENITOURINARY: No dysuria, frequency or hematuria  NEUROLOGICAL: No numbness or weakness  SKIN: No itching, burning, rashes, or lesions   All other review of systems is negative unless indicated above.    MEDICATIONS:  MEDICATIONS  (STANDING):  aspirin 325 milliGRAM(s) Oral daily  atorvastatin 80 milliGRAM(s) Oral at bedtime  clopidogrel Tablet 75 milliGRAM(s) Oral daily  docusate sodium 100 milliGRAM(s) Oral three times a day  enalapril 20 milliGRAM(s) Oral daily  famotidine    Tablet 20 milliGRAM(s) Oral two times a day  heparin  Injectable 5000 Unit(s) SubCutaneous every 12 hours  influenza   Vaccine 0.5 milliLiter(s) IntraMuscular once  polyethylene glycol 3350 17 Gram(s) Oral daily  senna 2 Tablet(s) Oral at bedtime  sodium chloride 0.9%. 1000 milliLiter(s) (70 mL/Hr) IV Continuous <Continuous>      PHYSICAL EXAM:  T(C): 36.6 (02-27-19 @ 11:00), Max: 36.8 (02-27-19 @ 06:00)  HR: 52 (02-27-19 @ 11:00) (52 - 82)  BP: 100/57 (02-27-19 @ 11:00) (92/53 - 126/51)  RR: 18 (02-27-19 @ 11:00) (14 - 18)  SpO2: 97% (02-27-19 @ 11:00) (94% - 100%)  Wt(kg): --  I&O's Summary    26 Feb 2019 07:01  -  27 Feb 2019 07:00  --------------------------------------------------------  IN: 917 mL / OUT: 815 mL / NET: 102 mL    27 Feb 2019 07:01  -  27 Feb 2019 11:29  --------------------------------------------------------  IN: 440 mL / OUT: 130 mL / NET: 310 mL      Height (cm): 162.56 (02-26 @ 17:51)  Weight (kg): 76.2 (02-26 @ 17:51)  BMI (kg/m2): 28.8 (02-26 @ 17:51)  BSA (m2): 1.82 (02-26 @ 17:51)    Appearance: Normal	  HEENT:   Normal oral mucosa, PERRL, EOMI	  Lymphatic: No lymphadenopathy , no edema  Cardiovascular: Normal S1 S2, No JVD, No murmurs , Peripheral pulses palpable 2+ bilaterally  Respiratory: Lungs clear to auscultation, normal effort 	  Gastrointestinal:  Soft, Non-tender, + BS	  Skin: No rashes, No ecchymoses, No cyanosis, warm to touch  Musculoskeletal: Normal range of motion, normal strength  Psychiatry:  Mood & affect appropriate  Ext: No edema      LABS:    CARDIAC MARKERS:                                11.9   6.5   )-----------( 133      ( 26 Feb 2019 21:33 )             34.5     02-26    141  |  106  |  18  ----------------------------<  120<H>  3.3<L>   |  19<L>  |  0.52    Ca    8.2<L>      26 Feb 2019 21:33  Phos  4.9     02-26  Mg     1.5     02-26      proBNP:   Lipid Profile:   HgA1c:   TSH:             TELEMETRY: 	SR    ECG:  	  RADIOLOGY:   DIAGNOSTIC TESTING:  [ ] Echocardiogram:  [ ]  Catheterization:  [ ] Stress Test:    OTHER:

## 2019-02-27 NOTE — PROGRESS NOTE ADULT - PROBLEM SELECTOR PLAN 2
Stable   ASA Stable   ASA  PATIENT DOES NOT HAVE A HISTORY OF AFIB OR AFLUTTER. THE INITIAL EKG DONE IN THE ER IS NOT AFLUTTER. IT IS A SINUS RHYTHM WITH ARTIFACT. NO NEED FOR SYSTEMIC ANTICOAGULATION.   CONT WITH DUAL ANTIPLATELET THERAPY

## 2019-02-27 NOTE — PROGRESS NOTE ADULT - REASON FOR ADMISSION
right sided numbness x 5 days

## 2019-02-27 NOTE — PROGRESS NOTE ADULT - SUBJECTIVE AND OBJECTIVE BOX
Patient is a 58y old  Female who presents with a chief complaint of Admitted 2/20 right sided weakness and numbness; 2/25 cerebral angiogram for placement of 2 pipeline stents for treatment of right ophthalmic artery aneurysm (27 Feb 2019 15:57)      SUBJECTIVE / OVERNIGHT EVENTS:  seen in pacu    MEDICATIONS  (STANDING):  aspirin 325 milliGRAM(s) Oral daily  atorvastatin 80 milliGRAM(s) Oral at bedtime  clopidogrel Tablet 75 milliGRAM(s) Oral daily  docusate sodium 100 milliGRAM(s) Oral three times a day  enalapril 20 milliGRAM(s) Oral daily  famotidine    Tablet 20 milliGRAM(s) Oral two times a day  heparin  Injectable 5000 Unit(s) SubCutaneous every 12 hours  influenza   Vaccine 0.5 milliLiter(s) IntraMuscular once  polyethylene glycol 3350 17 Gram(s) Oral daily  senna 2 Tablet(s) Oral at bedtime  sodium chloride 0.9%. 1000 milliLiter(s) (70 mL/Hr) IV Continuous <Continuous>    MEDICATIONS  (PRN):  acetaminophen   Tablet .. 650 milliGRAM(s) Oral every 6 hours PRN Mild Pain (1 - 3)  bisacodyl Suppository 10 milliGRAM(s) Rectal daily PRN Constipation      Vital Signs Last 24 Hrs  T(C): 36.5 (27 Feb 2019 15:00), Max: 36.8 (27 Feb 2019 06:00)  T(F): 97.7 (27 Feb 2019 15:00), Max: 98.2 (27 Feb 2019 06:00)  HR: 57 (27 Feb 2019 16:00) (52 - 82)  BP: 107/58 (27 Feb 2019 16:00) (91/55 - 126/51)  BP(mean): 77 (27 Feb 2019 16:00) (69 - 80)  RR: 16 (27 Feb 2019 15:00) (14 - 18)  SpO2: 100% (27 Feb 2019 16:00) (94% - 100%)  CAPILLARY BLOOD GLUCOSE        I&O's Summary    26 Feb 2019 07:01  -  27 Feb 2019 07:00  --------------------------------------------------------  IN: 917 mL / OUT: 815 mL / NET: 102 mL    27 Feb 2019 07:01  -  27 Feb 2019 17:05  --------------------------------------------------------  IN: 650 mL / OUT: 130 mL / NET: 520 mL        PHYSICAL EXAM:  GENERAL: NAD, well-developed  HEAD:  Atraumatic, Normocephalic  EYES: EOMI, PERRLA, conjunctiva and sclera clear  NECK: Supple, No JVD  CHEST/LUNG: Clear to auscultation bilaterally; No wheeze  HEART: Regular rate and rhythm; No murmurs, rubs, or gallops  ABDOMEN: Soft, Nontender, Nondistended; Bowel sounds present  EXTREMITIES:  2+ Peripheral Pulses, No clubbing, cyanosis, or edema  PSYCH: AAOx3  NEUROLOGY: non-focal  SKIN: No rashes or lesions    LABS:                        11.9   6.5   )-----------( 133      ( 26 Feb 2019 21:33 )             34.5     02-26    141  |  106  |  18  ----------------------------<  120<H>  3.3<L>   |  19<L>  |  0.52    Ca    8.2<L>      26 Feb 2019 21:33  Phos  4.9     02-26  Mg     1.5     02-26                RADIOLOGY & ADDITIONAL TESTS:    Imaging Personally Reviewed:    Consultant(s) Notes Reviewed:      Care Discussed with Consultants/Other Providers:

## 2019-02-27 NOTE — PROGRESS NOTE ADULT - ASSESSMENT
59 yo female admitted with facial and right sided numbness and weakness and found to have an abnormal CT head

## 2019-02-27 NOTE — PROGRESS NOTE ADULT - ASSESSMENT
58 F w/ afib ( off coumadin), HLD , cad s/p stent x  1,  htn,  p/w right sided numbness and weakness , CT head w/ cerebral calcifications.       Numbness and weakness  on right side.    - right ophthalmic artery aneurysm, s/p  a selective cerebral angiography and endovascular treatment.  - cont aspirin, plavix  75 daily  - maintain normotension, q4h neurochecks  - pt has no medical contraindications for the planned procedure    CAD (coronary artery disease).   -  s/p stent   - c/w ASA.      HTN (hypertension).    - enalapril.     Afib.    -  rate controlled , not on coumadin for reported bleeding ,      Need for prophylactic measure.   -  sub q heparin for dvt ppx.

## 2019-02-27 NOTE — PROGRESS NOTE ADULT - PROVIDER SPECIALTY LIST ADULT
Cardiology
Infectious Disease
Internal Medicine
NSICU
NSICU
Neurosurgery
Internal Medicine

## 2019-02-27 NOTE — DISCHARGE NOTE ADULT - MEDICATION SUMMARY - MEDICATIONS TO TAKE
I will START or STAY ON the medications listed below when I get home from the hospital:    acetaminophen 325 mg oral tablet  -- 2 tab(s) by mouth every 6 hours, As needed, Mild Pain (1 - 3)  -- Indication: For Mild pain    aspirin 325 mg oral tablet  -- 1 tab(s) by mouth once a day  -- Indication: For Stent patency    enalapril 20 mg oral tablet  -- 1 tab(s) by mouth once a day  -- Indication: For HTN (hypertension)    simvastatin 40 mg oral tablet  -- 1 tab(s) by mouth once a day (at bedtime)  -- Indication: For Hyperlipidemia    clopidogrel 75 mg oral tablet  -- 1 tab(s) by mouth once a day  -- Indication: For Stent patency    raNITIdine 150 mg oral tablet  -- 1 tab(s) by mouth 2 times a day  -- Indication: For Acid Reflux    senna oral tablet  -- 2 tab(s) by mouth once a day (at bedtime)  -- Indication: For Constipation    bisacodyl 10 mg rectal suppository  -- 1 suppository(ies) rectally once a day, As needed, Constipation  -- Indication: For Constipation    docusate sodium 100 mg oral capsule  -- 1 cap(s) by mouth 3 times a day  -- Indication: For Constipation    polyethylene glycol 3350 oral powder for reconstitution  -- 17 gram(s) by mouth once a day  -- Indication: For Constipation

## 2019-02-27 NOTE — DISCHARGE NOTE ADULT - HOSPITAL COURSE
Patient is a 58 year old female with a past medical history significant for Atrial fibrillation (off coumadin), CAD s/p stent x 1, and HTN who presented with right sided numbness for five days. CT head was performed which revealed no acute intracranial pathology but did reveal multiple nonspecific punctate calcifications. Neurology was consulted and followed in consultation during hospitalization. They recommended MRI/A of the brain. MRI/A of the brain was performed which revealed an unruptured right ophthalmic artery aneurysm - otherwise no acute intracranial pathology. She was started on Aspirin 325mg daily and Plavix 75mg daily for anticoagulation in preparation for stent placement. On 2/25/19, she underwent cerebral angiogram for placement of 2 pipeline stents for treatment of right ophthalmic artery aneurysm. Post procedural MRI/A NOVA was performed and revealed expected changes. Patient ambulating independently in recovery room. On the day of discharge, she is medically and neurosurgically stable and cleared for discharge.     More than 30 minutes were spent educating the patient and family regarding condition, medications, follow up plans, signs and symptoms to be concerned with, preparing paperwork, and questions answered regarding discharge.

## 2019-02-28 PROBLEM — I10 ESSENTIAL (PRIMARY) HYPERTENSION: Chronic | Status: ACTIVE | Noted: 2019-02-20

## 2019-03-06 ENCOUNTER — APPOINTMENT (OUTPATIENT)
Dept: NEUROSURGERY | Facility: CLINIC | Age: 59
End: 2019-03-06
Payer: MEDICAID

## 2019-03-06 VITALS
RESPIRATION RATE: 17 BRPM | BODY MASS INDEX: 29.23 KG/M2 | WEIGHT: 145 LBS | HEIGHT: 59 IN | TEMPERATURE: 98 F | OXYGEN SATURATION: 98 % | HEART RATE: 57 BPM | DIASTOLIC BLOOD PRESSURE: 74 MMHG | SYSTOLIC BLOOD PRESSURE: 113 MMHG

## 2019-03-06 DIAGNOSIS — Z86.79 PERSONAL HISTORY OF OTHER DISEASES OF THE CIRCULATORY SYSTEM: ICD-10-CM

## 2019-03-06 DIAGNOSIS — Z78.9 OTHER SPECIFIED HEALTH STATUS: ICD-10-CM

## 2019-03-06 DIAGNOSIS — Z86.39 PERSONAL HISTORY OF OTHER ENDOCRINE, NUTRITIONAL AND METABOLIC DISEASE: ICD-10-CM

## 2019-03-06 DIAGNOSIS — Z86.73 PERSONAL HISTORY OF TRANSIENT ISCHEMIC ATTACK (TIA), AND CEREBRAL INFARCTION W/OUT RESIDUAL DEFICITS: ICD-10-CM

## 2019-03-06 DIAGNOSIS — F17.200 NICOTINE DEPENDENCE, UNSPECIFIED, UNCOMPLICATED: ICD-10-CM

## 2019-03-06 PROCEDURE — 99215 OFFICE O/P EST HI 40 MIN: CPT

## 2019-03-06 RX ORDER — CLOPIDOGREL BISULFATE 75 MG/1
75 TABLET, FILM COATED ORAL DAILY
Qty: 2 | Refills: 6 | Status: DISCONTINUED | COMMUNITY
Start: 2019-03-06 | End: 2019-03-06

## 2019-03-07 PROBLEM — F17.200 CURRENT EVERY DAY SMOKER: Status: ACTIVE | Noted: 2019-03-07

## 2019-03-07 PROBLEM — Z78.9 SOCIAL ALCOHOL USE: Status: ACTIVE | Noted: 2019-03-07

## 2019-03-07 PROBLEM — Z86.79 HISTORY OF CORONARY ARTERY DISEASE: Status: RESOLVED | Noted: 2019-03-07 | Resolved: 2019-03-07

## 2019-03-07 PROBLEM — Z86.79 HISTORY OF HYPERTENSION: Status: RESOLVED | Noted: 2019-03-07 | Resolved: 2019-03-07

## 2019-03-07 PROBLEM — Z86.73 HISTORY OF CEREBROVASCULAR ACCIDENT: Status: RESOLVED | Noted: 2019-03-07 | Resolved: 2019-03-07

## 2019-03-07 PROBLEM — Z86.39 HISTORY OF HIGH CHOLESTEROL: Status: RESOLVED | Noted: 2019-03-07 | Resolved: 2019-03-07

## 2019-03-07 PROBLEM — Z86.79 HISTORY OF ATRIAL FIBRILLATION: Status: RESOLVED | Noted: 2019-03-07 | Resolved: 2019-03-07

## 2019-03-07 RX ORDER — CLOPIDOGREL 75 MG/1
75 TABLET, FILM COATED ORAL
Refills: 0 | Status: ACTIVE | COMMUNITY

## 2019-03-07 RX ORDER — RANITIDINE 75 MG/1
TABLET ORAL
Refills: 0 | Status: ACTIVE | COMMUNITY

## 2019-03-07 RX ORDER — ASPIRIN 325 MG/1
325 TABLET ORAL
Refills: 0 | Status: ACTIVE | COMMUNITY

## 2019-03-07 RX ORDER — ENALAPRIL MALEATE 20 MG/1
20 TABLET ORAL
Refills: 0 | Status: ACTIVE | COMMUNITY

## 2019-03-07 NOTE — REASON FOR VISIT
[Follow-Up: _____] : a [unfilled] follow-up visit [FreeTextEntry1] : Justyna Francisco is a 58yr old female here for a hospital follow up visit. She presented to [facility] 2/20/2019 with right sided weakness and numbness. On radiographic imaging it was incidentally found that she had a unruputured right opthalmic artery aneurysm. On 2/26/2019 she underwent cerebral angiography and embolization of the aneurysm with pipeline flow diversion. She tolerated the procedure well and recovered in the hospital after. She was on dual antiplatelet therapy consisting of aspirin and plavix. She was discharged home in stable condition 2/27/2019. Since she has been home she is doing well. Her right groin is sore but there is no hematoma. She has pain in her abdomen 4/5 in severity when palpated. She is having difficulty sleeping at night and is requesting medication.

## 2019-03-07 NOTE — RESULTS/DATA
[FreeTextEntry1] : EXAM: MR BRAIN WAW IC \par \par \par PROCEDURE DATE: 02/27/2019 \par \par \par \par \par INTERPRETATION: Contrast-enhanced MRI and MRA of the brain. MRA NOVA of the \par brain \par \par CLINICAL INDICATION: Follow-up stenting of right supraclinoid ICA aneurysm. \par \par TECHNIQUE: Multiplanar multisequence MRI of the brain and 3-D time-of-flight \par images through the [place] were obtained before and after the \par intravenous administration of 7.5 cc of Gadavist. 0 cc were discarded.. 3-D \par time-of-flight images were reformatted using MIP protocol targeted over the \par head. \par \par Additionally, NOVA MRA of the brain was obtained. \par \par COMPARISON: MRI and MRA brain 2/22/2019 \par \par FINDINGS: \par \par MRI BRAIN: \par \par There is no hydrocephalus, midline shift, mass effect, vasogenic edema, or \par acute territorial infarct. There is no focal parenchymal signal abnormality. \par Signal voids are seen within the major intracranial vessels consistent with \par their patency. \par \par There is new hyperintense signal within sulci on FLAIR images suggesting the \par presence of subarachnoid hemorrhage. \par \par 7 x 7 mm focus of signal abnormality and enhancement in the right \par inferomedial frontal lobe in the region of the previously seen right \par supraclinoid ICA aneurysm. \par \par No abnormal parenchymal or leptomeningeal enhancement. \par \par Bilateral sphenoid sinus mucosal thickening. Mastoid air cells clear. The \par orbits and craniocervical junction are unremarkable. Partially empty sella \par noted. \par \par MRA BRAIN: \par \par New susceptibility artifact in the region of the right cavernous and \par supraclinoid ICA consistent with the presence of intraluminal stents. Normal \par flow-related enhancement of the right ICA both proximal and distal to the \par region of susceptibility artifact, consistent with stent patency. \par \par 6 x 4 mm region of flow related enhancement in the right inferomedial \par frontal region consistent with residual right supraclinoid ICA aneurysm. \par \par Normal flow-related enhancement of the left skull base/intracranial internal \par carotid artery, as well as the bilateral middle, anterior, and posterior \par cerebral arteries, the basilar artery, and the intracranial vertebral \par arteries. \par \par MRA NOVA BRAIN: \par \par Volumetric flow rates of the interrogated vessels are available for review. \par \par IMPRESSION: \par \par MRI BRAIN: \par \par New hyperintense signal within sulci on FLAIR images suggesting the presence \par of subarachnoid hemorrhage. \par \par 7 x 7 mm focus of signal abnormality and enhancement in the right \par inferomedial frontal lobe in the region of the previously seen right \par supraclinoid ICA aneurysm. \par \par No abnormal parenchymal or leptomeningeal enhancement. \par \par No evidence for acute territorial infarct. \par \par MRA BRAIN: \par \par New susceptibility artifact in the region of the right cavernous and \par supraclinoid ICA consistent with the presence of intraluminal stents. Normal \par flow-related enhancement of the right ICA both proximal and distal to the \par region of susceptibility artifact, consistent with stent patency. \par \par 6 x 4 mm region of flow related enhancement in the right inferomedial \par frontal region consistent with residual right supraclinoid ICA aneurysm. \par \par No flow-limiting stenosis. \par \par MRA NOVA BRAIN: \par \par Volumetric flow rates of the interrogated vessels are available for review. \par \par \par \par \par \par \par \par \par \par \par \par \par \par CONNIE MCGREGOR M.D., ATTENDING RADIOLOGIST \par This document has been electronically signed. Feb 27 2019 5:17PM \par \par

## 2019-03-07 NOTE — REVIEW OF SYSTEMS
[Feeling Tired] : feeling tired [Dizziness] : dizziness [Eyesight Problems] : eyesight problems [Abdominal Pain] : abdominal pain [Diarrhea] : diarrhea [Negative] : Heme/Lymph [de-identified] : headache [FreeTextEntry4] : ringing in ears [FreeTextEntry7] : nausea

## 2019-03-07 NOTE — ASSESSMENT
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

## 2019-03-07 NOTE — DATA REVIEWED
[de-identified] : MRI brain post aneurysm embolization [de-identified] : MRA brain post aneurysm embolization [de-identified] : cerebral angiogram 2/26/2019

## 2019-03-07 NOTE — HISTORY OF PRESENT ILLNESS
[FreeTextEntry1] : - Hospital Course	 \par Patient is a 58 year old female with a past medical history significant for \par Atrial fibrillation (off coumadin), CAD s/p stent x 1, and HTN who presented \par with right sided numbness for five days. CT head was performed which revealed \par no acute intracranial pathology but did reveal multiple nonspecific punctate \par calcifications. Neurology was consulted and followed in consultation during \par hospitalization. They recommended MRI/A of the brain. MRI/A of the brain was \par performed which revealed an unruptured right ophthalmic artery aneurysm - \par otherwise no acute intracranial pathology. She was started on Aspirin 325mg \par daily and Plavix 75mg daily for anticoagulation in preparation for stent \par placement. On 2/25/19, she underwent cerebral angiogram for placement of 2 \par pipeline stents for treatment of right ophthalmic artery aneurysm. Post \par procedural MRI/A NOVA was performed and revealed expected changes. Patient \par ambulating independently in recovery room. On the day of discharge, she is \par medically and neurosurgically stable and cleared for discharge. \par

## 2019-03-07 NOTE — PHYSICAL EXAM
[General Appearance - Alert] : alert [General Appearance - In No Acute Distress] : in no acute distress [Person] : oriented to person [Place] : oriented to place [Time] : oriented to time [Cranial Nerves Oculomotor (III)] : extraocular motion intact [Cranial Nerves Trigeminal (V)] : facial sensation intact symmetrically [Cranial Nerves Facial (VII)] : face symmetrical [Cranial Nerves Vestibulocochlear (VIII)] : hearing was intact bilaterally [Cranial Nerves Glossopharyngeal (IX)] : tongue and palate midline [Cranial Nerves Accessory (XI - Cranial And Spinal)] : head turning and shoulder shrug symmetric [Cranial Nerves Hypoglossal (XII)] : there was no tongue deviation with protrusion [Motor Strength] : muscle strength was normal in all four extremities [Involuntary Movements] : no involuntary movements were seen [] : no respiratory distress [Abnormal Walk] : normal gait

## 2019-07-31 ENCOUNTER — EMERGENCY (EMERGENCY)
Facility: HOSPITAL | Age: 59
LOS: 1 days | Discharge: HOME | End: 2019-07-31
Attending: EMERGENCY MEDICINE
Payer: MEDICAID

## 2019-07-31 VITALS
HEIGHT: 60 IN | RESPIRATION RATE: 20 BRPM | HEART RATE: 65 BPM | WEIGHT: 145.06 LBS | DIASTOLIC BLOOD PRESSURE: 66 MMHG | TEMPERATURE: 98 F | OXYGEN SATURATION: 100 % | SYSTOLIC BLOOD PRESSURE: 101 MMHG

## 2019-07-31 DIAGNOSIS — Z90.49 ACQUIRED ABSENCE OF OTHER SPECIFIED PARTS OF DIGESTIVE TRACT: Chronic | ICD-10-CM

## 2019-07-31 LAB
ALBUMIN SERPL ELPH-MCNC: 4.5 G/DL — SIGNIFICANT CHANGE UP (ref 3.3–5)
ALP SERPL-CCNC: 78 U/L — SIGNIFICANT CHANGE UP (ref 40–120)
ALT FLD-CCNC: 16 U/L — SIGNIFICANT CHANGE UP (ref 10–45)
ANION GAP SERPL CALC-SCNC: 14 MMOL/L — SIGNIFICANT CHANGE UP (ref 5–17)
APPEARANCE UR: CLEAR — SIGNIFICANT CHANGE UP
AST SERPL-CCNC: 17 U/L — SIGNIFICANT CHANGE UP (ref 10–40)
BACTERIA # UR AUTO: NEGATIVE — SIGNIFICANT CHANGE UP
BASOPHILS # BLD AUTO: 0 K/UL — SIGNIFICANT CHANGE UP (ref 0–0.2)
BASOPHILS NFR BLD AUTO: 0.2 % — SIGNIFICANT CHANGE UP (ref 0–2)
BILIRUB SERPL-MCNC: 0.6 MG/DL — SIGNIFICANT CHANGE UP (ref 0.2–1.2)
BILIRUB UR-MCNC: NEGATIVE — SIGNIFICANT CHANGE UP
BUN SERPL-MCNC: 24 MG/DL — HIGH (ref 7–23)
CALCIUM SERPL-MCNC: 9.6 MG/DL — SIGNIFICANT CHANGE UP (ref 8.4–10.5)
CHLORIDE SERPL-SCNC: 97 MMOL/L — SIGNIFICANT CHANGE UP (ref 96–108)
CO2 SERPL-SCNC: 29 MMOL/L — SIGNIFICANT CHANGE UP (ref 22–31)
COLOR SPEC: YELLOW — SIGNIFICANT CHANGE UP
CREAT SERPL-MCNC: 0.67 MG/DL — SIGNIFICANT CHANGE UP (ref 0.5–1.3)
DIFF PNL FLD: NEGATIVE — SIGNIFICANT CHANGE UP
EOSINOPHIL # BLD AUTO: 0.1 K/UL — SIGNIFICANT CHANGE UP (ref 0–0.5)
EOSINOPHIL NFR BLD AUTO: 1.1 % — SIGNIFICANT CHANGE UP (ref 0–6)
EPI CELLS # UR: 1 /HPF — SIGNIFICANT CHANGE UP
GAS PNL BLDV: SIGNIFICANT CHANGE UP
GLUCOSE SERPL-MCNC: 102 MG/DL — HIGH (ref 70–99)
GLUCOSE UR QL: NEGATIVE — SIGNIFICANT CHANGE UP
HCT VFR BLD CALC: 37.4 % — SIGNIFICANT CHANGE UP (ref 34.5–45)
HGB BLD-MCNC: 12.5 G/DL — SIGNIFICANT CHANGE UP (ref 11.5–15.5)
HYALINE CASTS # UR AUTO: 1 /LPF — SIGNIFICANT CHANGE UP (ref 0–2)
KETONES UR-MCNC: NEGATIVE — SIGNIFICANT CHANGE UP
LEUKOCYTE ESTERASE UR-ACNC: NEGATIVE — SIGNIFICANT CHANGE UP
LIDOCAIN IGE QN: 35 U/L — SIGNIFICANT CHANGE UP (ref 7–60)
LYMPHOCYTES # BLD AUTO: 3.9 K/UL — HIGH (ref 1–3.3)
LYMPHOCYTES # BLD AUTO: 36.8 % — SIGNIFICANT CHANGE UP (ref 13–44)
MCHC RBC-ENTMCNC: 29.7 PG — SIGNIFICANT CHANGE UP (ref 27–34)
MCHC RBC-ENTMCNC: 33.5 GM/DL — SIGNIFICANT CHANGE UP (ref 32–36)
MCV RBC AUTO: 88.8 FL — SIGNIFICANT CHANGE UP (ref 80–100)
MONOCYTES # BLD AUTO: 0.6 K/UL — SIGNIFICANT CHANGE UP (ref 0–0.9)
MONOCYTES NFR BLD AUTO: 5.4 % — SIGNIFICANT CHANGE UP (ref 2–14)
NEUTROPHILS # BLD AUTO: 5.9 K/UL — SIGNIFICANT CHANGE UP (ref 1.8–7.4)
NEUTROPHILS NFR BLD AUTO: 56.5 % — SIGNIFICANT CHANGE UP (ref 43–77)
NITRITE UR-MCNC: NEGATIVE — SIGNIFICANT CHANGE UP
PH UR: 8.5 — HIGH (ref 5–8)
PLATELET # BLD AUTO: 233 K/UL — SIGNIFICANT CHANGE UP (ref 150–400)
POTASSIUM SERPL-MCNC: 3.1 MMOL/L — LOW (ref 3.5–5.3)
POTASSIUM SERPL-SCNC: 3.1 MMOL/L — LOW (ref 3.5–5.3)
PROT SERPL-MCNC: 7.1 G/DL — SIGNIFICANT CHANGE UP (ref 6–8.3)
PROT UR-MCNC: SIGNIFICANT CHANGE UP
RBC # BLD: 4.22 M/UL — SIGNIFICANT CHANGE UP (ref 3.8–5.2)
RBC # FLD: 12.4 % — SIGNIFICANT CHANGE UP (ref 10.3–14.5)
RBC CASTS # UR COMP ASSIST: 3 /HPF — SIGNIFICANT CHANGE UP (ref 0–4)
SODIUM SERPL-SCNC: 140 MMOL/L — SIGNIFICANT CHANGE UP (ref 135–145)
SP GR SPEC: 1.02 — SIGNIFICANT CHANGE UP (ref 1.01–1.02)
TROPONIN T, HIGH SENSITIVITY RESULT: <6 NG/L — SIGNIFICANT CHANGE UP (ref 0–51)
UROBILINOGEN FLD QL: NEGATIVE — SIGNIFICANT CHANGE UP
WBC # BLD: 10.5 K/UL — SIGNIFICANT CHANGE UP (ref 3.8–10.5)
WBC # FLD AUTO: 10.5 K/UL — SIGNIFICANT CHANGE UP (ref 3.8–10.5)
WBC UR QL: 5 /HPF — SIGNIFICANT CHANGE UP (ref 0–5)

## 2019-07-31 PROCEDURE — 99285 EMERGENCY DEPT VISIT HI MDM: CPT

## 2019-07-31 PROCEDURE — 74176 CT ABD & PELVIS W/O CONTRAST: CPT | Mod: 26

## 2019-07-31 RX ORDER — KETOROLAC TROMETHAMINE 30 MG/ML
15 SYRINGE (ML) INJECTION ONCE
Refills: 0 | Status: DISCONTINUED | OUTPATIENT
Start: 2019-07-31 | End: 2019-07-31

## 2019-07-31 RX ORDER — MORPHINE SULFATE 50 MG/1
2 CAPSULE, EXTENDED RELEASE ORAL ONCE
Refills: 0 | Status: DISCONTINUED | OUTPATIENT
Start: 2019-07-31 | End: 2019-07-31

## 2019-07-31 RX ORDER — LIDOCAINE 4 G/100G
10 CREAM TOPICAL ONCE
Refills: 0 | Status: COMPLETED | OUTPATIENT
Start: 2019-07-31 | End: 2019-07-31

## 2019-07-31 RX ORDER — AZITHROMYCIN 500 MG/1
500 TABLET, FILM COATED ORAL ONCE
Refills: 0 | Status: COMPLETED | OUTPATIENT
Start: 2019-07-31 | End: 2019-07-31

## 2019-07-31 RX ORDER — SODIUM CHLORIDE 9 MG/ML
1000 INJECTION INTRAMUSCULAR; INTRAVENOUS; SUBCUTANEOUS ONCE
Refills: 0 | Status: COMPLETED | OUTPATIENT
Start: 2019-07-31 | End: 2019-07-31

## 2019-07-31 RX ORDER — POTASSIUM CHLORIDE 20 MEQ
40 PACKET (EA) ORAL ONCE
Refills: 0 | Status: COMPLETED | OUTPATIENT
Start: 2019-07-31 | End: 2019-07-31

## 2019-07-31 RX ORDER — FAMOTIDINE 10 MG/ML
20 INJECTION INTRAVENOUS ONCE
Refills: 0 | Status: COMPLETED | OUTPATIENT
Start: 2019-07-31 | End: 2019-07-31

## 2019-07-31 RX ORDER — ONDANSETRON 8 MG/1
4 TABLET, FILM COATED ORAL ONCE
Refills: 0 | Status: COMPLETED | OUTPATIENT
Start: 2019-07-31 | End: 2019-07-31

## 2019-07-31 RX ADMIN — MORPHINE SULFATE 2 MILLIGRAM(S): 50 CAPSULE, EXTENDED RELEASE ORAL at 22:10

## 2019-07-31 RX ADMIN — Medication 15 MILLIGRAM(S): at 22:10

## 2019-07-31 RX ADMIN — SODIUM CHLORIDE 2000 MILLILITER(S): 9 INJECTION INTRAMUSCULAR; INTRAVENOUS; SUBCUTANEOUS at 22:10

## 2019-07-31 RX ADMIN — ONDANSETRON 4 MILLIGRAM(S): 8 TABLET, FILM COATED ORAL at 22:10

## 2019-07-31 NOTE — ED PROVIDER NOTE - NSFOLLOWUPINSTRUCTIONS_ED_ALL_ED_FT
You were evaluated in the ER at [facility] for abdominal pain. Your labs showed a low potassium level and you were given potassium repletion. A CT scan of your abdomen showed a 2mm right sided kidney stone, but was otherwise normal.     - If you develop worsening abdominal pain, nausea/vomiting, fevers/chills, diarrhea or inability to tolerate any food/liquids, or any new or worsening symptoms, please return to the ER for further evaluation     - Please continue to take tylenol, pepcid and maalox for pain control.     - Please follow up with your primary care physician regarding your abdominal pain

## 2019-07-31 NOTE — ED PROVIDER NOTE - PLAN OF CARE
58F h/o lap travis, lap appendectomy, R ophthalmic artery aneurysm s/p angioplasty/stent (2019) and CAD s/p stent (2019) p/w abdominal pain. Afebrile, HD normal. Exam notable for LUQ TTP and L CVA tenderness. Differential includes nephro/urolithiasis, colitis, pancreatitis, gastroenteritis, travelers diarrhea.  - Labs, troponin, EKG, lipase  - CT A/P

## 2019-07-31 NOTE — ED PROVIDER NOTE - CARE PLAN
Principal Discharge DX:	Abdominal pain  Assessment and plan of treatment:	58F h/o lap travis, lap appendectomy, R ophthalmic artery aneurysm s/p angioplasty/stent (2019) and CAD s/p stent (2019) p/w abdominal pain. Afebrile, HD normal. Exam notable for LUQ TTP and L CVA tenderness. Differential includes nephro/urolithiasis, colitis, pancreatitis, gastroenteritis, travelers diarrhea.  - Labs, troponin, EKG, lipase  - CT A/P

## 2019-07-31 NOTE — ED ADULT NURSE NOTE - MODE OF DISCHARGE
PATIENT INFORMATION    Anticipatory guidance:  Avoid potential choking hazards (large, spherical, or coin shaped foods)  Avoid small toys (choking hazard)  Caution with possible poisons (including pills, plants, cosmetics)  Discipline issues (limit-setting, positive reinforcement)  Importance of varied diet  Never leave unattended  Read together  Toilet training only possible after 4 years old  Wind-down activities to help with sleep    Follow-Up  - Return for your 2 1/2 year (30 months) well child visit.    4 years old Health and Safety Tips - The following hyperlinks are available to access via MyChart    [facility] 4 Years Old Parent Education    Futuros Brillantes 4 años de edad (Educación para los padres) - Español    Common dosing for acetaminophen and ibuprofen:   Acetaminophen (Tylenol, etc.) can be given every 4 hours.  · Infant Drops: 160mg/5ml for  Weight 18-23 lbs 24-35 lbs   Dose 3.75 ml 5 ml      · Children's Elixir: 160mg/5ml  Weight 24-35 lbs 36-47 lbs 48-59 lbs 60-71 lsb 72-95 lbs   Dose 5 ml 7.5 ml 10 ml 12.5 ml 15 ml     Ibuprofen (motrin) can be given every 6 hours.  · Infant Drops: 50mg/1.25ml  Weight 18-23 lbs   Dose 1.875     · Children's Elixir 100mg/5ml   Weight 24-35 lbs 36-47 lbs 48-59 lbs 60-71 lbs 72-95 lbs   Dose 1 tsp 1 1/2 tsp 2 tsp 2 1/2 tsp 3 tsp       Additional Educational Resources:  For additional resources regarding your symptoms, diagnosis, or further health information, please visit the Discover a Healthier You section on [email or web address] or the Online Health Resources section in [place].  
Ambulatory

## 2019-07-31 NOTE — ED PROVIDER NOTE - PROGRESS NOTE DETAILS
1 hour post-pepcid maalox and visc lidocaine. Patient feeling well. Abd exam: soft, NT, ND. No guarding. Will dc home.

## 2019-07-31 NOTE — ED PROVIDER NOTE - OBJECTIVE STATEMENT
58F h/o lap travis, lap appendectomy, afib and CAD s/p stent (2019) p/w abdominal pain. 58F h/o lap travis, lap appendectomy, R ophthalmic artery aneurysm s/p angioplasty/stent (2019) and CAD s/p stent (2019) p/w abdominal pain. 58F h/o lap travis, lap appendectomy, R ophthalmic artery aneurysm s/p angioplasty/stent (2019) and CAD s/p stent (2019) p/w abdominal pain. Pt states she developed sudden onset epigastric and LUQ pain on monday after a meal. The pain is intermittent, sharp and radiates across the abdomen and to the back. On tuesday, pt travelled from [facility] to [facility] and had 1 episode of bilious emesis on the plane. An EMT in the airport gave her propinox, which gave her some mild relief. Today pt had another episode of emesis and bloody diarrhea. Pt currently endorses severe abdominal pain, nausea and shortness of breath, but denies fevers/chills, headaches, blurry vision, hematuria/dysuria 58F h/o lap travis, lap appendectomy, R ophthalmic artery aneurysm s/p angioplasty/stent (2019) and CAD s/p stent (2019) p/w abdominal pain. Pt states she developed sudden onset epigastric and LUQ pain on monday after a meal. The pain is intermittent, sharp and radiates across the abdomen and to the back. On tuesday, pt travelled from [facility] to [facility] and had 1 episode of bilious emesis on the plane. An EMT in the airport gave her propinox, which gave her some mild relief. Today pt had another episode of emesis and bloody diarrhea. Pt currently endorses severe abdominal pain, nausea and shortness of breath, but denies fevers/chills, headaches, blurry vision, hematuria/dysuria, vaginal discharge, chest pain. 58F h/o lap travis, lap appendectomy, HTN, R ophthalmic artery aneurysm s/p angioplasty/stent (2019) and CAD s/p stent (2019) p/w abdominal pain. Pt states she developed sudden onset epigastric and LUQ pain on monday after a meal. The pain is intermittent, sharp and radiates across the abdomen and to the back. On tuesday, pt travelled from [facility] to [facility] and had 1 episode of bilious emesis on the plane. An EMT in the airport gave her propinox, which gave her some mild relief. Today pt had another episode of emesis and bloody diarrhea. Pt currently endorses severe abdominal pain, nausea and shortness of breath, but denies fevers/chills, headaches, blurry vision, hematuria/dysuria, vaginal discharge, chest pain.

## 2019-07-31 NOTE — ED ADULT NURSE NOTE - NSIMPLEMENTINTERV_GEN_ALL_ED
Implemented All Universal Safety Interventions:  [place] to call system. Call bell, personal items and telephone within reach. Instruct patient to call for assistance. Room bathroom lighting operational. Non-slip footwear when patient is off stretcher. Physically safe environment: no spills, clutter or unnecessary equipment. Stretcher in lowest position, wheels locked, appropriate side rails in place.

## 2019-07-31 NOTE — ED PROVIDER NOTE - CLINICAL SUMMARY MEDICAL DECISION MAKING FREE TEXT BOX
58F h/o lap travis, lap appendectomy, R ophthalmic artery aneurysm s/p angioplasty/stent (2019) and CAD s/p stent (2019) p/w abdominal pain. Afebrile, HD normal. Exam notable for LUQ TTP and L CVA tenderness. Differential includes nephro/urolithiasis, pancreatitis, gastroenteritis, travelers diarrhea.  - Labs, troponin, EKG, lipase  - CT A/P 58F h/o lap travis, lap appendectomy, R ophthalmic artery aneurysm s/p angioplasty/stent (2019) and CAD s/p stent (2019) p/w abdominal pain. Afebrile, HD normal. Exam notable for LUQ TTP and L CVA tenderness. Differential includes nephro/urolithiasis, colitis, pancreatitis, gastroenteritis, travelers diarrhea.  - Labs, troponin, EKG, lipase  - CT A/P

## 2019-07-31 NOTE — ED ADULT NURSE NOTE - OBJECTIVE STATEMENT
58 year old female comes to the ED c/o headache/ right sided abdominal pain that radiates to left side of patient's back, 1 episode of bloody diarrhea and vomiting. Patient states the pain began on Monday after eating and began to become more sever s/p flight home from [facility] today. Patient has a PMH of cerebral aneurysm with a stent in place in march, CAD with stents on Plavix and HTN. Patient primarily Thai speaking with daughter at the bedside to translate. Tenderness can be noted to the left upper quadrant and wraps around in a band like motion to the left side of her back. Patient is a/ox3, VSS, ambulatory, sensory in tact, able to move all extremities, follow commands and in NAD at this time. Lung sounds are clear and equal b/l with no labored breathing, peripheral pulses strong and equal b/l with no edema noted, skin is warm, dry and intact. Patient denies CP/SOB, dizziness/lightheadedness/vision changes, numbness/tingling/weakness, hematuria/dysuria/frequency.

## 2019-08-01 VITALS
SYSTOLIC BLOOD PRESSURE: 100 MMHG | DIASTOLIC BLOOD PRESSURE: 62 MMHG | RESPIRATION RATE: 20 BRPM | OXYGEN SATURATION: 100 % | TEMPERATURE: 98 F | HEART RATE: 64 BPM

## 2019-08-01 PROCEDURE — 83605 ASSAY OF LACTIC ACID: CPT

## 2019-08-01 PROCEDURE — 84295 ASSAY OF SERUM SODIUM: CPT

## 2019-08-01 PROCEDURE — 82435 ASSAY OF BLOOD CHLORIDE: CPT

## 2019-08-01 PROCEDURE — 84132 ASSAY OF SERUM POTASSIUM: CPT

## 2019-08-01 PROCEDURE — 85027 COMPLETE CBC AUTOMATED: CPT

## 2019-08-01 PROCEDURE — 83690 ASSAY OF LIPASE: CPT

## 2019-08-01 PROCEDURE — 96375 TX/PRO/DX INJ NEW DRUG ADDON: CPT

## 2019-08-01 PROCEDURE — 74176 CT ABD & PELVIS W/O CONTRAST: CPT

## 2019-08-01 PROCEDURE — 82803 BLOOD GASES ANY COMBINATION: CPT

## 2019-08-01 PROCEDURE — 84484 ASSAY OF TROPONIN QUANT: CPT

## 2019-08-01 PROCEDURE — 82947 ASSAY GLUCOSE BLOOD QUANT: CPT

## 2019-08-01 PROCEDURE — 99284 EMERGENCY DEPT VISIT MOD MDM: CPT | Mod: 25

## 2019-08-01 PROCEDURE — 87086 URINE CULTURE/COLONY COUNT: CPT

## 2019-08-01 PROCEDURE — 81001 URINALYSIS AUTO W/SCOPE: CPT

## 2019-08-01 PROCEDURE — 93005 ELECTROCARDIOGRAM TRACING: CPT

## 2019-08-01 PROCEDURE — 82330 ASSAY OF CALCIUM: CPT

## 2019-08-01 PROCEDURE — 85014 HEMATOCRIT: CPT

## 2019-08-01 PROCEDURE — 96374 THER/PROPH/DIAG INJ IV PUSH: CPT

## 2019-08-01 PROCEDURE — 80053 COMPREHEN METABOLIC PANEL: CPT

## 2019-08-01 RX ADMIN — LIDOCAINE 10 MILLILITER(S): 4 CREAM TOPICAL at 00:08

## 2019-08-01 RX ADMIN — Medication 30 MILLILITER(S): at 00:08

## 2019-08-01 RX ADMIN — Medication 40 MILLIEQUIVALENT(S): at 00:10

## 2019-08-01 RX ADMIN — AZITHROMYCIN 500 MILLIGRAM(S): 500 TABLET, FILM COATED ORAL at 00:09

## 2019-08-01 RX ADMIN — FAMOTIDINE 20 MILLIGRAM(S): 10 INJECTION INTRAVENOUS at 00:08

## 2019-08-02 LAB
CULTURE RESULTS: SIGNIFICANT CHANGE UP
SPECIMEN SOURCE: SIGNIFICANT CHANGE UP

## 2019-08-26 ENCOUNTER — APPOINTMENT (OUTPATIENT)
Dept: MRI IMAGING | Facility: HOSPITAL | Age: 59
End: 2019-08-26

## 2019-08-26 ENCOUNTER — OUTPATIENT (OUTPATIENT)
Dept: OUTPATIENT SERVICES | Facility: HOSPITAL | Age: 59
LOS: 1 days | End: 2019-08-26
Payer: MEDICAID

## 2019-08-26 DIAGNOSIS — G93.9 DISORDER OF BRAIN, UNSPECIFIED: ICD-10-CM

## 2019-08-26 DIAGNOSIS — Z90.49 ACQUIRED ABSENCE OF OTHER SPECIFIED PARTS OF DIGESTIVE TRACT: Chronic | ICD-10-CM

## 2019-08-26 DIAGNOSIS — I67.1 CEREBRAL ANEURYSM, NONRUPTURED: ICD-10-CM

## 2019-08-26 PROCEDURE — 70544 MR ANGIOGRAPHY HEAD W/O DYE: CPT

## 2019-08-26 PROCEDURE — 70544 MR ANGIOGRAPHY HEAD W/O DYE: CPT | Mod: 26

## 2019-09-16 ENCOUNTER — APPOINTMENT (OUTPATIENT)
Dept: NEUROSURGERY | Facility: CLINIC | Age: 59
End: 2019-09-16
Payer: MEDICAID

## 2019-09-16 VITALS
BODY MASS INDEX: 29.23 KG/M2 | TEMPERATURE: 98.2 F | HEIGHT: 59 IN | SYSTOLIC BLOOD PRESSURE: 131 MMHG | WEIGHT: 145 LBS | OXYGEN SATURATION: 96 % | RESPIRATION RATE: 18 BRPM | HEART RATE: 66 BPM | DIASTOLIC BLOOD PRESSURE: 83 MMHG

## 2019-09-16 PROCEDURE — 99213 OFFICE O/P EST LOW 20 MIN: CPT

## 2019-09-16 RX ORDER — CLOPIDOGREL BISULFATE 75 MG/1
75 TABLET, FILM COATED ORAL DAILY
Qty: 30 | Refills: 6 | Status: ACTIVE | COMMUNITY
Start: 2019-03-06 | End: 1900-01-01

## 2019-09-25 NOTE — REASON FOR VISIT
[Follow-Up: _____] : a [unfilled] follow-up visit [FreeTextEntry1] : Justyna Francisco is a 58yr old female here for a follow up visit after recent imaging.  On 2/26/2019 she underwent cerebral angiography and embolization of the aneurysm with pipeline flow diversion. She tolerated the procedure well and remains neurologically intact.

## 2019-09-25 NOTE — RESULTS/DATA
[FreeTextEntry1] : \par EXAM: MR ANGIO BRAIN \par \par \par PROCEDURE DATE: 08/26/2019 \par \par \par \par \par INTERPRETATION: Clinical indication: Pipeline stent deployment for right \par ophthalmic level aneurysm, follow-up noninvasive flow MR angiography \par \par 3-D axial noncontrast MRA were performed on the cervical and intracranial \par vessels, respectively. Intravascular flow quantification was performed using \par gated 2D phase contrast MR, imaged perpendicular to the vessel axis. Images \par were post processed NOVA software and a NOVA flow study report is available. \par \par Comparison is made with the prior noninvasive flow examination of 2/27/2019. \par \par There is signal dropout overlying the right supraclinoid internal carotid \par artery related to the presence of a pipeline stent. No flow within the \par aneurysm is identified. There is good intracranial flow. There is no \par significant intracranial stenosis. There is no significant change since the \par prior examination. Flow is as follows in milliliters per minute. \par \par \par \par CHAITANYA 301, RMCA 159, RACA 145, RACA2 84 compared with prior 2/27/2019 \par CHAITANYA 269, RMCA 189, RACA 123, RACA2 74. \par \par LICA 207, LMCA 170, LACA 64, LACA2 69 compared with prior \par LICA 233, LMCA 185, LACA 75, LACA2 79. \par \par RVA 83, , , RPCA 95, LPCA 90 compared with prior \par RVA 82, , , RPCA 86, LPCA 95. \par \par \par \par Impression: Pipeline stent fin the right supraclinoid internal carotid \par artery for embolization of a right ophthalmic level aneurysm. Signal dropout \par overlying the right supraclinoid internal carotid artery. No change from \par 2/27/2019. Good intracranial flow. \par \par \par \par \par \par \par \par \par \par SAYDA HAMMONDS M.D., ATTENDING RADIOLOGIST \par This document has been electronically signed. Aug 26 2019 3:40PM \par \par \par \par

## 2019-09-25 NOTE — PHYSICAL EXAM
[General Appearance - Alert] : alert [General Appearance - In No Acute Distress] : in no acute distress [Place] : oriented to place [Person] : oriented to person [Motor Strength] : muscle strength was normal in all four extremities [Time] : oriented to time [Involuntary Movements] : no involuntary movements were seen [] : no respiratory distress [Abnormal Walk] : normal gait

## 2019-09-25 NOTE — ASSESSMENT
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 \par

## 2019-10-07 ENCOUNTER — OUTPATIENT (OUTPATIENT)
Dept: OUTPATIENT SERVICES | Facility: HOSPITAL | Age: 59
LOS: 1 days | End: 2019-10-07
Payer: MEDICAID

## 2019-10-07 VITALS
HEIGHT: 60 IN | DIASTOLIC BLOOD PRESSURE: 78 MMHG | WEIGHT: 162.04 LBS | HEART RATE: 78 BPM | OXYGEN SATURATION: 99 % | RESPIRATION RATE: 16 BRPM | SYSTOLIC BLOOD PRESSURE: 128 MMHG | TEMPERATURE: 98 F

## 2019-10-07 DIAGNOSIS — Z90.49 ACQUIRED ABSENCE OF OTHER SPECIFIED PARTS OF DIGESTIVE TRACT: Chronic | ICD-10-CM

## 2019-10-07 DIAGNOSIS — Z01.818 ENCOUNTER FOR OTHER PREPROCEDURAL EXAMINATION: ICD-10-CM

## 2019-10-07 DIAGNOSIS — I67.1 CEREBRAL ANEURYSM, NONRUPTURED: ICD-10-CM

## 2019-10-07 DIAGNOSIS — I10 ESSENTIAL (PRIMARY) HYPERTENSION: ICD-10-CM

## 2019-10-07 LAB
ANION GAP SERPL CALC-SCNC: 11 MMOL/L — SIGNIFICANT CHANGE UP (ref 5–17)
BLD GP AB SCN SERPL QL: NEGATIVE — SIGNIFICANT CHANGE UP
BUN SERPL-MCNC: 18 MG/DL — SIGNIFICANT CHANGE UP (ref 7–23)
CALCIUM SERPL-MCNC: 8.8 MG/DL — SIGNIFICANT CHANGE UP (ref 8.4–10.5)
CHLORIDE SERPL-SCNC: 103 MMOL/L — SIGNIFICANT CHANGE UP (ref 96–108)
CO2 SERPL-SCNC: 23 MMOL/L — SIGNIFICANT CHANGE UP (ref 22–31)
CREAT SERPL-MCNC: 0.51 MG/DL — SIGNIFICANT CHANGE UP (ref 0.5–1.3)
GLUCOSE SERPL-MCNC: 108 MG/DL — HIGH (ref 70–99)
HCT VFR BLD CALC: 36.9 % — SIGNIFICANT CHANGE UP (ref 34.5–45)
HGB BLD-MCNC: 11.7 G/DL — SIGNIFICANT CHANGE UP (ref 11.5–15.5)
MCHC RBC-ENTMCNC: 28.9 PG — SIGNIFICANT CHANGE UP (ref 27–34)
MCHC RBC-ENTMCNC: 31.7 GM/DL — LOW (ref 32–36)
MCV RBC AUTO: 91.1 FL — SIGNIFICANT CHANGE UP (ref 80–100)
PLATELET # BLD AUTO: 193 K/UL — SIGNIFICANT CHANGE UP (ref 150–400)
POTASSIUM SERPL-MCNC: 4.1 MMOL/L — SIGNIFICANT CHANGE UP (ref 3.5–5.3)
POTASSIUM SERPL-SCNC: 4.1 MMOL/L — SIGNIFICANT CHANGE UP (ref 3.5–5.3)
RBC # BLD: 4.05 M/UL — SIGNIFICANT CHANGE UP (ref 3.8–5.2)
RBC # FLD: 13.8 % — SIGNIFICANT CHANGE UP (ref 10.3–14.5)
RH IG SCN BLD-IMP: POSITIVE — SIGNIFICANT CHANGE UP
SODIUM SERPL-SCNC: 137 MMOL/L — SIGNIFICANT CHANGE UP (ref 135–145)
WBC # BLD: 5.74 K/UL — SIGNIFICANT CHANGE UP (ref 3.8–10.5)
WBC # FLD AUTO: 5.74 K/UL — SIGNIFICANT CHANGE UP (ref 3.8–10.5)

## 2019-10-07 PROCEDURE — 86850 RBC ANTIBODY SCREEN: CPT

## 2019-10-07 PROCEDURE — 86900 BLOOD TYPING SEROLOGIC ABO: CPT

## 2019-10-07 PROCEDURE — 86901 BLOOD TYPING SEROLOGIC RH(D): CPT

## 2019-10-07 PROCEDURE — 80048 BASIC METABOLIC PNL TOTAL CA: CPT

## 2019-10-07 PROCEDURE — 85027 COMPLETE CBC AUTOMATED: CPT

## 2019-10-07 PROCEDURE — G0463: CPT

## 2019-10-07 NOTE — H&P PST ADULT - NSICDXPASTMEDICALHX_GEN_ALL_CORE_FT
PAST MEDICAL HISTORY:  Afib     HTN (hypertension)     Stented coronary artery x1 , 7 years ago , in [place] [facility] [place] [facility]\"" PAST MEDICAL HISTORY:  Afib off coumadin    Aneurysm of ophthalmic artery Right, s/p embolization with pipeline flow divertion 02/26/19, Dr Oshea    HTN (hypertension)     Stented coronary artery x1 , 7 years ago , [place] [facility]    Tinnitus bilateral ear, Tinnitus  with noise

## 2019-10-07 NOTE — H&P PST ADULT - HISTORY OF PRESENT ILLNESS
Patient is a 58 year old female  with a past medical history significant for afib ( off coumadin) cad s/p stent x  1,  htn,  presents with right sided  numbness for the past five days.   Patient reports waking up five days prior to admission with numbness of the right side of her tongue , symptoms continued to progress over the next few days and now include  most of the face as well as the right arm, and leg , she has associated right arm and leg weakness.   She reports it feels like " its asleep" no tingling  or pins and needles . She reports no dysarthria and no dysphagia .   on day of admission, patient was evaluated by her cardiologist for routine visit and was subsequently referred to the hospital for further management. Patient reports no fever or chills , no visual changes,  and no headaches. She has no chest pain, shortness of breath, or palpitations. She reports no neck pain or back pain. She reports no recent illness and no sick contacts. No recent travel. 58 year old Latvian speaking female  with a past medical history significant for afib ( off coumadin) cad s/p stent x 1 (2012),  htn,  s/p right sided  numbness  associated with  right arm and leg weakness 02/2019, s/p embolization of unruptured right ophthalmic artery aneurysm with pipeline flow diversion on 02/26/19(on Asprin 325 mgs & Plavix), presents for follow up cerebral angiogram on 10/15/19.

## 2019-10-07 NOTE — H&P PST ADULT - SOURCE OF INFORMATION, PROFILE
family/patient/Ivorian speaking, patient prefers her daughter to traslate( [phone] family/patient/Urdu speaking, patient prefers her daughter to translate to her (Ms Knight , [phone]

## 2019-10-07 NOTE — H&P PST ADULT - NSICDXPROBLEM_GEN_ALL_CORE_FT
PROBLEM DIAGNOSES  Problem: Aneurysm of ophthalmic artery  Assessment and Plan: cerebral angiogram   lab sent for cbc, bmp & T&S   Instructed to continue meds/Asprin & Plavix as per surgeon  &  take with sips of water in AM the day of surgery     Problem: HTN (hypertension)  Assessment and Plan: Instructed to continue meds &  take with sips of water in AM the day of surgery

## 2019-10-07 NOTE — H&P PST ADULT - RS GEN PE MLT RESP DETAILS PC
no rales/no rhonchi/breath sounds equal/clear to auscultation bilaterally/no chest wall tenderness/no intercostal retractions

## 2019-10-07 NOTE — H&P PST ADULT - NSICDXFAMILYHX_GEN_ALL_CORE_FT
FAMILY HISTORY:  Father  Still living? Unknown  Family history of acute myocardial infarction, Age at diagnosis: Age Unknown    Sibling  Still living? Unknown  Family history of brain cancer, Age at diagnosis: Age Unknown

## 2019-10-07 NOTE — H&P PST ADULT - NEUROLOGICAL DETAILS
sensation intact/alert and oriented x 3/responds to pain/normal strength/cranial nerves intact/responds to verbal commands/no spontaneous movement

## 2019-10-07 NOTE — H&P PST ADULT - HEALTH CARE MAINTENANCE
Flu vaccine in 2019- 2 weeks ago  On yearly Annual physical  Last colonoscopy / endoscopy - 2019, up to date , normal

## 2019-10-15 ENCOUNTER — OUTPATIENT (OUTPATIENT)
Dept: OUTPATIENT SERVICES | Facility: HOSPITAL | Age: 59
LOS: 1 days | End: 2019-10-15
Payer: MEDICAID

## 2019-10-15 ENCOUNTER — APPOINTMENT (OUTPATIENT)
Dept: NEUROSURGERY | Facility: HOSPITAL | Age: 59
End: 2019-10-15
Payer: MEDICAID

## 2019-10-15 DIAGNOSIS — I67.1 CEREBRAL ANEURYSM, NONRUPTURED: ICD-10-CM

## 2019-10-15 DIAGNOSIS — Z90.49 ACQUIRED ABSENCE OF OTHER SPECIFIED PARTS OF DIGESTIVE TRACT: Chronic | ICD-10-CM

## 2019-10-15 PROCEDURE — C1887: CPT

## 2019-10-15 PROCEDURE — 36224 PLACE CATH CAROTD ART: CPT

## 2019-10-15 PROCEDURE — C1769: CPT

## 2019-10-15 PROCEDURE — C1894: CPT

## 2019-10-15 PROCEDURE — 36224 PLACE CATH CAROTD ART: CPT | Mod: RT

## 2019-10-15 RX ORDER — SODIUM CHLORIDE 9 MG/ML
1000 INJECTION INTRAMUSCULAR; INTRAVENOUS; SUBCUTANEOUS
Refills: 0 | Status: DISCONTINUED | OUTPATIENT
Start: 2019-10-15 | End: 2019-11-17

## 2019-10-15 NOTE — CHART NOTE - NSCHARTNOTEFT_GEN_A_CORE
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 1000 milliLiter(s) IV Continuous <Continuous>      Labs:                   Blood Bank:       Assessment/Plan:     This is a 58y  year old right  hand dominant Female  presents with   Patient presents to neuro-IR for selective cerebral angiography.   Procedure, goals, risks, benefits and alternatives  were discussed with patient and patient's family.  All questions were answered.  Risks include but are not limited to stroke, vessel injury, hemorrhage, and or right  groin hematoma.  Patient demonstrates understanding  of all risks involved with this procedure and wishes to continue.   Appropriate  content was obtained from patient and consent is in the patient's chart. Interventional Neuro Radiology  Pre-Procedure Note PA-C    This is a 58 year old right hand dominant female            Allergies: No Known Allergies  PMHX: tinnitus bilateral ear, right ophthalmic artery, AFIB, hypertension,    PSHX: embolization with pipeline flow diversion 02/26/19 , stented coronary artery: x1 , 7 years ago , in [facility], cholecystectomy, appendectomy  Social History: non-smoker   FAMILY HISTORY: non-contributory   Current Medications: ASA 325mg, Plavix 75mg     Complete Blood Count (10.07.19 )    WBC Count: 5.74 K/uL    Hemoglobin: 11.7 g/dL    Hematocrit: 36.9 %    Platelet Count - Automated: 193 K/uL    Basic Metabolic Panel (10.07.19 )    Sodium, Serum: 137 mmol/L    Potassium, Serum: 4.1 mmol/L    Chloride, Serum: 103 mmol/L    Carbon Dioxide, Serum: 23 mmol/L    Anion Gap, Serum: 11 mmol/L    Blood Urea Nitrogen, Serum: 18 mg/dL    Creatinine, Serum: 0.51 mg/dL    Glucose, Serum: 108 mg/dL    Blood Bank: A positive (10.07.19 @ 19:37)      Assessment/Plan:   This is a 58 year old right  hand dominant female   Procedure, goals, risks, benefits and alternatives were discussed with patient and patient's family. All questions were answered. Risks include but are not limited to stroke, vessel injury, hemorrhage, and or right groin hematoma. Patient demonstrates understanding of all risks involved with this procedure and wishes to continue.  Appropriate content was obtained from patient and consent is in the patient's chart. 58 year old Lao speaking female  with a past medical history significant for afib ( off coumadin) cad s/p stent x 1 (2012),  htn,  s/p right sided  numbness  associated with  right arm and leg weakness 02/2019, s/p embolization of unruptured right ophthalmic artery aneurysm with pipeline flow diversion on 02/26/19(on Asprin 325 mgs & Plavix), presents for follow up cerebral angiogram on 10/15/19.Interventional Neuro Radiology  Pre-Procedure Note PA-C    This is a 58 year old right hand dominant female with a past medical history significant for AFIB, cardiac stent placement, hypertension            Allergies: No Known Allergies  PMHX: tinnitus bilateral ear, right ophthalmic artery, AFIB, hypertension,    PSHX: embolization with pipeline flow diversion 02/26/19 , stented coronary artery: x1 , 7 years ago , in [facility], cholecystectomy, appendectomy  Social History: non-smoker   FAMILY HISTORY: non-contributory   Current Medications: ASA 325mg, Plavix 75mg     Complete Blood Count (10.07.19 )    WBC Count: 5.74 K/uL    Hemoglobin: 11.7 g/dL    Hematocrit: 36.9 %    Platelet Count - Automated: 193 K/uL    Basic Metabolic Panel (10.07.19 )    Sodium, Serum: 137 mmol/L    Potassium, Serum: 4.1 mmol/L    Chloride, Serum: 103 mmol/L    Carbon Dioxide, Serum: 23 mmol/L    Anion Gap, Serum: 11 mmol/L    Blood Urea Nitrogen, Serum: 18 mg/dL    Creatinine, Serum: 0.51 mg/dL    Glucose, Serum: 108 mg/dL    Blood Bank: A positive (10.07.19 @ 19:37)      Assessment/Plan:   This is a 58 year old right  hand dominant female   Procedure, goals, risks, benefits and alternatives were discussed with patient and patient's family. All questions were answered. Risks include but are not limited to stroke, vessel injury, hemorrhage, and or right groin hematoma. Patient demonstrates understanding of all risks involved with this procedure and wishes to continue.  Appropriate content was obtained from patient and consent is in the patient's chart. Interventional Neuro Radiology  Pre-Procedure Note PA-C    This is a 58 year old right hand dominant female with a past medical history significant for AFIB, cardiac stent placement, hypertension, and status post embolization of an unruptured right ophthalmic artery aneurysm with pipeline stents, who returns to Neuro IR for a selective cerebral angiography to monitor aneurysm.     Upon exam patient is awake, alert, and oriented X3, speech is fluent recent and remote memory intact, speech is fluent, PERRL, EOMI, tongue is midline, +facial symmetry, sensation intact, moves all extremities and ambulates without assist.     Allergies: No Known Allergies  PMHX: tinnitus bilateral ear, right ophthalmic artery, AFIB, hypertension,    PSHX: embolization with pipeline flow diversion 02/26/19 , stented coronary artery: x1 , 7 years ago , in [facility], cholecystectomy, appendectomy  Social History: non-smoker   FAMILY HISTORY: non-contributory   Current Medications: ASA 325mg, Plavix 75mg     Complete Blood Count (10.07.19 )    WBC Count: 5.74 K/uL    Hemoglobin: 11.7 g/dL    Hematocrit: 36.9 %    Platelet Count - Automated: 193 K/uL    Basic Metabolic Panel (10.07.19 )    Sodium, Serum: 137 mmol/L    Potassium, Serum: 4.1 mmol/L    Chloride, Serum: 103 mmol/L    Carbon Dioxide, Serum: 23 mmol/L    Anion Gap, Serum: 11 mmol/L    Blood Urea Nitrogen, Serum: 18 mg/dL    Creatinine, Serum: 0.51 mg/dL    Glucose, Serum: 108 mg/dL    Blood Bank: A positive (10.07.19 @ 19:37)    Assessment/Plan:   This is a 58 year old right hand dominant female status post endovascular treatment of right ophthalmic artery aneurysm who presents to Neuro IR for a selective cerebral angiography to monitor aneurysm. Procedure, goals, risks, benefits and alternatives were discussed with patient and patient's family. All questions were answered. Risks include but are not limited to stroke, vessel injury, hemorrhage, and or right groin hematoma. Patient demonstrates understanding of all risks involved with this procedure and wishes to continue. Appropriate content was obtained from patient and consent is in the patient's chart.

## 2019-10-15 NOTE — CHART NOTE - NSCHARTNOTEFT_GEN_A_CORE
Interventional Neuro- Radiology   Procedure Note PAULO    Procedure: Selective Cerebral Angiography   Pre- Procedure Diagnosis:  Post- Procedure Diagnosis:    : Dr Carlitos Oshea    Physician Assistant: Justyna Garcia PA-C    RN:    Anesthesia: (MAC)   (general anesthesia)    Sheath:    I/Os:  Estimated blood loss less than 10cc  IV fluids:     cc     Urine output     cc        Contrast Omnipaque 240      cc         Antibiotics:    Vitals: BP         HR      Spo2    %      Spo2    %    Preliminary Report:    Using a 4 Fr short/long sheath to the right groin under MAC sedation via   left vertebral artery,  left common carotid artery, left external carotid artey, right vertebral arter,  right common carotid artery, right external carotid artery  a selective cerebral angiography was performed and  demonstrates ________. ( Official note to follow).  Patient tolerated procedure well, hemodynamically stable, no change in neurological status compared to baseline.  Results discussed with neurosurgery, patient and patient's  family.  Groin sheath was removed,  manual compression held to hemostasis  for  21 minutes, no active bleeding, no hematoma, avitene applied,  quick clot and safeguard balloon dressing applied at _____h.  STAT labs:  CBC BMP  ____h.  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     : Dr Carlitos Oshea  Fellow:     Dr Nanette Bhardwaj   Physician Assistant: Justyna Garcia PA-C    Nurse:                     Frances Anaya RN  Radiologic Tech:    Christopher D'Amico LRT  Anesthesiologist:    Dr Hari Arreola   Sheath:                  5 Samoan long sheath     I/Os: EBL less than 10cc  IV fluids: 200 cc  Urine output due to void Contrast Omnipaque 240  21cc          Vitals: /72       HR 76   Spo2 100%    Preliminary Report:  Using a 5 Samoan long sheath to the right groin under MAC sedation via right internal carotid artery, right external carotid artery a selective cerebral angiography was performed and demonstrated complete occlusio of right ophthalmic artery aneurysm. Patient was instructed to discontinue Plavix 75mg. Official note to follow.   Patient tolerated procedure well, hemodynamically stable, no change in neurological status compared to baseline. Results discussed with patient and patient's friend. Right groin sheath was removed, manual compression held to hemostasis for 20 minutes, no active bleeding, no hematoma, quick clot and safeguard balloon dressing applied at 1300 hours.

## 2019-10-28 ENCOUNTER — EMERGENCY (EMERGENCY)
Facility: HOSPITAL | Age: 59
LOS: 1 days | End: 2019-10-28

## 2019-10-28 VITALS
RESPIRATION RATE: 18 BRPM | SYSTOLIC BLOOD PRESSURE: 111 MMHG | OXYGEN SATURATION: 99 % | TEMPERATURE: 99 F | HEART RATE: 60 BPM | DIASTOLIC BLOOD PRESSURE: 65 MMHG

## 2019-10-28 DIAGNOSIS — Z90.49 ACQUIRED ABSENCE OF OTHER SPECIFIED PARTS OF DIGESTIVE TRACT: Chronic | ICD-10-CM

## 2019-10-28 PROBLEM — H93.19 TINNITUS, UNSPECIFIED EAR: Chronic | Status: ACTIVE | Noted: 2019-10-07

## 2019-10-28 PROBLEM — I48.91 UNSPECIFIED ATRIAL FIBRILLATION: Chronic | Status: ACTIVE | Noted: 2019-02-20

## 2019-10-28 PROBLEM — I67.1 CEREBRAL ANEURYSM, NONRUPTURED: Chronic | Status: ACTIVE | Noted: 2019-10-07

## 2019-10-28 PROBLEM — Z95.5 PRESENCE OF CORONARY ANGIOPLASTY IMPLANT AND GRAFT: Chronic | Status: ACTIVE | Noted: 2019-02-20

## 2019-10-31 DIAGNOSIS — I67.1 CEREBRAL ANEURYSM, NONRUPTURED: ICD-10-CM

## 2019-11-06 ENCOUNTER — APPOINTMENT (OUTPATIENT)
Dept: ULTRASOUND IMAGING | Facility: HOSPITAL | Age: 59
End: 2019-11-06
Payer: MEDICAID

## 2019-11-06 ENCOUNTER — OUTPATIENT (OUTPATIENT)
Dept: OUTPATIENT SERVICES | Facility: HOSPITAL | Age: 59
LOS: 1 days | End: 2019-11-06
Payer: MEDICAID

## 2019-11-06 DIAGNOSIS — Z90.49 ACQUIRED ABSENCE OF OTHER SPECIFIED PARTS OF DIGESTIVE TRACT: Chronic | ICD-10-CM

## 2019-11-06 DIAGNOSIS — I67.1 CEREBRAL ANEURYSM, NONRUPTURED: ICD-10-CM

## 2019-11-06 PROCEDURE — 93926 LOWER EXTREMITY STUDY: CPT

## 2019-11-06 PROCEDURE — 93926 LOWER EXTREMITY STUDY: CPT | Mod: 26,RT

## 2020-02-07 NOTE — H&P PST ADULT - ALLERGIC/IMMUNOLOGIC

## 2020-02-18 NOTE — CONSULT NOTE ADULT - SUBJECTIVE AND OBJECTIVE BOX
ID CONSULTATION--Stephon Bentley MD  Pager [phone]    Patient is a 58y old  Female who presents with a chief complaint of right sided numbness x 5 days (22 Feb 2019 12:18)    HPI:  Patient is a 58 year old female  with a past medical history significant for afib ( off coumadin) cad s/p stent x  1,  htn,  presents with right sided  numbness for the past five days.   Patient reports waking up five days prior to admission with numbness of the right side of her tongue , symptoms continued to progress over the next few days and now include  most of the face as well as the right arm, and leg , she has associated right arm and leg weakness.   She reports it feels like " its asleep" no tingling  or pins and needles . She reports no dysarthria and no dysphagia .   on day of admission, patient was evaluated by her cardiologist for routine visit and was subsequently referred to the hospital for further management. Patient reports no fever or chills , no visual changes,  and no headaches. She has no chest pain, shortness of breath, or palpitations. She reports no neck pain or back pain. She reports no recent illness and no sick contacts. No recent travel. (20 Feb 2019 22:54)    No fevers/chills  Originally from [facility]--20 years prior.    CT brain with calcifications.    PAST MEDICAL & SURGICAL HISTORY:  Afib  Stented coronary artery  HTN (hypertension)  S/P cholecystectomy  S/P appendectomy    SOCIAL: no tobacco    FAMILY HISTORY:  Family history of brain cancer (Sibling)  Family history of acute myocardial infarction (Father)    REVIEW OF SYSTEMS  General: Fevers absent  Skin:No rash  Respiratory and Thorax:No cough,sputum or chest pain.Denies shortness of breath	  Cardiovascular:	No chest pain,palpitaions or dizziness  Gastrointestinal:	NO nausea,abdominal pain or diarrhea.  Genitourinary:	No dysuria,frequency. No flank pain  Musculoskeletal:	No joint swelling or pain.No weakness    Allergies  No Known Allergies    ANTIMICROBIALS:  none    Vital Signs Last 24 Hrs  T(C): 36.6 (22 Feb 2019 12:49), Max: 37 (21 Feb 2019 21:58)  T(F): 97.8 (22 Feb 2019 12:49), Max: 98.6 (21 Feb 2019 21:58)  HR: 58 (22 Feb 2019 12:49) (56 - 68)  BP: 110/72 (22 Feb 2019 12:49) (104/69 - 145/67)  BP(mean): --  RR: 18 (22 Feb 2019 12:49) (18 - 18)  SpO2: 99% (22 Feb 2019 12:49) (96% - 99%)    PHYSICAL EXAM:Pleasant patient in no acute distress.  Constitutional:Comfortable.Awake and alert  No cachexia   Eyes:PERRL EOMI.NO discharge or conjunctival injection  ENMT:No sinus tenderness.No thrush.No pharyngeal exudate or erythema.Fair dental hygiene  Neck:Supple,No LN,no JVD  Respiratory:Good air entry bilaterally,CTA  Cardiovascular:S1 S2 wnl, No murmurs,rub or gallops  Gastrointestinal:Soft BS(+) no tenderness no masses ,No rebound or guarding  Extremities:No cyanosis,clubbing or edema.  Neurological:AAO X 3,No grossly focal deficits  Skin:No rash   Lymph Nodes:No palpable LNs  Psychiatric:Affect normal.                        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    Hemosiderin staining along the basalcisterns and cisternal segments of   the cranial nerves, SWI series 5 image 23 crit with any history of   headache or  subarachnoid hemorrhage, in patient with saccular aneurysm.    No hemodynamically significant ICA origin stenosis. Slightly tortuous   internal carotid arteries which may be seen with hypertension with   slightly irregular distal ICA segments, which may be seen with fibrous   dysplasia. Patent dominant left vertebral and smaller right vertebral   arteries.    Intracranially, ventricles sulci are unremarkable in size, there is a   partially empty sella with slight nicky of the optic nerve sheaths, this   can be seen with pseudotumor cerebra. There is no acute hemorrhage,   restricted diffusion, or midline shift.     < end of copied text >      < from: CT Head No Cont (02.20.19 @ 20:28) >    No acute intracranial hemorrhage, mass effect, or other acute   intracranial pathology.    Multiple punctate calcifications in the cerebral parenchyma, which are   nonspecific, and may be secondary to infectious and/or inflammatory   etiologies including neurocysticercosis. MR may better assess.    < end of copied text >    IMPRESSION:  I reviewed brain imaging with neuroradiologist.  No active appearing CNS lesions that are typical of active Neurocystercicosis.  Certainly, the calcifications are compatible with old disease.  Doubt the lesions are cause of symptoms.  No indication for Rx now. ID CONSULTATION--Stephon Bentley MD  Pager [phone]    Patient is a 58y old  Female who presents with a chief complaint of right sided numbness x 5 days (22 Feb 2019 12:18)    HPI:  Patient is a 58 year old female  with a past medical history significant for afib ( off coumadin) cad s/p stent x  1,  htn,  presents with right sided  numbness for the past five days.   Patient reports waking up five days prior to admission with numbness of the right side of her tongue , symptoms continued to progress over the next few days and now include  most of the face as well as the right arm, and leg , she has associated right arm and leg weakness.   She reports it feels like " its asleep" no tingling  or pins and needles . She reports no dysarthria and no dysphagia .   on day of admission, patient was evaluated by her cardiologist for routine visit and was subsequently referred to the hospital for further management. Patient reports no fever or chills , no visual changes,  and no headaches. She has no chest pain, shortness of breath, or palpitations. She reports no neck pain or back pain. She reports no recent illness and no sick contacts. No recent travel. (20 Feb 2019 22:54)    No fevers/chills  Originally from [facility]--20 years prior.    CT brain with calcifications.    PAST MEDICAL & SURGICAL HISTORY:  Afib  Stented coronary artery  HTN (hypertension)  S/P cholecystectomy  S/P appendectomy    SOCIAL: no tobacco    FAMILY HISTORY:  Family history of brain cancer (Sibling)  Family history of acute myocardial infarction (Father)    REVIEW OF SYSTEMS  General: Fevers absent  Skin:No rash  Respiratory and Thorax:No cough,sputum or chest pain.Denies shortness of breath	  Cardiovascular:	No chest pain,palpitaions or dizziness  Gastrointestinal:	NO nausea,abdominal pain or diarrhea.  Genitourinary:	No dysuria,frequency. No flank pain  Musculoskeletal:	No joint swelling or pain.No weakness    Allergies  No Known Allergies    ANTIMICROBIALS:  none    Vital Signs Last 24 Hrs  T(C): 36.6 (22 Feb 2019 12:49), Max: 37 (21 Feb 2019 21:58)  T(F): 97.8 (22 Feb 2019 12:49), Max: 98.6 (21 Feb 2019 21:58)  HR: 58 (22 Feb 2019 12:49) (56 - 68)  BP: 110/72 (22 Feb 2019 12:49) (104/69 - 145/67)  BP(mean): --  RR: 18 (22 Feb 2019 12:49) (18 - 18)  SpO2: 99% (22 Feb 2019 12:49) (96% - 99%)    PHYSICAL EXAM:Pleasant patient in no acute distress.  Constitutional:Comfortable.Awake and alert  No cachexia   Eyes:PERRL EOMI.NO discharge or conjunctival injection  ENMT:No sinus tenderness.No thrush.No pharyngeal exudate or erythema.Fair dental hygiene  Neck:Supple,No LN,no JVD  Respiratory:Good air entry bilaterally,CTA  Cardiovascular:S1 S2 wnl, No murmurs,rub or gallops  Gastrointestinal:Soft BS(+) no tenderness no masses ,No rebound or guarding  Extremities:No cyanosis,clubbing or edema.  Neurological:AAO X 3,No grossly focal deficits  Skin:No rash   Lymph Nodes:No palpable LNs  Psychiatric:Affect normal.                        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    Hemosiderin staining along the basalcisterns and cisternal segments of   the cranial nerves, SWI series 5 image 23 crit with any history of   headache or  subarachnoid hemorrhage, in patient with saccular aneurysm.    No hemodynamically significant ICA origin stenosis. Slightly tortuous   internal carotid arteries which may be seen with hypertension with   slightly irregular distal ICA segments, which may be seen with fibrous   dysplasia. Patent dominant left vertebral and smaller right vertebral   arteries.    Intracranially, ventricles sulci are unremarkable in size, there is a   partially empty sella with slight nicky of the optic nerve sheaths, this   can be seen with pseudotumor cerebra. There is no acute hemorrhage,   restricted diffusion, or midline shift.     < end of copied text >      < from: CT Head No Cont (02.20.19 @ 20:28) >    No acute intracranial hemorrhage, mass effect, or other acute   intracranial pathology.    Multiple punctate calcifications in the cerebral parenchyma, which are   nonspecific, and may be secondary to infectious and/or inflammatory   etiologies including neurocysticercosis. MR may better assess.    < end of copied text >    IMPRESSION:  I reviewed brain imaging with neuroradiologist.  No active appearing CNS lesions that are typical of active Neurocystercicosis.  Certainly, the calcifications are compatible with old-inactive disease.  Doubt the lesions are cause of symptoms.  Additionally, the aneurysm is not typically induced by neurocysticercosis.  No indication for Rx now for cysticercosis.  Can ask optho to assess whether there is any evidence of eye involvement-seems unlikely.  will check serology. Epidermal Closure: epicutaneous horizontal mattress

## 2020-10-22 ENCOUNTER — APPOINTMENT (OUTPATIENT)
Dept: NEUROSURGERY | Facility: CLINIC | Age: 60
End: 2020-10-22

## 2021-05-14 NOTE — ED ADULT NURSE NOTE - NSSISCREENINGQ4_ED_A_ED
Received denial letter from [employer] for [employer]  Denial scanned into media  No

## 2021-08-10 NOTE — ED ADULT NURSE NOTE - NS ED NURSE LEVEL OF CONSCIOUSNESS SPEECH
TRIED TO CALL PT TO SCHEDULE MEDICARE WELLNESS VISIT, PHONE NUMBER DIDN'T WORK.  
Speaking Coherently

## 2021-09-27 NOTE — CONSULT NOTE ADULT - SUBJECTIVE AND OBJECTIVE BOX
Neurology Consult Note    "Patient is a 58 year old female  with a past medical history significant for afib ( off coumadin) cad s/p stent x  1,  htn,  presents with right sided  numbness for the past five days. Patient reports waking up five days prior to admission with numbness of the right side of her tongue , symptoms continued to progress over the next few days and now include  most of the face as well as the right arm, and leg , she has associated right arm and leg weakness.   She reports it feels like " its asleep" no tingling  or pins and needles . She reports no dysarthria and no dysphagia .   on day of admission, patient was evaluated by her cardiologist for routine visit and was subsequently referred to the hospital for further management. Patient reports no fever or chills , no visual changes,  and no headaches. She has no chest pain, shortness of breath, or palpitations. She reports no neck pain or back pain. She reports no recent illness and no sick contacts. No recent travel. "    Home medications  enalapril 20 mg oral tablet: 1 tab(s) orally once a day, Last Dose Taken:  aspirin 81 mg oral tablet: 1 tab(s) orally once a day, Last Dose Taken:  Simvastatin 40 mg oral tablet: 1 tab(s) orally once a day (at bedtime), Last Dose Taken:    raNITIdine 150 mg oral tablet: 1 tab(s) orally 2 times a day, Last Dose Taken:       Past Medical History:  Afib    HTN (hypertension)    Stented coronary artery.     Past Surgical History:  S/P appendectomy    S/P cholecystectomy.     Family History:  Father  Still living? Unknown  Family history of acute myocardial infarction, Age at diagnosis: Age Unknown     Sibling  Still living? Unknown  Family history of brain cancer, Age at diagnosis: Age Unknown    Vital Signs Last 24 Hrs  T(C): 36.8 (21 Feb 2019 15:25), Max: 36.8 (21 Feb 2019 15:25)  T(F): 98.3 (21 Feb 2019 15:25), Max: 98.3 (21 Feb 2019 15:25)  HR: 57 (21 Feb 2019 15:25) (52 - 63)  BP: 115/74 (21 Feb 2019 15:25) (98/60 - 144/80)  BP(mean): --  RR: 18 (21 Feb 2019 15:25) (16 - 18)  SpO2: 96% (21 Feb 2019 15:25) (95% - 99%)    Neurological Exam:  MS - AAOx3, fluetn speech, naming and repetition in tact  CNs- EOMI, PERRL, face symmetric v1-v3 in tact b/l  Motor - 4/5 weakness of the RUE and RLE  Sensory - decreased light touch on the right side  Coordination - FNF in tact b/l    Labs                        11.8   5.33  )-----------( 165      ( 21 Feb 2019 08:21 )             37.1                         11.8   5.33  )-----------( 165      ( 21 Feb 2019 08:21 )             37.1   PT/INR - ( 20 Feb 2019 20:07 )   PT: 11.2 sec;   INR: 0.97 ratio         PTT - ( 20 Feb 2019 20:07 )  PTT:32.1 sec    < from: CT Head No Cont (02.20.19 @ 20:28) >  Prominence of the ventricles and sulci consistent with age-related   cerebral volume loss. Patchy areas of hypoattenuation within the white   matter consistent with mild chronic microvascular changes. There are   multiple punctate calcifications throughout the cerebral parenchyma   suggest collision with prior history of any infectious and/or   inflammatory etiologies, improved assessment the MRI it extends warranted   in patient with right-sided facial numbness. There is no intracranial   hematoma or midline shift. No abnormal extra-axial fluid collections are   present. Basal cisterns appear patent.    The calvarium is intact. The orbits, paranasal sinuses and mastoid   complexes appear free of acute disease.    IMPRESSION:  No acute intracranial hemorrhage, mass effect, or other acute   intracranial pathology.    Multiple punctate calcifications in the cerebral parenchyma, which are   nonspecific, and may be secondary to infectious and/or inflammatory   etiologies including neurocysticercosis. MR may better assess.    < end of copied text > pt seen and examined  Occasional left sided cp not related to movement, lasts 1-2 min  better with food and protonix  will need records from [facility]  cont with heparin  tte in am  statin therapy pt is s/p pci to LAD  TTE reviewed.  She has atypical symptoms, not related to activity  Recheck EKG and also trops in am  cont with AC  We will follow with you.

## 2022-04-27 NOTE — ED PROVIDER NOTE - ATTENDING CONTRIBUTION TO CARE
[facility]    Hospitalist Progress Note    Interval History   - No issues today, having breakfast. Discussed briefly about group homes and she states that she would prefer to stay in the hospital, explained that we are still awaiting for an appropriate one for her.    Assessment & Plan   Summary: Miranda Dover is a 51 year old female with PMH Down Syndrome who was admitted on 1/22/2022 with COVID-19 pneumonia and acute hypoxic respiratory failure. Hospitalization has been prolonged due to need to establish guardianship following deaths of her parents. Awaiting placement.     Down syndrome  Developmental Delay  Failure to thrive  Had significant emotional, psychiatric issues during this stay due to the death of her parents and prolonged hospitalization. Discharge planning has been complicated by need for guardianship and payor source for placement. Patient's brother, Maxi, was granted 60 days of emergency guardianship on 2/15. Completed psychologic testing on 3/2/22 to assess current cognitive function and adaptive abilities. Permanent guardianship on 4/1/2022.  - Continue social work and clinical coordinator for disposition.  Trying to find a group home for patient      Severe malnutrition  Anorexia  Chronic abdominal pain  Chronic constipation  BMI ~13 on early this hospitalization with poor PO intake. SLP was consulted for possible dysphagia, and no evidence of dysphagia was noted. On 2/20, discussed with pt's brother; overall, felt that oral intake was probably acceptable at this point for discharge and did not feel we needed to pursue enteral feedings/g-tube at this point; desired workup for etiology of abdominal pain which he felt was contributing to her decreased PO intake. CT abd/pelvis 2/21 showed large amount of stool. EGD 2/22 relatively unremarkable. US abd no acute pathology. BMI up to 14.7 2/27.  - Encourage PO intake  - PPI  - Bowel regimen     Thyroid nodule  TSH has been  elevated but normal T4 in the past   - Repeat TSH ordered but patient has been refusing blood draws.  This lab is not urgent and can be done as an outpatient     COVID-19 recovered  Initially diagnosed 1/19/2022     E coli UTI, resolved    DVT Prophylaxis: None  Code Status: Full Code  PT/OT: not needed  Diet: Combination Diet Regular Diet; High Kcal/High Protein Diet, ADULT      Disposition: Expected discharge to group home when found    Maximino Deng MD  Text Page  (7am to 6pm)  -Data reviewed today: I reviewed all new labs and imaging results over the last 24 hours.    Physical Exam   Temp: 97.5  F (36.4  C)   BP: 99/56 Pulse: 53   Resp: 18 SpO2: 100 % O2 Device: None (Room air)    Vitals:    02/05/22 0729 02/14/22 1329 04/14/22 1552   Weight: 43.8 kg (96 lb 9.6 oz) 37.7 kg (83 lb 3.2 oz) 40.3 kg (88 lb 14.4 oz)     Vital Signs with Ranges  Temp:  [97.5  F (36.4  C)-97.6  F (36.4  C)] 97.5  F (36.4  C)  Pulse:  [53-61] 53  Resp:  [18] 18  BP: (99)/(56-63) 99/56  SpO2:  [100 %] 100 %  I/O last 3 completed shifts:  In: 120 [P.O.:120]  Out: -   O2 requirements: none    Constitutional: Thin frail appearing female in NAD  HEENT: Eyes nonicteric, Down facies  Respiratory: Breathing comfortably  Vascular: No LE pitting edema  Skin/Integumen: No rashes  Neuro/Psych: Appropriate affect and mood, quiet and pleasant. Moves all extremities    Medications       melatonin  1 mg Oral At Bedtime     multivitamin w/minerals  1 tablet Oral Daily     pantoprazole  40 mg Oral QAM AC     polyethylene glycol  17 g Oral Daily     senna-docusate  1 tablet Oral BID     cholecalciferol  50 mcg Oral Daily       Data   No lab results found in last 7 days.    Imaging:   No results found for this or any previous visit (from the past 24 hour(s)).     l flank / luq abdominal pain/ recent travel [place] / 1 episode bloody diarrhea  luq ttp, +l cvat, no rebound or guarding  concern for renal colic highest, may be colitis/dysentery. less likely volvulus.

## 2022-09-19 NOTE — DISCHARGE NOTE ADULT - CARE PLAN
Notified pt MS Dunbar no longer here. Informed pt Ms Burton would be taking over her care. Will send in 30 day refill. Instructed pt to schedule appt with Ms Burton in next 30 days or she will not be able to send anymore refills   Principal Discharge DX:	Aneurysm of ophthalmic artery  Goal:	s/p placement of 2 pipeline stents to right ophthalmic artery aneurysm  Assessment and plan of treatment:	.  .  Please follow up with neurosurgeon Dr. Carlitos Oshea on 3/6/19 at 1:30pm. An appointment has been made for you. Call [phone] to change or reschedule this appointment. Continue Aspirin 325mg daily and Plavix 75 mg daily for patency of stent.   .  .  NO heavy lifting, strenuous activity, twisting, bending, driving, or working until cleared by your physician.   Return to ER immediately for any of the following: fever, bleeding, new onset numbness/tingling/weakness, nausea and/or vomiting, chest pain, shortness of breath, confusion, seizure, altered mental status, urinary and/or fecal incontinence or retention.  Secondary Diagnosis:	HTN (hypertension)  Assessment and plan of treatment:	.  Please make an appointment for follow up with your primary care physician after discharge.  Secondary Diagnosis:	Nicotine dependence  Assessment and plan of treatment:	.  Please make an appointment for follow up with your primary care physician after discharge.  Secondary Diagnosis:	Afib  Assessment and plan of treatment:	.  Please make an appointment for follow up with your cardiologist after discharge.  Secondary Diagnosis:	Stented coronary artery  Assessment and plan of treatment:	.  Please make an appointment for follow up with your cardiologist after discharge.

## 2022-10-26 ENCOUNTER — NON-APPOINTMENT (OUTPATIENT)
Age: 62
End: 2022-10-26

## 2022-10-27 ENCOUNTER — APPOINTMENT (OUTPATIENT)
Dept: NEUROSURGERY | Facility: CLINIC | Age: 62
End: 2022-10-27

## 2022-10-27 VITALS
SYSTOLIC BLOOD PRESSURE: 136 MMHG | DIASTOLIC BLOOD PRESSURE: 84 MMHG | WEIGHT: 148 LBS | HEART RATE: 61 BPM | HEIGHT: 59 IN | BODY MASS INDEX: 29.84 KG/M2 | OXYGEN SATURATION: 99 %

## 2022-10-27 DIAGNOSIS — I67.1 CEREBRAL ANEURYSM, NONRUPTURED: ICD-10-CM

## 2022-10-27 PROCEDURE — 99212 OFFICE O/P EST SF 10 MIN: CPT

## 2022-10-28 PROBLEM — I67.1 ANEURYSM, CEREBRAL: Status: ACTIVE | Noted: 2019-03-06

## 2022-10-28 NOTE — REASON FOR VISIT
[Follow-Up: _____] : a [unfilled] follow-up visit [FreeTextEntry1] : Justyna Francisco is a 62yr old female here for a follow up visit. To review on 2/26/2019 she underwent cerebral angiography and embolization of the aneurysm with pipeline flow diversion. Today she feels well

## 2022-12-12 ENCOUNTER — OUTPATIENT (OUTPATIENT)
Dept: OUTPATIENT SERVICES | Facility: HOSPITAL | Age: 62
LOS: 1 days | End: 2022-12-12
Payer: MEDICAID

## 2022-12-12 VITALS
SYSTOLIC BLOOD PRESSURE: 128 MMHG | TEMPERATURE: 98 F | OXYGEN SATURATION: 96 % | HEIGHT: 61 IN | RESPIRATION RATE: 18 BRPM | DIASTOLIC BLOOD PRESSURE: 82 MMHG | WEIGHT: 158.07 LBS | HEART RATE: 81 BPM

## 2022-12-12 DIAGNOSIS — I67.1 CEREBRAL ANEURYSM, NONRUPTURED: ICD-10-CM

## 2022-12-12 DIAGNOSIS — Z01.818 ENCOUNTER FOR OTHER PREPROCEDURAL EXAMINATION: ICD-10-CM

## 2022-12-12 DIAGNOSIS — Z90.49 ACQUIRED ABSENCE OF OTHER SPECIFIED PARTS OF DIGESTIVE TRACT: Chronic | ICD-10-CM

## 2022-12-12 DIAGNOSIS — Z95.5 PRESENCE OF CORONARY ANGIOPLASTY IMPLANT AND GRAFT: ICD-10-CM

## 2022-12-12 DIAGNOSIS — Z98.890 OTHER SPECIFIED POSTPROCEDURAL STATES: Chronic | ICD-10-CM

## 2022-12-12 LAB
ANION GAP SERPL CALC-SCNC: 14 MMOL/L — SIGNIFICANT CHANGE UP (ref 5–17)
BLD GP AB SCN SERPL QL: NEGATIVE — SIGNIFICANT CHANGE UP
BUN SERPL-MCNC: 16 MG/DL — SIGNIFICANT CHANGE UP (ref 7–23)
CALCIUM SERPL-MCNC: 9.2 MG/DL — SIGNIFICANT CHANGE UP (ref 8.4–10.5)
CHLORIDE SERPL-SCNC: 103 MMOL/L — SIGNIFICANT CHANGE UP (ref 96–108)
CO2 SERPL-SCNC: 25 MMOL/L — SIGNIFICANT CHANGE UP (ref 22–31)
CREAT SERPL-MCNC: 0.46 MG/DL — LOW (ref 0.5–1.3)
EGFR: 109 ML/MIN/1.73M2 — SIGNIFICANT CHANGE UP
GLUCOSE SERPL-MCNC: 134 MG/DL — HIGH (ref 70–99)
HCT VFR BLD CALC: 37.4 % — SIGNIFICANT CHANGE UP (ref 34.5–45)
HGB BLD-MCNC: 12 G/DL — SIGNIFICANT CHANGE UP (ref 11.5–15.5)
MCHC RBC-ENTMCNC: 28.9 PG — SIGNIFICANT CHANGE UP (ref 27–34)
MCHC RBC-ENTMCNC: 32.1 GM/DL — SIGNIFICANT CHANGE UP (ref 32–36)
MCV RBC AUTO: 90.1 FL — SIGNIFICANT CHANGE UP (ref 80–100)
NRBC # BLD: 0 /100 WBCS — SIGNIFICANT CHANGE UP (ref 0–0)
PA ADP PRP-ACNC: 246 PRU — SIGNIFICANT CHANGE UP (ref 194–417)
PLATELET # BLD AUTO: 232 K/UL — SIGNIFICANT CHANGE UP (ref 150–400)
PLATELET RESPONSE ASPIRIN RESULT: 521 ARU — SIGNIFICANT CHANGE UP (ref 350–700)
POTASSIUM SERPL-MCNC: 3.8 MMOL/L — SIGNIFICANT CHANGE UP (ref 3.5–5.3)
POTASSIUM SERPL-SCNC: 3.8 MMOL/L — SIGNIFICANT CHANGE UP (ref 3.5–5.3)
RBC # BLD: 4.15 M/UL — SIGNIFICANT CHANGE UP (ref 3.8–5.2)
RBC # FLD: 13.2 % — SIGNIFICANT CHANGE UP (ref 10.3–14.5)
RH IG SCN BLD-IMP: POSITIVE — SIGNIFICANT CHANGE UP
SODIUM SERPL-SCNC: 142 MMOL/L — SIGNIFICANT CHANGE UP (ref 135–145)
WBC # BLD: 6.23 K/UL — SIGNIFICANT CHANGE UP (ref 3.8–10.5)
WBC # FLD AUTO: 6.23 K/UL — SIGNIFICANT CHANGE UP (ref 3.8–10.5)

## 2022-12-12 PROCEDURE — 36415 COLL VENOUS BLD VENIPUNCTURE: CPT

## 2022-12-12 PROCEDURE — 85576 BLOOD PLATELET AGGREGATION: CPT

## 2022-12-12 PROCEDURE — 85027 COMPLETE CBC AUTOMATED: CPT

## 2022-12-12 PROCEDURE — 80048 BASIC METABOLIC PNL TOTAL CA: CPT

## 2022-12-12 PROCEDURE — 86901 BLOOD TYPING SEROLOGIC RH(D): CPT

## 2022-12-12 PROCEDURE — G0463: CPT

## 2022-12-12 PROCEDURE — 86900 BLOOD TYPING SEROLOGIC ABO: CPT

## 2022-12-12 PROCEDURE — 86850 RBC ANTIBODY SCREEN: CPT

## 2022-12-12 RX ORDER — RANITIDINE HYDROCHLORIDE 150 MG/1
1 TABLET, FILM COATED ORAL
Qty: 0 | Refills: 0 | DISCHARGE

## 2022-12-12 NOTE — H&P PST ADULT - NSICDXPASTMEDICALHX_GEN_ALL_CORE_FT
PAST MEDICAL HISTORY:  Afib off coumadin    Aneurysm of ophthalmic artery Right, s/p embolization with pipeline flow divertion 02/26/19, Dr Oshea    HTN (hypertension)     Stented coronary artery x1 , 7 years ago , in [facility]    Tinnitus bilateral ear, Tinnitus  with noise

## 2022-12-12 NOTE — H&P PST ADULT - NSICDXPASTSURGICALHX_GEN_ALL_CORE_FT
PAST SURGICAL HISTORY:  S/P appendectomy     S/P cholecystectomy     S/P coil embolization of cerebral aneurysm

## 2022-12-12 NOTE — H&P PST ADULT - ASSESSMENT
ACTIVITY-- pt is active able to tolerate moderate exercise, walking, 1-2 flight of stairs without any cardiac distress  DASI- 7.25 by dasi mets

## 2022-12-12 NOTE — H&P PST ADULT - PROBLEM SELECTOR PLAN 1
Cerebral angiogram on 12/20/22  pre-op instructions discussed   labs sent, P2Y12, Platelets Aspirin Response   covid test 12/17/22  pt will continue asa and plavix  continue HTN meds

## 2022-12-12 NOTE — H&P PST ADULT - HISTORY OF PRESENT ILLNESS
62 year old Mohawk speaking female use  service  with a past medical history of  afib ( no meds) , CAD s/p stent x 1 (2012),  htn, HLD,  right ophthalmic artery aneurysm s/p embolization  with pipeline flow diversion on 02/26/19 (on Asprin 325 mgs & Plavix). Pt with c/o decreased vision, occasional headache follows  by neurology planned follow up cerebral angiogram on 12/20/22.    Covid test 12/17/22

## 2022-12-17 ENCOUNTER — OUTPATIENT (OUTPATIENT)
Dept: OUTPATIENT SERVICES | Facility: HOSPITAL | Age: 62
LOS: 1 days | End: 2022-12-17
Payer: MEDICAID

## 2022-12-17 DIAGNOSIS — Z90.49 ACQUIRED ABSENCE OF OTHER SPECIFIED PARTS OF DIGESTIVE TRACT: Chronic | ICD-10-CM

## 2022-12-17 DIAGNOSIS — Z98.890 OTHER SPECIFIED POSTPROCEDURAL STATES: Chronic | ICD-10-CM

## 2022-12-17 DIAGNOSIS — Z11.52 ENCOUNTER FOR SCREENING FOR COVID-19: ICD-10-CM

## 2022-12-17 LAB — SARS-COV-2 RNA SPEC QL NAA+PROBE: SIGNIFICANT CHANGE UP

## 2022-12-17 PROCEDURE — C9803: CPT

## 2022-12-17 PROCEDURE — U0003: CPT

## 2022-12-17 PROCEDURE — U0005: CPT

## 2022-12-20 ENCOUNTER — OUTPATIENT (OUTPATIENT)
Dept: OUTPATIENT SERVICES | Facility: HOSPITAL | Age: 62
LOS: 1 days | End: 2022-12-20
Payer: MEDICAID

## 2022-12-20 ENCOUNTER — TRANSCRIPTION ENCOUNTER (OUTPATIENT)
Age: 62
End: 2022-12-20

## 2022-12-20 ENCOUNTER — APPOINTMENT (OUTPATIENT)
Dept: NEUROSURGERY | Facility: HOSPITAL | Age: 62
End: 2022-12-20

## 2022-12-20 VITALS
DIASTOLIC BLOOD PRESSURE: 70 MMHG | HEART RATE: 60 BPM | OXYGEN SATURATION: 100 % | SYSTOLIC BLOOD PRESSURE: 118 MMHG | RESPIRATION RATE: 16 BRPM

## 2022-12-20 VITALS
SYSTOLIC BLOOD PRESSURE: 122 MMHG | OXYGEN SATURATION: 99 % | WEIGHT: 145.06 LBS | HEIGHT: 60 IN | RESPIRATION RATE: 16 BRPM | HEART RATE: 57 BPM | DIASTOLIC BLOOD PRESSURE: 84 MMHG | TEMPERATURE: 98 F

## 2022-12-20 DIAGNOSIS — Z09 ENCOUNTER FOR FOLLOW-UP EXAMINATION AFTER COMPLETED TREATMENT FOR CONDITIONS OTHER THAN MALIGNANT NEOPLASM: ICD-10-CM

## 2022-12-20 DIAGNOSIS — Z98.890 OTHER SPECIFIED POSTPROCEDURAL STATES: Chronic | ICD-10-CM

## 2022-12-20 DIAGNOSIS — Z90.49 ACQUIRED ABSENCE OF OTHER SPECIFIED PARTS OF DIGESTIVE TRACT: Chronic | ICD-10-CM

## 2022-12-20 DIAGNOSIS — I67.1 CEREBRAL ANEURYSM, NONRUPTURED: ICD-10-CM

## 2022-12-20 PROCEDURE — C1760: CPT

## 2022-12-20 PROCEDURE — C1887: CPT

## 2022-12-20 PROCEDURE — C1769: CPT

## 2022-12-20 PROCEDURE — 36224 PLACE CATH CAROTD ART: CPT

## 2022-12-20 PROCEDURE — 36224 PLACE CATH CAROTD ART: CPT | Mod: RT

## 2022-12-20 PROCEDURE — C1894: CPT

## 2022-12-20 RX ORDER — AMLODIPINE BESYLATE 2.5 MG/1
1 TABLET ORAL
Qty: 0 | Refills: 0 | DISCHARGE

## 2022-12-20 RX ORDER — SIMVASTATIN 20 MG/1
1 TABLET, FILM COATED ORAL
Qty: 0 | Refills: 0 | DISCHARGE

## 2022-12-20 NOTE — ASU DISCHARGE PLAN (ADULT/PEDIATRIC) - NS MD DC FALL RISK RISK
For information on Fall & Injury Prevention, visit: [email or web address] OR  [email or web address] OR  https://www.cdc.gov/steadi/patient.html

## 2022-12-20 NOTE — CHART NOTE - NSCHARTNOTEFT_GEN_A_CORE
Interventional Neuro Radiology  Pre-Procedure Note    This is a 62 year old Sudanese speaking female with a past medical history of  afib ( no meds) , CAD s/p stent x 1 (2012),  htn, HLD,  right ophthalmic artery aneurysm s/p embolization  with pipeline flow diversion on 02/26/19. Pt with c/o decreased vision, occasional headache follows by neurology.     Neuro Exam: Awake and alert, oriented x3, fluent, normal naming and repetition, follows 3 step commands. Extraocular movements intact, no nystagmus, visual fields full, face symmetric, tongue midline. No drift, 5/5 power x 4 extremities. Normal sensation to LT. Normal finger-to-nose and rapid alternating movements.    PAST MEDICAL & SURGICAL HISTORY:  HTN (hypertension)  Stented coronary artery x1 , 7 years ago , in [facility]  Afib off coumadin  Aneurysm of ophthalmic artery Right, s/p embolization with pipeline flow diversion 02/26/19, Dr Oshea  Tinnitus bilateral ear, Tinnitus  with noise  S/P appendectomy  S/P cholecystectomy    Social History:   former smoker    FAMILY HISTORY:  Family history of acute myocardial infarction (Father)  Family history of brain cancer (Sibling)    Allergies:  No Known Allergies    Current Medications:   · 	aspirin 325 mg oral tablet: Last Dose Taken:  , 1 tab(s) orally once a day  · 	clopidogrel 75 mg oral tablet: Last Dose Taken:  , 1 tab(s) orally once a day  · 	enalapril 20 mg oral tablet: Last Dose Taken:  , 1 tab(s) orally once a day  · 	simvastatin 40 mg oral tablet: Last Dose Taken:  , 1 tab(s) orally once a day (at bedtime)      Labs:   Seen and reviewed.     Assessment/Plan:   This is a 61yo female  presents with right opthalmic artery aneurysm s/p stent embolization. Patient presents to neuro-IR for selective cerebral angiography. Procedure/ risks/ benefits/ goals/ alternatives were explained. Risks include but are not limited to stroke/ vessel injury/ hemorrhage/ groin hematoma. All questions answered. Informed content obtained from patient with Sudanese translation. Consent placed in chart.

## 2022-12-20 NOTE — ASU PATIENT PROFILE, ADULT - FALL HARM RISK - UNIVERSAL INTERVENTIONS
Bed in lowest position, wheels locked, appropriate side rails in place/Call bell, personal items and telephone in reach/Instruct patient to call for assistance before getting out of bed or chair/Non-slip footwear when patient is out of bed/[place] to call system/Physically safe environment - no spills, clutter or unnecessary equipment/Purposeful Proactive Rounding/Room/bathroom lighting operational, light cord in reach

## 2022-12-20 NOTE — ASU DISCHARGE PLAN (ADULT/PEDIATRIC) - NURSING INSTRUCTIONS
Please feel free to contact us at [phone] if any problems arise. After 6PM, Monday through Friday, on weekends and on holidays, please call [phone] and ask for the radiology resident on call to be paged.

## 2022-12-20 NOTE — ASU DISCHARGE PLAN (ADULT/PEDIATRIC) - CARE PROVIDER_API CALL
Carlitos Oshea)  Neurosurgery  [address], [address]  [place] [address]  Phone: [phone]  Fax: [phone]  Follow Up Time:

## 2022-12-20 NOTE — CHART NOTE - NSCHARTNOTEFT_GEN_A_CORE
Interventional Neuro- Radiology   Procedure Note      Procedure: Selective Cerebral Angiography   Pre- Procedure Diagnosis: right opthalmic artery aneurysm s/p stent   Post- Procedure Diagnosis: no residual filling of aneurysm     : Dr. Dayo MD  Fellow:  Dr. Khan   NP: Piyush     RN: Miriam   Tech: Herbert     Anesthesia: Dr. Duval (MAC)      I/Os:  Fluids: 100cc   Patel: DTV   Contrast: 16cc   Estimated Blood Loss: <10cc    Preliminary Report:  Under MAC, using a 5Fr short sheath to the right groin examination of right internal carotid artery via selective cerebral angiography demonstrates no residual filling of right opthalmic artery aneurysm . ( Official note to follow).    Patient tolerated procedure well, vital signs stable, hemodynamically stable, no change in neurological status compared to baseline. Results discussed with neurosurgery/ patient and their family. Groin sheath d/c'ed, manual compression held to hemostasis, no active bleeding, no hematoma, Vascade applied, quick clot and safeguard balloon dressing applied at 1145h. Patient transferred to IR recovery for further care/ monitoring.

## 2022-12-22 NOTE — PROGRESS NOTE ADULT - ASSESSMENT
December 22, 2022       Alina LAGOS Jeremias  [address] [facility] [address]    Dear Ms. Mcdowell,    Your procedure is scheduled with Sloan Hughes MD on January 10, 2023, at [facility]. The start time of your procedure is tentatively scheduled for 10:15 AM. You can expect to be contacted 5 to 7 days prior to the surgery to confirm arrival and surgery time. Occasionally these times may change.    Please arrive to register for your procedure at 8:15 AM. The address of the facility is:      [address] [id number] [place] [id number]  [phone]      The following appointment(s) have been scheduled for you:  Pre-Procedure / Pre-Surgery COVID-19 Testing:  We have you scheduled for your COVID-19 Testing Visit on:  February 7, 2023 at 2 pm  for our ACL Lab site located at [place]  ([address] [place] [id number]).    If you need to reschedule this appointment, please call our office ASAP for assistance.    Please remember the following instructions for after your test and before surgery:  • Self-quarantine is recommended and minimizes your risk of exposure before your procedure. If you are unable to self-quarantine, please continue to wear a mask, practice social distancing and perform good hand hygiene.      • After your COVID-19 test and before your procedure, you should continue to monitor for signs/symptoms of COVID-19 and notify your provider and/or facility contact provided, if you experience any symptoms.         2 week post op with Dr. Sloan Hughes at the [facility] on January 23, 2023 at 11:00 AM             Here are instructions for your surgery:     · If your surgeon has not given you a special skin cleanser with instructions on how to use at home before your surgery, please call that doctor's office.     · Do not eat or drink after midnight the night before your surgery.    · You will need someone to drive you home and remain with you up to 24  hours after you have been discharged.     5 days prior to your appointment DO NOT TAKE ASPIRIN OR ASPIRIN CONTAINING PRODUCTS. This includes products such as [place], Pepto Bismol, Motrin, Ibuprofen and Advil should also be avoided.  (Only Tylenol is aspirin free).     If you take coumadin or other blood thinners contact your primary care physician.     · If you take any diet pill or injection medication for weight loss, this must be stopped 7-10 days prior to your surgery date.     · Schedule your pre op physical with your primary care physician with in 30 days prior to your surgery.           If you have any questions or need to reschedule, please contact me at the telephone number and extension listed below.     Thank you,    Jaylyn  [phone]  Surgery Scheduler for Sloan Hughes MD   [facility] Pre-Admit Department           57 yo female admitted with facial and right sided numbness and weakness and found to have an abnormal CT head

## 2024-08-29 ENCOUNTER — NON-APPOINTMENT (OUTPATIENT)
Age: 64
End: 2024-08-29

## 2024-08-30 ENCOUNTER — APPOINTMENT (OUTPATIENT)
Dept: GYNECOLOGIC ONCOLOGY | Facility: CLINIC | Age: 64
End: 2024-08-30
Payer: MEDICAID

## 2024-08-30 VITALS — BODY MASS INDEX: 25.68 KG/M2 | WEIGHT: 136 LBS | HEIGHT: 61 IN

## 2024-08-30 VITALS
HEART RATE: 54 BPM | TEMPERATURE: 98.2 F | RESPIRATION RATE: 16 BRPM | OXYGEN SATURATION: 98 % | DIASTOLIC BLOOD PRESSURE: 86 MMHG | SYSTOLIC BLOOD PRESSURE: 127 MMHG

## 2024-08-30 DIAGNOSIS — N95.0 POSTMENOPAUSAL BLEEDING: ICD-10-CM

## 2024-08-30 DIAGNOSIS — Z82.49 FAMILY HISTORY OF ISCHEMIC HEART DISEASE AND OTHER DISEASES OF THE CIRCULATORY SYSTEM: ICD-10-CM

## 2024-08-30 DIAGNOSIS — Z80.51 FAMILY HISTORY OF MALIGNANT NEOPLASM OF KIDNEY: ICD-10-CM

## 2024-08-30 DIAGNOSIS — Z87.891 PERSONAL HISTORY OF NICOTINE DEPENDENCE: ICD-10-CM

## 2024-08-30 DIAGNOSIS — Z63.5 DISRUPTION OF FAMILY BY SEPARATION AND DIVORCE: ICD-10-CM

## 2024-08-30 DIAGNOSIS — I25.2 OLD MYOCARDIAL INFARCTION: ICD-10-CM

## 2024-08-30 DIAGNOSIS — Z86.79 PERSONAL HISTORY OF OTHER DISEASES OF THE CIRCULATORY SYSTEM: ICD-10-CM

## 2024-08-30 DIAGNOSIS — R73.03 PREDIABETES.: ICD-10-CM

## 2024-08-30 DIAGNOSIS — E78.00 PURE HYPERCHOLESTEROLEMIA, UNSPECIFIED: ICD-10-CM

## 2024-08-30 DIAGNOSIS — R10.2 PELVIC AND PERINEAL PAIN: ICD-10-CM

## 2024-08-30 DIAGNOSIS — R87.619 UNSPECIFIED ABNORMAL CYTOLOGICAL FINDINGS IN SPECIMENS FROM CERVIX UTERI: ICD-10-CM

## 2024-08-30 DIAGNOSIS — I10 ESSENTIAL (PRIMARY) HYPERTENSION: ICD-10-CM

## 2024-08-30 DIAGNOSIS — Z86.39 PERSONAL HISTORY OF OTHER ENDOCRINE, NUTRITIONAL AND METABOLIC DISEASE: ICD-10-CM

## 2024-08-30 DIAGNOSIS — Z80.9 FAMILY HISTORY OF MALIGNANT NEOPLASM, UNSPECIFIED: ICD-10-CM

## 2024-08-30 DIAGNOSIS — Z80.8 FAMILY HISTORY OF MALIGNANT NEOPLASM OF OTHER ORGANS OR SYSTEMS: ICD-10-CM

## 2024-08-30 PROCEDURE — 57454 BX/CURETT OF CERVIX W/SCOPE: CPT

## 2024-08-30 PROCEDURE — 99205 OFFICE O/P NEW HI 60 MIN: CPT | Mod: 25

## 2024-08-30 PROCEDURE — 99459 PELVIC EXAMINATION: CPT

## 2024-08-30 RX ORDER — SIMVASTATIN 20 MG/1
20 TABLET, FILM COATED ORAL
Refills: 0 | Status: ACTIVE | COMMUNITY

## 2024-08-30 SDOH — SOCIAL STABILITY - SOCIAL INSECURITY: DISRUPTION OF FAMILY BY SEPARATION AND DIVORCE: Z63.5

## 2024-08-30 NOTE — PROCEDURE
[Endometrial Biopsy] : an endometrial biopsy [Other ___] : [unfilled] [Cervical Biopsy] : a cervical biopsy [Postmenopausal Bleeding] : postmenopausal bleeding [Abnormal Pap Smear] : an abnormal pap smear [Patient] : the patient [Written consent] : written consent was obtained prior to the procedure and is detailed in the patient's record [Yes] : the specimen was sent to pathology [No Complications] : none [Tolerated Well] : the patient tolerated the procedure well [FreeTextEntry1] : All the risks, benefits and alternatives to the procedure were extensively discussed with the patient and written consent was obtained. A timeout protocol was performed prior to initiating the procedure.   Bimanual examination was done to determine the position of the uterus.   Vaginal speculum was inserted, and the cervix and vagina were visualized and inadequate colposcopy due to inability to visualize entire transformation zone.   The cervix and vagina were cleansed with 5% acetic acid and allowed to bathe in acetic acid for at least 1 minute.   A mild acetowhite lesion was noted at both the 9 and 12 o'clock positions with no punctuations or vascularity. Biopsy's from both sites were taken with the KevZingayaian biopsy forceps. Next endocervical curettage was gently performed. Then cervix was cleansed with antiseptic solution.  A single tooth tenaculum was placed along the anterior lip of the cervix. The device was inserted through the cervical canal, into the uterine cavity, and up to the fundus.   Silver nitrate was applied to the biopsy sites and excellent hemostasis noted.       Follow up: The patient tolerated the procedure well without complications.  Standard post-procedure care is explained and return precautions are given.       Specimen sent to pathology for analysis.

## 2024-08-30 NOTE — DISCUSSION/SUMMARY
[Reviewed Clinical Lab Test(s)] : Results of clinical tests were reviewed. [Reviewed Radiology Report(s)] : Radiology reports were reviewed. [Discuss Tests w/Referring Providers] : Results of labs/radiology studies and the treatment recommendations were discussed with performing/referring physician. [Discuss Alternatives/Risks/Benefits w/Patient] : All alternatives, risks, and benefits were discussed with the patient/family and all questions were answered.  Patient expressed good understanding and appreciates the importance of follow up as recommended. [Visit Time ___ Minutes] : [unfilled] minutes [Face to Face Time___ Minutes] : with [unfilled] minutes in face to face consultation. [FreeTextEntry1] : - Patients history and work up to date reviewed in detail. - Discussed in detail the diagnosis and risk of progression and/or concurrent invasive disease of HSIL. Patient counseled that most infections detected over the years of screening are reactivations of latent infections that are acquired at or near sexual debut. Reactivation of a latent infection could imply waning immunity, and the patient might be at increased risk of persistence. Discussed this is particularly common in immunocompromised individuals albeit she doesnt currently have any risk factors for immunocompromise.  -Also discussed persistent HPV infection (defined as consecutively positive HPV results at least 12 months apart) is a necessary pathogenetic step for progression to clinically relevant disease. Most, if not all, patients with persistent HPV infection will be diagnosed with cervical intraepithelial neoplasia 2 or more (BECCA 2+) within five to seven years, many in as few as two years.  -After this extensive counseling shared decision made to proceed with colposcopy and biopsys. We also discussed rationale for EMB given her episode of vaginal spotting (PMB). Patient tolerated the procedures well and standard post procedure precautions provided. - Plan for TVUS given her pelvic discomfort. - For close interval follow up in 1 month to review work up to date and plan.  - I spent the time noted on the day of this patient encounter preparing for, providing and documenting the above service. I have counseled and educated the patient on the differential, workup, disease course, and treatment/management plan. Education was provided to the patient during this encounter. All questions and concerns were answered and addressed in detail.

## 2024-08-30 NOTE — HISTORY OF PRESENT ILLNESS
[FreeTextEntry1] : GYN/Ref:  Dr. Jess Otero Cards:  Dr. Deon Philip Neuro:  Dr. Carlitos Oshea  PCP:   Dr. Catarino Barrientos  Ms. Francisco, 63 years old, referred for abnormal Pap smears.  GYN note indicates patient has been having abnormal Pap smears since  (+ HPV mRNA).  She has a history of cryosurgery back in  and was advised to follow-up with GYN Onc as a result.  Denies f/c/n/v/d/vaginal bleeding, changes to bowel or bladder habits.  Denies unintentional weight loss or gain.  She does have new onset constipation and lower pelvic pain.  Denies any other associated symptoms. She does note that she has an episode of vaginal spotting a month or so ago.   Pap: 24: Unable to provide interpretation due to unsatisfactory specimen adequacy.  + HPV mRNA; - HPV 16/18/45.  ([facility]) 2024: Negative for intraepithelial lesion or malignancy; + HPV mRNA; - HPV 16/18/45 ([facility]) 2022: Negative for intraepithelial lesion or malignancy; + HPV mRNA; - HPV 16/18/45 ([facility]) 2022: Unable to provide interpretation due to unsatisfactory specimen adequacy.  + HPV mRNA; - HPV 16/18/45 ([facility])  Labs: 22:  Inhibin B = < 10;  CEA = <0.5; AFP = 1.8;   = 8;   = 5  Imagin22 TVUS ([facility]) Uterus: 6.1 x 2.7 x 4.3 cm Endometrium: 1.7 mm Right ovary: 1.2 x 1.9 x 1.0 cm Left ovary: 1.4 x 1.9 x 1.0 cm Cul-de-sac: Normal.  No free fluid Impression: The uterus is in anteverted position.  No evidence of myomas.  The endometrium is visualized.  The cervix has a normal appearance.  No free fluid was visualized in the cul-de-sac.  The right ovary was visualized and has a normal appearance.  The left ovary was visualized and has normal appearance.  No adnexal masses visualized.  POB: G6, P5 (1 , 4 vaginal deliveries, 1 miscarriage.  Children are 42, 39, 36, 34, 27). PGYN: Menarche age 16, LMP age 56 PMH:  Prediabetes, HLD, HTN Meds: Enalapril, simvastatin, 81 mg aspirin. Allergies: NKDA PSH:  , laparoscopic cholecystectomy and laparoscopic appendectomy  ([facility]),   cardiac stent  ([facility]); 2019 - cerebral angiography and embolization of aneurysm ([facility]); abdominoplasty and breast reduction 3 months ago in [facility].   Social Hx: Lives with her daughter, non-smoker, no alcohol, no drugs FH: Brother-kidney cancer-age 69; another brother-brain cancer-age 45  Health Maintenance: Mammogram: 3/2023 - BIRADS 1 ([facility]) Colonoscopy:  (due ) PAP: See above.

## 2024-08-30 NOTE — PHYSICAL EXAM
[Chaperone Present] : A chaperone was present in the examining room during all aspects of the physical examination [[id number]] : A chaperone was present during the pelvic exam. [Normal] : Anus and perineum: Normal sphincter tone, no masses, no prolapse. [Fully active, able to carry on all pre-disease performance without restriction] : Status 0 - Fully active, able to carry on all pre-disease performance without restriction [FreeTextEntry2] : Markus SPENCER [de-identified] : See colposcopy, cervical distortion c/w some sort of conization procedure in the past

## 2024-08-30 NOTE — PHYSICAL EXAM
[Chaperone Present] : A chaperone was present in the examining room during all aspects of the physical examination [[id number]] : A chaperone was present during the pelvic exam. [Normal] : Anus and perineum: Normal sphincter tone, no masses, no prolapse. [Fully active, able to carry on all pre-disease performance without restriction] : Status 0 - Fully active, able to carry on all pre-disease performance without restriction [FreeTextEntry2] : Markus SPENCER [de-identified] : See colposcopy, cervical distortion c/w some sort of conization procedure in the past

## 2024-08-30 NOTE — HISTORY OF PRESENT ILLNESS
[FreeTextEntry1] : GYN/Ref:  Dr. Jess Otero Cards:  Dr. Deon Philip Neuro:  Dr. Carlitos Oshea  PCP:   Dr. Catarino Barrientos  Ms. Francisco, 63 years old, referred for abnormal Pap smears.  GYN note indicates patient has been having abnormal Pap smears since  (+ HPV mRNA).  She has a history of cryosurgery back in  and was advised to follow-up with GYN Onc as a result.  Denies f/c/n/v/d/vaginal bleeding, changes to bowel or bladder habits.  Denies unintentional weight loss or gain.  She does have new onset constipation and lower pelvic pain.  Denies any other associated symptoms. She does note that she has an episode of vaginal spotting a month or so ago.   Pap: 24: Unable to provide interpretation due to unsatisfactory specimen adequacy.  + HPV mRNA; - HPV 16/18/45.  ([facility]) 2024: Negative for intraepithelial lesion or malignancy; + HPV mRNA; - HPV 16/18/45 ([facility]) 2022: Negative for intraepithelial lesion or malignancy; + HPV mRNA; - HPV 16/18/45 ([facility]) 2022: Unable to provide interpretation due to unsatisfactory specimen adequacy.  + HPV mRNA; - HPV 16/18/45 ([facility])  Labs: 22:  Inhibin B = < 10;  CEA = <0.5; AFP = 1.8;   = 8;   = 5  Imagin22 TVUS ([facility]) Uterus: 6.1 x 2.7 x 4.3 cm Endometrium: 1.7 mm Right ovary: 1.2 x 1.9 x 1.0 cm Left ovary: 1.4 x 1.9 x 1.0 cm Cul-de-sac: Normal.  No free fluid Impression: The uterus is in anteverted position.  No evidence of myomas.  The endometrium is visualized.  The cervix has a normal appearance.  No free fluid was visualized in the cul-de-sac.  The right ovary was visualized and has a normal appearance.  The left ovary was visualized and has normal appearance.  No adnexal masses visualized.  POB: G6, P5 (1 , 4 vaginal deliveries, 1 miscarriage.  Children are 42, 39, 36, 34, 27). PGYN: Menarche age 16, LMP age 56 PMH:  Prediabetes, HLD, HTN Meds: Enalapril, simvastatin, 81 mg aspirin. Allergies: NKDA PSH:  , laparoscopic cholecystectomy and laparoscopic appendectomy  ([facility]),   cardiac stent  ([facility]); 2019 - cerebral angiography and embolization of aneurysm ([facility]); abdominoplasty and breast reduction 3 months ago in [facility].   Social Hx: Lives with her daughter, non-smoker, no alcohol, no drugs FH: Brother-kidney cancer-age 69; another brother-brain cancer-age 45  Health Maintenance: Mammogram: 3/2023 - BIRADS 1 ([facility]) Colonoscopy:  (due ) PAP: See above.

## 2024-08-30 NOTE — LETTER BODY
[Dear  ___] : Dear  [unfilled], [I had the pleasure of evaluating your patient, [unfilled] for ___] : I had the pleasure of evaluating your patient, [unfilled] for [unfilled]. [Attached please find my note.] : Attached please find my note. [FreeTextEntry1] : biopsy results

## 2024-08-30 NOTE — PROCEDURE
[Endometrial Biopsy] : an endometrial biopsy [Other ___] : [unfilled] [Cervical Biopsy] : a cervical biopsy [Postmenopausal Bleeding] : postmenopausal bleeding [Abnormal Pap Smear] : an abnormal pap smear [Patient] : the patient [Written consent] : written consent was obtained prior to the procedure and is detailed in the patient's record [Yes] : the specimen was sent to pathology [No Complications] : none [Tolerated Well] : the patient tolerated the procedure well [FreeTextEntry1] : All the risks, benefits and alternatives to the procedure were extensively discussed with the patient and written consent was obtained. A timeout protocol was performed prior to initiating the procedure.   Bimanual examination was done to determine the position of the uterus.   Vaginal speculum was inserted, and the cervix and vagina were visualized and inadequate colposcopy due to inability to visualize entire transformation zone.   The cervix and vagina were cleansed with 5% acetic acid and allowed to bathe in acetic acid for at least 1 minute.   A mild acetowhite lesion was noted at both the 9 and 12 o'clock positions with no punctuations or vascularity. Biopsy's from both sites were taken with the [employer] biopsy forceps. Next endocervical curettage was gently performed. Then cervix was cleansed with antiseptic solution.  A single tooth tenaculum was placed along the anterior lip of the cervix. The device was inserted through the cervical canal, into the uterine cavity, and up to the fundus.   Silver nitrate was applied to the biopsy sites and excellent hemostasis noted.       Follow up: The patient tolerated the procedure well without complications.  Standard post-procedure care is explained and return precautions are given.       Specimen sent to pathology for analysis.

## 2024-08-30 NOTE — ASSESSMENT
[FreeTextEntry1] : 62 yo with ?history of cervical dysplasia s/p cryo or cone remotely and now with persistent abnormal cervical paps and no colposcopic evaluation here for initial consultation.

## 2024-08-30 NOTE — OB HISTORY
[Total Preg ___] : : [unfilled] [Full Term ___] : [unfilled] (full-term) [Living ___] : [unfilled] (living) [Vaginal ___] : [unfilled] vaginal delivery(s) [ ___] : [unfilled]  section delivery(s) [AB Spont ___] : [unfilled] miscarriage(s) [Menarche Age ____] : age at menarche was [unfilled] [Menopause  Age ____] : menopause occurred at age [unfilled]

## 2024-08-30 NOTE — ASSESSMENT
[FreeTextEntry1] : 64 yo with ?history of cervical dysplasia s/p cryo or cone remotely and now with persistent abnormal cervical paps and no colposcopic evaluation here for initial consultation.

## 2024-09-05 LAB — CORE LAB BIOPSY: NORMAL

## 2024-09-09 ENCOUNTER — APPOINTMENT (OUTPATIENT)
Dept: ULTRASOUND IMAGING | Facility: CLINIC | Age: 64
End: 2024-09-09
Payer: MEDICAID

## 2024-09-09 PROCEDURE — 76856 US EXAM PELVIC COMPLETE: CPT

## 2024-09-09 PROCEDURE — 76830 TRANSVAGINAL US NON-OB: CPT

## 2024-09-27 ENCOUNTER — APPOINTMENT (OUTPATIENT)
Dept: GYNECOLOGIC ONCOLOGY | Facility: CLINIC | Age: 64
End: 2024-09-27
Payer: MEDICAID

## 2024-09-27 VITALS
HEIGHT: 61 IN | OXYGEN SATURATION: 98 % | BODY MASS INDEX: 25.68 KG/M2 | WEIGHT: 136 LBS | RESPIRATION RATE: 16 BRPM | SYSTOLIC BLOOD PRESSURE: 128 MMHG | HEART RATE: 64 BPM | DIASTOLIC BLOOD PRESSURE: 86 MMHG

## 2024-09-27 DIAGNOSIS — R87.619 UNSPECIFIED ABNORMAL CYTOLOGICAL FINDINGS IN SPECIMENS FROM CERVIX UTERI: ICD-10-CM

## 2024-09-27 DIAGNOSIS — N95.0 POSTMENOPAUSAL BLEEDING: ICD-10-CM

## 2024-09-27 PROCEDURE — 99213 OFFICE O/P EST LOW 20 MIN: CPT

## 2024-09-27 PROCEDURE — 99459 PELVIC EXAMINATION: CPT

## 2024-09-27 NOTE — PROCEDURE
[Endometrial Biopsy] : an endometrial biopsy [Other ___] : [unfilled] [Cervical Biopsy] : a cervical biopsy [Postmenopausal Bleeding] : postmenopausal bleeding [Abnormal Pap Smear] : an abnormal pap smear [Patient] : the patient [Written consent] : written consent was obtained prior to the procedure and is detailed in the patient's record [Yes] : the specimen was sent to pathology [No Complications] : none [Tolerated Well] : the patient tolerated the procedure well [FreeTextEntry1] : All the risks, benefits and alternatives to the procedure were extensively discussed with the patient and written consent was obtained. A timeout protocol was performed prior to initiating the procedure.   Bimanual examination was done to determine the position of the uterus.   Vaginal speculum was inserted, and the cervix and vagina were visualized and inadequate colposcopy due to inability to visualize entire transformation zone.   The cervix and vagina were cleansed with 5% acetic acid and allowed to bathe in acetic acid for at least 1 minute.   A mild acetowhite lesion was noted at both the 9 and 12 o'clock positions with no punctuations or vascularity. Biopsy's from both sites were taken with the [employer] biopsy forceps. Next endocervical curettage was gently performed. Then cervix was cleansed with antiseptic solution.  A single tooth tenaculum was placed along the anterior lip of the cervix. The device was inserted through the cervical canal, into the uterine cavity, and up to the fundus.   Silver nitrate was applied to the biopsy sites and excellent hemostasis noted.       Follow up: The patient tolerated the procedure well without complications.  Standard post-procedure care is explained and return precautions are given.       Specimen sent to pathology for analysis.

## 2024-09-27 NOTE — PROCEDURE
[Endometrial Biopsy] : an endometrial biopsy [Other ___] : [unfilled] [Cervical Biopsy] : a cervical biopsy [Postmenopausal Bleeding] : postmenopausal bleeding [Abnormal Pap Smear] : an abnormal pap smear [Patient] : the patient [Written consent] : written consent was obtained prior to the procedure and is detailed in the patient's record [Yes] : the specimen was sent to pathology [No Complications] : none [Tolerated Well] : the patient tolerated the procedure well [FreeTextEntry1] : All the risks, benefits and alternatives to the procedure were extensively discussed with the patient and written consent was obtained. A timeout protocol was performed prior to initiating the procedure.   Bimanual examination was done to determine the position of the uterus.   Vaginal speculum was inserted, and the cervix and vagina were visualized and inadequate colposcopy due to inability to visualize entire transformation zone.   The cervix and vagina were cleansed with 5% acetic acid and allowed to bathe in acetic acid for at least 1 minute.   A mild acetowhite lesion was noted at both the 9 and 12 o'clock positions with no punctuations or vascularity. Biopsy's from both sites were taken with the [employer] biopsy forceps. Next endocervical curettage was gently performed. Then cervix was cleansed with antiseptic solution.  A single tooth tenaculum was placed along the anterior lip of the cervix. The device was inserted through the cervical canal, into the uterine cavity, and up to the fundus.   Silver nitrate was applied to the biopsy sites and excellent hemostasis noted.       Follow up: The patient tolerated the procedure well without complications.  Standard post-procedure care is explained and return precautions are given.       Specimen sent to pathology for analysis.

## 2024-09-27 NOTE — PAST MEDICAL HISTORY
Insurance does not cover Carlos. [Postmenopausal] : The patient is postmenopausal [Menarche Age ____] : age at menarche was [unfilled] [Menopause Age____] : age at menopause was [unfilled] [Total Preg ___] : G[unfilled] [Live Births ___] : P[unfilled]  [Full Term ___] : Full Term: [unfilled] [Living ___] : Living: [unfilled] [AB Spont ___] : miscarriages: [unfilled]

## 2024-09-28 NOTE — PHYSICAL EXAM
[Chaperone Present] : A chaperone was present in the examining room during all aspects of the physical examination [Normal] : Anus and perineum: Normal sphincter tone, no masses, no prolapse. [Fully active, able to carry on all pre-disease performance without restriction] : Status 0 - Fully active, able to carry on all pre-disease performance without restriction [11432] : A chaperone was present during the pelvic exam. [FreeTextEntry2] : Markus SPENCER [de-identified] :  cervical distortion c/w some sort of conization procedure in the past

## 2024-09-28 NOTE — DISCUSSION/SUMMARY
[Reviewed Clinical Lab Test(s)] : Results of clinical tests were reviewed. [Reviewed Radiology Report(s)] : Radiology reports were reviewed. [Discuss Tests w/Referring Providers] : Results of labs/radiology studies and the treatment recommendations were discussed with performing/referring physician. [Discuss Alternatives/Risks/Benefits w/Patient] : All alternatives, risks, and benefits were discussed with the patient/family and all questions were answered.  Patient expressed good understanding and appreciates the importance of follow up as recommended. [Visit Time ___ Minutes] : [unfilled] minutes [Face to Face Time___ Minutes] : with [unfilled] minutes in face to face consultation. [FreeTextEntry1] : - Patients history and work up to date reviewed in detail. - Discussed in detail the diagnosis and risk of progression and/or concurrent invasive disease of HSIL. Patient counseled that most infections detected over the years of screening are reactivations of latent infections that are acquired at or near sexual debut. Reactivation of a latent infection could imply waning immunity, and the patient might be at increased risk of persistence. Discussed this is particularly common in immunocompromised individuals albeit she doesnt currently have any risk factors for immunocompromise.  -Also discussed persistent HPV infection (defined as consecutively positive HPV results at least 12 months apart) is a necessary pathogenetic step for progression to clinically relevant disease. Most, if not all, patients with persistent HPV infection will be diagnosed with cervical intraepithelial neoplasia 2 or more (BECCA 2+) within five to seven years, many in as few as two years.  -She presents today to review her biopsy results from her initial visit and management plan going forward. We reviewed the limitations of her inadequate EMB but that her recent TVUS demonstrated normal EE. We reviewed her benign cervical biopsys. We reviewed her recent normal sono. She is without complaint today. She denies fevers, chills, night sweats, chest pain, SOB, changes in appetite, nausea, vomiting, increased abdominal girth, unintentional weight loss, abdominopelvic pain, lower extremity edema or pain and changes in bowel/bladder movements. Denies vaginal bleeding and abnormal vaginal discharge.  -Plan for follow up in 1 year or sooner as clinically indicated.   - I spent the time noted on the day of this patient encounter preparing for, providing and documenting the above service. I have counseled and educated the patient on the differential, workup, disease course, and treatment/management plan. Education was provided to the patient during this encounter. All questions and concerns were answered and addressed in detail.

## 2024-09-28 NOTE — HISTORY OF PRESENT ILLNESS
[FreeTextEntry1] : GYN/Ref:  Dr. Jess Otero Cards:  Dr. Deon Philip Neuro:  Dr. Carlitos Oshea  PCP:   Dr. Catarino Barrientos  Ms. Francisco, 63 years old, referred for abnormal Pap smears.  GYN note indicates patient has been having abnormal Pap smears since  (+ HPV mRNA).  She has a history of cryosurgery back in  and was advised to follow-up with GYN Onc as a result.  She presents today to review her biopsy results from her initial visit and management plan going forward. We reviewed the limitations of her inadequate EMB but that her recent TVUS demonstrated normal EE. We reviewed her benign cervical biopsys. We reviewed her recent normal sono. She is without complaint today. She denies fevers, chills, night sweats, chest pain, SOB, changes in appetite, nausea, vomiting, increased abdominal girth, unintentional weight loss, abdominopelvic pain, lower extremity edema or pain and changes in bowel/bladder movements. Denies vaginal bleeding and abnormal vaginal discharge.     Pathology: 2024 colposcopy ([facility]) 1.  Endometrium (biopsy):      -   No endometrial glands are identified; inadequate for evaluation.      -   Scant fragments of benign endocervical glands. 2.  Cervix, 9:00 (biopsy):      -   Detached fragments of benign superficial squamous mucosa.      -   No transformation zone is identified. 3.  Cervix, 12:00 (biopsy):      -   Detached fragments of benign superficial squamous mucosa.      -   No transformation zone is identified. 4.  Endocervix (curettage):      -   Rare fragments of benign endocervical cells; scant sample.  Pap: 24: Unable to provide interpretation due to unsatisfactory specimen adequacy.  + HPV mRNA; - HPV 16/18/45.  ([facility]) 2024: Negative for intraepithelial lesion or malignancy; + HPV mRNA; - HPV 16/18/45 ([facility]) 2022: Negative for intraepithelial lesion or malignancy; + HPV mRNA; - HPV 16/18/45 ([facility]) 2022: Unable to provide interpretation due to unsatisfactory specimen adequacy.  + HPV mRNA; - HPV 16/18/45 ([facility])  Labs: 22:  Inhibin B = < 10;  CEA = <0.5; AFP = 1.8;   = 8;   = 5  Imagin2024 TVUS ([facility]) Uterus: 5.5 cm x 3.1 cm x 3.9 cm. Within normal limits. Endometrium: 5 mm. Within normal limits. Trace fluid in the cavity. Right ovary: 2.0 cm x 1.1 cm x 0.9 cm. Within normal limits. Left ovary: 1.8 cm x 0.9 cm x 0.9 cm. Within normal limits. Bilateral tubal occlusion devices are present. Fluid: None. IMPRESSION: Normal pelvic sonogram.  22 TVUS ([facility]) Uterus: 6.1 x 2.7 x 4.3 cm Endometrium: 1.7 mm Right ovary: 1.2 x 1.9 x 1.0 cm Left ovary: 1.4 x 1.9 x 1.0 cm Cul-de-sac: Normal.  No free fluid Impression: The uterus is in anteverted position.  No evidence of myomas.  The endometrium is visualized.  The cervix has a normal appearance.  No free fluid was visualized in the cul-de-sac.  The right ovary was visualized and has a normal appearance.  The left ovary was visualized and has normal appearance.  No adnexal masses visualized.  POB: G6, P5 (1 , 4 vaginal deliveries, 1 miscarriage.  Children are 42, 39, 36, 34, 27). PGYN: Menarche age 16, LMP age 56 PMH:  Prediabetes, HLD, HTN Meds: Enalapril, simvastatin, 81 mg aspirin. Allergies: NKDA PSH:  , laparoscopic cholecystectomy and laparoscopic appendectomy  ([facility]),   cardiac stent  ([facility]); 2019 - cerebral angiography and embolization of aneurysm ([facility]); abdominoplasty and breast reduction 3 months ago in [facility].   Social Hx: Lives with her daughter, non-smoker, no alcohol, no drugs FH: Brother-kidney cancer-age 69; another brother-brain cancer-age 45  Health Maintenance: Mammogram: 2024 BI-RADS 2 ([facility]) Colonoscopy:  (due ) PAP: See above.

## 2024-09-28 NOTE — ASSESSMENT
[FreeTextEntry1] : 64 yo with ?history of cervical dysplasia s/p cryo or cone remotely and now with persistent abnormal cervical paps and no colposcopic evaluation here for follow up consultation.

## 2024-09-28 NOTE — HISTORY OF PRESENT ILLNESS
[FreeTextEntry1] : GYN/Ref:  Dr. Jess Otero Cards:  Dr. Deon Philip Neuro:  Dr. Carlitos Oshea  PCP:   Dr. Catarino Barrientos  Ms. Francisco, 63 years old, referred for abnormal Pap smears.  GYN note indicates patient has been having abnormal Pap smears since  (+ HPV mRNA).  She has a history of cryosurgery back in  and was advised to follow-up with GYN Onc as a result.  She presents today to review her biopsy results from her initial visit and management plan going forward. We reviewed the limitations of her inadequate EMB but that her recent TVUS demonstrated normal EE. We reviewed her benign cervical biopsys. We reviewed her recent normal sono. She is without complaint today. She denies fevers, chills, night sweats, chest pain, SOB, changes in appetite, nausea, vomiting, increased abdominal girth, unintentional weight loss, abdominopelvic pain, lower extremity edema or pain and changes in bowel/bladder movements. Denies vaginal bleeding and abnormal vaginal discharge.     Pathology: 2024 colposcopy ([facility]) 1.  Endometrium (biopsy):      -   No endometrial glands are identified; inadequate for evaluation.      -   Scant fragments of benign endocervical glands. 2.  Cervix, 9:00 (biopsy):      -   Detached fragments of benign superficial squamous mucosa.      -   No transformation zone is identified. 3.  Cervix, 12:00 (biopsy):      -   Detached fragments of benign superficial squamous mucosa.      -   No transformation zone is identified. 4.  Endocervix (curettage):      -   Rare fragments of benign endocervical cells; scant sample.  Pap: 24: Unable to provide interpretation due to unsatisfactory specimen adequacy.  + HPV mRNA; - HPV 16/18/45.  ([facility]) 2024: Negative for intraepithelial lesion or malignancy; + HPV mRNA; - HPV 16/18/45 ([facility]) 2022: Negative for intraepithelial lesion or malignancy; + HPV mRNA; - HPV 16/18/45 ([facility]) 2022: Unable to provide interpretation due to unsatisfactory specimen adequacy.  + HPV mRNA; - HPV 16/18/45 ([facility])  Labs: 22:  Inhibin B = < 10;  CEA = <0.5; AFP = 1.8;   = 8;   = 5  Imagin2024 TVUS ([facility]) Uterus: 5.5 cm x 3.1 cm x 3.9 cm. Within normal limits. Endometrium: 5 mm. Within normal limits. Trace fluid in the cavity. Right ovary: 2.0 cm x 1.1 cm x 0.9 cm. Within normal limits. Left ovary: 1.8 cm x 0.9 cm x 0.9 cm. Within normal limits. Bilateral tubal occlusion devices are present. Fluid: None. IMPRESSION: Normal pelvic sonogram.  22 TVUS ([facility]) Uterus: 6.1 x 2.7 x 4.3 cm Endometrium: 1.7 mm Right ovary: 1.2 x 1.9 x 1.0 cm Left ovary: 1.4 x 1.9 x 1.0 cm Cul-de-sac: Normal.  No free fluid Impression: The uterus is in anteverted position.  No evidence of myomas.  The endometrium is visualized.  The cervix has a normal appearance.  No free fluid was visualized in the cul-de-sac.  The right ovary was visualized and has a normal appearance.  The left ovary was visualized and has normal appearance.  No adnexal masses visualized.  POB: G6, P5 (1 , 4 vaginal deliveries, 1 miscarriage.  Children are 42, 39, 36, 34, 27). PGYN: Menarche age 16, LMP age 56 PMH:  Prediabetes, HLD, HTN Meds: Enalapril, simvastatin, 81 mg aspirin. Allergies: NKDA PSH:  , laparoscopic cholecystectomy and laparoscopic appendectomy  ([facility]),   cardiac stent  ([facility]); 2019 - cerebral angiography and embolization of aneurysm ([facility]); abdominoplasty and breast reduction 3 months ago in [facility].   Social Hx: Lives with her daughter, non-smoker, no alcohol, no drugs FH: Brother-kidney cancer-age 69; another brother-brain cancer-age 45  Health Maintenance: Mammogram: 2024 BI-RADS 2 ([facility]) Colonoscopy:  (due ) PAP: See above.

## 2024-09-28 NOTE — PHYSICAL EXAM
[Chaperone Present] : A chaperone was present in the examining room during all aspects of the physical examination [Normal] : Anus and perineum: Normal sphincter tone, no masses, no prolapse. [Fully active, able to carry on all pre-disease performance without restriction] : Status 0 - Fully active, able to carry on all pre-disease performance without restriction [98099] : A chaperone was present during the pelvic exam. [FreeTextEntry2] : Markus SPENCER [de-identified] :  cervical distortion c/w some sort of conization procedure in the past

## 2025-06-03 ENCOUNTER — INPATIENT (INPATIENT)
Facility: HOSPITAL | Age: 65
LOS: 1 days | Discharge: HOME | DRG: 603 | End: 2025-06-05
Attending: STUDENT IN AN ORGANIZED HEALTH CARE EDUCATION/TRAINING PROGRAM | Admitting: STUDENT IN AN ORGANIZED HEALTH CARE EDUCATION/TRAINING PROGRAM
Payer: MEDICAID

## 2025-06-03 VITALS
RESPIRATION RATE: 20 BRPM | DIASTOLIC BLOOD PRESSURE: 68 MMHG | TEMPERATURE: 97 F | OXYGEN SATURATION: 97 % | SYSTOLIC BLOOD PRESSURE: 110 MMHG | WEIGHT: 134.92 LBS | HEART RATE: 65 BPM

## 2025-06-03 DIAGNOSIS — Z90.49 ACQUIRED ABSENCE OF OTHER SPECIFIED PARTS OF DIGESTIVE TRACT: Chronic | ICD-10-CM

## 2025-06-03 DIAGNOSIS — Z98.890 OTHER SPECIFIED POSTPROCEDURAL STATES: Chronic | ICD-10-CM

## 2025-06-03 DIAGNOSIS — L02.221 FURUNCLE OF ABDOMINAL WALL: ICD-10-CM

## 2025-06-03 LAB
ALBUMIN SERPL ELPH-MCNC: 3.8 G/DL — SIGNIFICANT CHANGE UP (ref 3.5–5)
ALP SERPL-CCNC: 74 U/L — SIGNIFICANT CHANGE UP (ref 40–120)
ALT FLD-CCNC: 21 U/L DA — SIGNIFICANT CHANGE UP (ref 10–60)
ANION GAP SERPL CALC-SCNC: 3 MMOL/L — LOW (ref 5–17)
AST SERPL-CCNC: 16 U/L — SIGNIFICANT CHANGE UP (ref 10–40)
BASOPHILS # BLD AUTO: 0.01 K/UL — SIGNIFICANT CHANGE UP (ref 0–0.2)
BASOPHILS NFR BLD AUTO: 0.2 % — SIGNIFICANT CHANGE UP (ref 0–2)
BILIRUB SERPL-MCNC: 0.4 MG/DL — SIGNIFICANT CHANGE UP (ref 0.2–1.2)
BUN SERPL-MCNC: 17 MG/DL — SIGNIFICANT CHANGE UP (ref 7–18)
CALCIUM SERPL-MCNC: 8.9 MG/DL — SIGNIFICANT CHANGE UP (ref 8.4–10.5)
CHLORIDE SERPL-SCNC: 106 MMOL/L — SIGNIFICANT CHANGE UP (ref 96–108)
CO2 SERPL-SCNC: 29 MMOL/L — SIGNIFICANT CHANGE UP (ref 22–31)
CREAT SERPL-MCNC: 0.57 MG/DL — SIGNIFICANT CHANGE UP (ref 0.5–1.3)
EGFR: 103 ML/MIN/1.73M2 — SIGNIFICANT CHANGE UP
EGFR: 103 ML/MIN/1.73M2 — SIGNIFICANT CHANGE UP
EOSINOPHIL # BLD AUTO: 0.08 K/UL — SIGNIFICANT CHANGE UP (ref 0–0.5)
EOSINOPHIL NFR BLD AUTO: 1.4 % — SIGNIFICANT CHANGE UP (ref 0–6)
GLUCOSE SERPL-MCNC: 91 MG/DL — SIGNIFICANT CHANGE UP (ref 70–99)
HCT VFR BLD CALC: 36.2 % — SIGNIFICANT CHANGE UP (ref 34.5–45)
HGB BLD-MCNC: 12.2 G/DL — SIGNIFICANT CHANGE UP (ref 11.5–15.5)
IMM GRANULOCYTES NFR BLD AUTO: 0.2 % — SIGNIFICANT CHANGE UP (ref 0–0.9)
LYMPHOCYTES # BLD AUTO: 2.91 K/UL — SIGNIFICANT CHANGE UP (ref 1–3.3)
LYMPHOCYTES # BLD AUTO: 51.1 % — HIGH (ref 13–44)
MAGNESIUM SERPL-MCNC: 2 MG/DL — SIGNIFICANT CHANGE UP (ref 1.6–2.6)
MCHC RBC-ENTMCNC: 29.4 PG — SIGNIFICANT CHANGE UP (ref 27–34)
MCHC RBC-ENTMCNC: 33.7 G/DL — SIGNIFICANT CHANGE UP (ref 32–36)
MCV RBC AUTO: 87.2 FL — SIGNIFICANT CHANGE UP (ref 80–100)
MONOCYTES # BLD AUTO: 0.42 K/UL — SIGNIFICANT CHANGE UP (ref 0–0.9)
MONOCYTES NFR BLD AUTO: 7.4 % — SIGNIFICANT CHANGE UP (ref 2–14)
NEUTROPHILS # BLD AUTO: 2.27 K/UL — SIGNIFICANT CHANGE UP (ref 1.8–7.4)
NEUTROPHILS NFR BLD AUTO: 39.7 % — LOW (ref 43–77)
NRBC BLD AUTO-RTO: 0 /100 WBCS — SIGNIFICANT CHANGE UP (ref 0–0)
PLATELET # BLD AUTO: 163 K/UL — SIGNIFICANT CHANGE UP (ref 150–400)
POTASSIUM SERPL-MCNC: 3.9 MMOL/L — SIGNIFICANT CHANGE UP (ref 3.5–5.3)
POTASSIUM SERPL-SCNC: 3.9 MMOL/L — SIGNIFICANT CHANGE UP (ref 3.5–5.3)
PROT SERPL-MCNC: 7 G/DL — SIGNIFICANT CHANGE UP (ref 6–8.3)
RBC # BLD: 4.15 M/UL — SIGNIFICANT CHANGE UP (ref 3.8–5.2)
RBC # FLD: 13.6 % — SIGNIFICANT CHANGE UP (ref 10.3–14.5)
SODIUM SERPL-SCNC: 138 MMOL/L — SIGNIFICANT CHANGE UP (ref 135–145)
WBC # BLD: 5.7 K/UL — SIGNIFICANT CHANGE UP (ref 3.8–10.5)
WBC # FLD AUTO: 5.7 K/UL — SIGNIFICANT CHANGE UP (ref 3.8–10.5)

## 2025-06-03 PROCEDURE — 74177 CT ABD & PELVIS W/CONTRAST: CPT | Mod: 26

## 2025-06-03 PROCEDURE — 76705 ECHO EXAM OF ABDOMEN: CPT | Mod: 26

## 2025-06-03 PROCEDURE — 99285 EMERGENCY DEPT VISIT HI MDM: CPT

## 2025-06-03 PROCEDURE — 99222 1ST HOSP IP/OBS MODERATE 55: CPT

## 2025-06-03 RX ORDER — PIPERACILLIN-TAZO-DEXTROSE,ISO 3.375G/5
3.38 IV SOLUTION, PIGGYBACK PREMIX FROZEN(ML) INTRAVENOUS ONCE
Refills: 0 | Status: COMPLETED | OUTPATIENT
Start: 2025-06-03 | End: 2025-06-03

## 2025-06-03 RX ADMIN — Medication 4 MILLIGRAM(S): at 18:46

## 2025-06-03 RX ADMIN — Medication 1000 MILLILITER(S): at 18:46

## 2025-06-03 NOTE — ED PROVIDER NOTE - PROGRESS NOTE DETAILS
CT showing 5.3 x 1.6 cm abdominal wall fluid collection. patient seen by general surgery.  Surgery recommends medical admission for IR evaluation.  No acute surgical intervention.

## 2025-06-03 NOTE — ED PROVIDER NOTE - OBJECTIVE STATEMENT
65-year-old female history of hypertension hyperlipidemia presents ED with right-sided abdominal pain associate with nausea for the past 2 days.  Patient denies similar symptoms previously.  No hematuria, no dysuria, no fever, no chest pain, no shortness of breath.

## 2025-06-03 NOTE — ED PROVIDER NOTE - MDM ORDERS SUBMITTED SELECTION
Start benefiber. once a day  Increase your water intake, try to increase to 64 ounces a day  Avoid artificial sweeteners and caffeine (diet coke)  Stop dairy at this time see if this provides improvement  Follow up with Dr. Inna Barnes for next available for a
Imaging Studies/Medications

## 2025-06-03 NOTE — ED ADULT NURSE NOTE - ED STAT RN HANDOFF DETAILS
Report endorsed to UNIQUE Reyes. Pt sinus aston and MAR aware and repeat EKG was done. NAD done. Abx running.

## 2025-06-03 NOTE — ED PROVIDER NOTE - CLINICAL SUMMARY MEDICAL DECISION MAKING FREE TEXT BOX
62-year-old female history of hypertension hyperlipidemia presents ED with 2 days of right upper quadrant abdominal pain.  Concern for acute cholecystitis.  Will get labs, sono, supportive care, reassess

## 2025-06-03 NOTE — CONSULT NOTE ADULT - ASSESSMENT
64F w 5.3 x 1.6 cm abdominal wall fluid collection  No leukocytosis, afebrile    Plan:  -No acute surgical intervention  -Recommend medical admission  -Recommend IR evaluation/consultation for collection  -Rec NPO after mn for IR eval  -Pain control prn  -Remainder of care per primary team  -Discussed with Dr. Hoffmann

## 2025-06-03 NOTE — ED ADULT NURSE NOTE - OBJECTIVE STATEMENT
Patient c/o of pain to RLQ that started yesterday. Denies N/V/D, chest pain, SOB, blood in urine and stool. No acute distress noted.

## 2025-06-03 NOTE — CONSULT NOTE ADULT - SUBJECTIVE AND OBJECTIVE BOX
General Surgery Consultation    HPI:  64F past medical history of hypertension, hyperlipidemia presents ED with right-sided abdominal pain associate with nausea for the past 2 days.  Patient denies similar symptoms previously. Denies any recent trauma or falls to the area. Patient states the pain began about 2 weeks ago spontaneously and became more severe yesterday. She had been taking tylenol with some relief, as soon as medication wears off pain returns. Reports nausea associated with the pain, but no episodes of emesis. She saw her primary care doctor yesterday who recommended ED evaluation. No fever, no chest pain, no shortness of breath.    Surgical history:   Bilateral breast reduction/abdominoplasty in [facility] 2024  Cholecystectomy  Appendectomy    PAST MEDICAL & SURGICAL HISTORY:  HTN (hypertension)          MEDICATIONS  (STANDING):    MEDICATIONS  (PRN):      Allergies    No Known Allergies    Intolerances        SOCIAL HISTORY:    FAMILY HISTORY:      Vital Signs Last 24 Hrs  T(C): 36.5 (03 Jun 2025 19:00), Max: 36.5 (03 Jun 2025 19:00)  T(F): 97.7 (03 Jun 2025 19:00), Max: 97.7 (03 Jun 2025 19:00)  HR: 49 (03 Jun 2025 19:00) (49 - 65)  BP: 119/80 (03 Jun 2025 19:00) (110/68 - 119/80)  BP(mean): --  RR: 18 (03 Jun 2025 19:00) (18 - 20)  SpO2: 99% (03 Jun 2025 19:00) (97% - 99%)    Parameters below as of 03 Jun 2025 19:00  Patient On (Oxygen Delivery Method): room air        I&O's Summary    T(C): 36.5 (06-03-25 @ 19:00), Max: 36.5 (06-03-25 @ 19:00)  HR: 49 (06-03-25 @ 19:00) (49 - 65)  BP: 119/80 (06-03-25 @ 19:00) (110/68 - 119/80)  RR: 18 (06-03-25 @ 19:00) (18 - 20)  SpO2: 99% (06-03-25 @ 19:00) (97% - 99%)    CONSTITUTIONAL: Well groomed, no apparent distress  RESP: No respiratory distress, no use of accessory muscles  GI: Soft, non distended, right sided lateral abdominal wall focal tenderness just inferior to costal angle without any overlying skin changes, no induration or fluctuance palpated, no rebound, no guarding; no palpable masses; no hepatosplenomegaly; no hernia palpated  Abdominoplasty incisions noted and well healed.    LABS:                        12.2   5.70  )-----------( 163      ( 03 Jun 2025 18:40 )             36.2     06-03    138  |  106  |  17  ----------------------------<  91  3.9   |  29  |  0.57    Ca    8.9      03 Jun 2025 18:40  Mg     2.0     06-03    TPro  7.0  /  Alb  3.8  /  TBili  0.4  /  DBili  x   /  AST  16  /  ALT  21  /  AlkPhos  74  06-03      Urinalysis Basic - ( 03 Jun 2025 18:40 )    Color: x / Appearance: x / SG: x / pH: x  Gluc: 91 mg/dL / Ketone: x  / Bili: x / Urobili: x   Blood: x / Protein: x / Nitrite: x   Leuk Esterase: x / RBC: x / WBC x   Sq Epi: x / Non Sq Epi: x / Bacteria: x      CAPILLARY BLOOD GLUCOSE        LIVER FUNCTIONS - ( 03 Jun 2025 18:40 )  Alb: 3.8 g/dL / Pro: 7.0 g/dL / ALK PHOS: 74 U/L / ALT: 21 U/L DA / AST: 16 U/L / GGT: x             Cultures:      RADIOLOGY & ADDITIONAL STUDIES:  < from: CT Abdomen and Pelvis w/ IV Cont (06.03.25 @ 19:38) >  ACC: [id number] EXAM:  CT ABDOMEN AND PELVIS IC   ORDERED BY: SCOOBY QUACH     PROCEDURE DATE:  06/03/2025          INTERPRETATION:  CLINICAL INFORMATION: Right-sided abdominal pain.    COMPARISON: None.    CONTRAST/COMPLICATIONS:  IV Contrast:Omnipaque 350  90 cc administered   10 cc discarded  Oral Contrast: NONE  .    PROCEDURE:  CT of the Abdomen and Pelvis was performed.  Sagittal and coronal reformats were performed.    FINDINGS:  LOWER CHEST: Within normal limits.    LIVER: Within normal limits.  BILE DUCTS: Mild intra and extrahepatic biliary ductal dilation, likely   related to prior cholecystectomy.  GALLBLADDER: The gallbladder is surgically absent.  SPLEEN: Within normal limits.  PANCREAS: Within normal limits.  ADRENALS: Within normal limits.  KIDNEYS/URETERS: Within normal limits.    BLADDER: Tiny focus of air within the bladder, correlate with recent   instrumentation.  REPRODUCTIVE ORGANS: Bilateral tubal occlusion devices.    BOWEL: No bowel obstruction. Appendix is not visualized. No evidence of   inflammation in the pericecal region.  PERITONEUM/RETROPERITONEUM: Within normal limits.  VESSELS: Within normal limits.  LYMPH NODES: No lymphadenopathy.  ABDOMINAL WALL: 5.3 x 1.6 cm abdominal wall fluid collection(3-54).   Small fat-containing umbilical hernia.  BONES: Within normal limits.    IMPRESSION:    5.3 x 1.6 cm abdominal wall fluid collection.    Tiny focus of air within the bladder, correlate with recent   instrumentation.        --- End of Report ---      < end of copied text >

## 2025-06-03 NOTE — ED ADULT TRIAGE NOTE - MODE OF ARRIVAL
Walk in No Repair - Repaired With Adjacent Surgical Defect Text (Leave Blank If You Do Not Want): After obtaining clear surgical margins the defect was repaired concurrently with another surgical defect which was in close approximation.

## 2025-06-03 NOTE — ED ADULT NURSE NOTE - NSSEPSISNEWALTERMENTAL_ED_A_ED
Health Maintenance   Topic Date Due    Diabetic foot exam  Never done    Diabetic Alb to Cr ratio (uACR) test  Never done    Diabetic retinal exam  Never done    Cervical cancer screen  Never done    Colorectal Cancer Screen  02/15/2023    Lipids  07/01/2023    DTaP/Tdap/Td vaccine (1 - Tdap) 12/07/2024 (Originally 11/17/1991)    Pneumococcal 0-64 years Vaccine (1 of 2 - PCV) 12/07/2024 (Originally 11/17/1978)    Hepatitis C screen  12/07/2024 (Originally 11/17/1990)    HIV screen  12/07/2024 (Originally 11/17/1987)    Hepatitis B vaccine (1 of 3 - 3-dose series) 03/29/2025 (Originally 1972)    Shingles vaccine (1 of 2) 03/29/2025 (Originally 11/17/2022)    COVID-19 Vaccine (5 - 2023-24 season) 03/29/2025 (Originally 9/1/2023)    A1C test (Diabetic or Prediabetic)  12/01/2024    GFR test (Diabetes, CKD 3-4, OR last GFR 15-59)  12/01/2024    Depression Monitoring  03/26/2025    Low dose CT lung screening &/or counseling  06/06/2025    Breast cancer screen  05/08/2026    Flu vaccine  Completed    Hepatitis A vaccine  Aged Out    Hib vaccine  Aged Out    Polio vaccine  Aged Out    Meningococcal (ACWY) vaccine  Aged Out    Diabetes screen  Discontinued             (applicable per patient's age: Cancer Screenings, Depression Screening, Fall Risk Screening, Immunizations)    Hemoglobin A1C (%)   Date Value   12/01/2023 6.0   02/07/2023 5.7   11/08/2022 5.9     AST (U/L)   Date Value   12/01/2023 17     ALT (U/L)   Date Value   12/01/2023 24     BUN (mg/dL)   Date Value   12/01/2023 12      (goal A1C is < 7)   (goal LDL is <100) need 30-50% reduction from baseline     BP Readings from Last 3 Encounters:   04/22/24 137/72   03/29/24 122/70   12/07/23 126/78    (goal /80)      All Future Testing planned in [facility]:  Lab Frequency Next Occurrence   Lipid Panel Once 06/07/2023   Sleep Study with PAP Titration Once 10/11/2023   POCT Fecal Immunochemical Test (FIT) Once 04/19/2024       Next Visit Date:  Future  No

## 2025-06-04 DIAGNOSIS — Z98.890 OTHER SPECIFIED POSTPROCEDURAL STATES: Chronic | ICD-10-CM

## 2025-06-04 DIAGNOSIS — L02.211 CUTANEOUS ABSCESS OF ABDOMINAL WALL: ICD-10-CM

## 2025-06-04 DIAGNOSIS — I25.10 ATHEROSCLEROTIC HEART DISEASE OF NATIVE CORONARY ARTERY WITHOUT ANGINA PECTORIS: ICD-10-CM

## 2025-06-04 DIAGNOSIS — E78.5 HYPERLIPIDEMIA, UNSPECIFIED: ICD-10-CM

## 2025-06-04 DIAGNOSIS — E11.9 TYPE 2 DIABETES MELLITUS WITHOUT COMPLICATIONS: ICD-10-CM

## 2025-06-04 DIAGNOSIS — Z29.9 ENCOUNTER FOR PROPHYLACTIC MEASURES, UNSPECIFIED: ICD-10-CM

## 2025-06-04 DIAGNOSIS — I10 ESSENTIAL (PRIMARY) HYPERTENSION: ICD-10-CM

## 2025-06-04 LAB
A1C WITH ESTIMATED AVERAGE GLUCOSE RESULT: 5.9 % — HIGH (ref 4–5.6)
ALBUMIN SERPL ELPH-MCNC: 3.1 G/DL — LOW (ref 3.5–5)
ALP SERPL-CCNC: 64 U/L — SIGNIFICANT CHANGE UP (ref 40–120)
ALT FLD-CCNC: 33 U/L DA — SIGNIFICANT CHANGE UP (ref 10–60)
ANION GAP SERPL CALC-SCNC: 4 MMOL/L — LOW (ref 5–17)
APPEARANCE UR: CLEAR — SIGNIFICANT CHANGE UP
APTT BLD: 31.4 SEC — SIGNIFICANT CHANGE UP (ref 26.1–36.8)
AST SERPL-CCNC: 46 U/L — HIGH (ref 10–40)
BILIRUB SERPL-MCNC: 0.6 MG/DL — SIGNIFICANT CHANGE UP (ref 0.2–1.2)
BILIRUB UR-MCNC: NEGATIVE — SIGNIFICANT CHANGE UP
BUN SERPL-MCNC: 13 MG/DL — SIGNIFICANT CHANGE UP (ref 7–18)
CALCIUM SERPL-MCNC: 8.5 MG/DL — SIGNIFICANT CHANGE UP (ref 8.4–10.5)
CHLORIDE SERPL-SCNC: 109 MMOL/L — HIGH (ref 96–108)
CO2 SERPL-SCNC: 27 MMOL/L — SIGNIFICANT CHANGE UP (ref 22–31)
COLOR SPEC: YELLOW — SIGNIFICANT CHANGE UP
CREAT SERPL-MCNC: 0.59 MG/DL — SIGNIFICANT CHANGE UP (ref 0.5–1.3)
CRP SERPL-MCNC: <2.9 MG/L — SIGNIFICANT CHANGE UP (ref 0–5)
DIFF PNL FLD: NEGATIVE — SIGNIFICANT CHANGE UP
EGFR: 102 ML/MIN/1.73M2 — SIGNIFICANT CHANGE UP
EGFR: 102 ML/MIN/1.73M2 — SIGNIFICANT CHANGE UP
ESTIMATED AVERAGE GLUCOSE: 123 MG/DL — HIGH (ref 68–114)
GLUCOSE BLDC GLUCOMTR-MCNC: 111 MG/DL — HIGH (ref 70–99)
GLUCOSE BLDC GLUCOMTR-MCNC: 90 MG/DL — SIGNIFICANT CHANGE UP (ref 70–99)
GLUCOSE BLDC GLUCOMTR-MCNC: 97 MG/DL — SIGNIFICANT CHANGE UP (ref 70–99)
GLUCOSE BLDC GLUCOMTR-MCNC: 97 MG/DL — SIGNIFICANT CHANGE UP (ref 70–99)
GLUCOSE BLDC GLUCOMTR-MCNC: 98 MG/DL — SIGNIFICANT CHANGE UP (ref 70–99)
GLUCOSE SERPL-MCNC: 95 MG/DL — SIGNIFICANT CHANGE UP (ref 70–99)
GLUCOSE UR QL: NEGATIVE MG/DL — SIGNIFICANT CHANGE UP
HCT VFR BLD CALC: 34.7 % — SIGNIFICANT CHANGE UP (ref 34.5–45)
HGB BLD-MCNC: 11.3 G/DL — LOW (ref 11.5–15.5)
INR BLD: 0.99 RATIO — SIGNIFICANT CHANGE UP (ref 0.85–1.16)
KETONES UR QL: NEGATIVE MG/DL — SIGNIFICANT CHANGE UP
LEUKOCYTE ESTERASE UR-ACNC: NEGATIVE — SIGNIFICANT CHANGE UP
MAGNESIUM SERPL-MCNC: 2 MG/DL — SIGNIFICANT CHANGE UP (ref 1.6–2.6)
MCHC RBC-ENTMCNC: 29.2 PG — SIGNIFICANT CHANGE UP (ref 27–34)
MCHC RBC-ENTMCNC: 32.6 G/DL — SIGNIFICANT CHANGE UP (ref 32–36)
MCV RBC AUTO: 89.7 FL — SIGNIFICANT CHANGE UP (ref 80–100)
NITRITE UR-MCNC: NEGATIVE — SIGNIFICANT CHANGE UP
NRBC BLD AUTO-RTO: 0 /100 WBCS — SIGNIFICANT CHANGE UP (ref 0–0)
PH UR: 5.5 — SIGNIFICANT CHANGE UP (ref 5–8)
PHOSPHATE SERPL-MCNC: 4.4 MG/DL — SIGNIFICANT CHANGE UP (ref 2.5–4.5)
PLATELET # BLD AUTO: 141 K/UL — LOW (ref 150–400)
POTASSIUM SERPL-MCNC: 4 MMOL/L — SIGNIFICANT CHANGE UP (ref 3.5–5.3)
POTASSIUM SERPL-SCNC: 4 MMOL/L — SIGNIFICANT CHANGE UP (ref 3.5–5.3)
PROT SERPL-MCNC: 5.9 G/DL — LOW (ref 6–8.3)
PROT UR-MCNC: NEGATIVE MG/DL — SIGNIFICANT CHANGE UP
PROTHROM AB SERPL-ACNC: 11.6 SEC — SIGNIFICANT CHANGE UP (ref 9.9–13.4)
RBC # BLD: 3.87 M/UL — SIGNIFICANT CHANGE UP (ref 3.8–5.2)
RBC # FLD: 13.7 % — SIGNIFICANT CHANGE UP (ref 10.3–14.5)
SODIUM SERPL-SCNC: 140 MMOL/L — SIGNIFICANT CHANGE UP (ref 135–145)
SP GR SPEC: 1.06 — HIGH (ref 1–1.03)
UROBILINOGEN FLD QL: 0.2 MG/DL — SIGNIFICANT CHANGE UP (ref 0.2–1)
WBC # BLD: 5.37 K/UL — SIGNIFICANT CHANGE UP (ref 3.8–10.5)
WBC # FLD AUTO: 5.37 K/UL — SIGNIFICANT CHANGE UP (ref 3.8–10.5)

## 2025-06-04 PROCEDURE — 99223 1ST HOSP IP/OBS HIGH 75: CPT | Mod: GC

## 2025-06-04 PROCEDURE — 99223 1ST HOSP IP/OBS HIGH 75: CPT

## 2025-06-04 RX ORDER — GLUCAGON 3 MG/1
1 POWDER NASAL ONCE
Refills: 0 | Status: DISCONTINUED | OUTPATIENT
Start: 2025-06-04 | End: 2025-06-05

## 2025-06-04 RX ORDER — INSULIN LISPRO 100 U/ML
INJECTION, SOLUTION INTRAVENOUS; SUBCUTANEOUS AT BEDTIME
Refills: 0 | Status: DISCONTINUED | OUTPATIENT
Start: 2025-06-04 | End: 2025-06-05

## 2025-06-04 RX ORDER — METRONIDAZOLE 250 MG
500 TABLET ORAL EVERY 12 HOURS
Refills: 0 | Status: DISCONTINUED | OUTPATIENT
Start: 2025-06-04 | End: 2025-06-04

## 2025-06-04 RX ORDER — DEXTROSE 50 % IN WATER 50 %
25 SYRINGE (ML) INTRAVENOUS ONCE
Refills: 0 | Status: DISCONTINUED | OUTPATIENT
Start: 2025-06-04 | End: 2025-06-05

## 2025-06-04 RX ORDER — SODIUM CHLORIDE 9 G/1000ML
1000 INJECTION, SOLUTION INTRAVENOUS
Refills: 0 | Status: DISCONTINUED | OUTPATIENT
Start: 2025-06-04 | End: 2025-06-05

## 2025-06-04 RX ORDER — METRONIDAZOLE 250 MG
500 TABLET ORAL ONCE
Refills: 0 | Status: COMPLETED | OUTPATIENT
Start: 2025-06-04 | End: 2025-06-04

## 2025-06-04 RX ORDER — CEFTRIAXONE 500 MG/1
1000 INJECTION, POWDER, FOR SOLUTION INTRAMUSCULAR; INTRAVENOUS EVERY 24 HOURS
Refills: 0 | Status: DISCONTINUED | OUTPATIENT
Start: 2025-06-04 | End: 2025-06-04

## 2025-06-04 RX ORDER — METRONIDAZOLE 250 MG
TABLET ORAL
Refills: 0 | Status: DISCONTINUED | OUTPATIENT
Start: 2025-06-04 | End: 2025-06-04

## 2025-06-04 RX ORDER — HEPARIN SODIUM 1000 [USP'U]/ML
5000 INJECTION INTRAVENOUS; SUBCUTANEOUS EVERY 8 HOURS
Refills: 0 | Status: DISCONTINUED | OUTPATIENT
Start: 2025-06-04 | End: 2025-06-04

## 2025-06-04 RX ORDER — SENNA 187 MG
2 TABLET ORAL AT BEDTIME
Refills: 0 | Status: DISCONTINUED | OUTPATIENT
Start: 2025-06-04 | End: 2025-06-05

## 2025-06-04 RX ORDER — POLYETHYLENE GLYCOL 3350 17 G/17G
17 POWDER, FOR SOLUTION ORAL DAILY
Refills: 0 | Status: DISCONTINUED | OUTPATIENT
Start: 2025-06-04 | End: 2025-06-05

## 2025-06-04 RX ORDER — DEXTROSE 50 % IN WATER 50 %
12.5 SYRINGE (ML) INTRAVENOUS ONCE
Refills: 0 | Status: DISCONTINUED | OUTPATIENT
Start: 2025-06-04 | End: 2025-06-05

## 2025-06-04 RX ORDER — INSULIN LISPRO 100 U/ML
INJECTION, SOLUTION INTRAVENOUS; SUBCUTANEOUS
Refills: 0 | Status: DISCONTINUED | OUTPATIENT
Start: 2025-06-04 | End: 2025-06-05

## 2025-06-04 RX ORDER — DEXTROSE 50 % IN WATER 50 %
15 SYRINGE (ML) INTRAVENOUS ONCE
Refills: 0 | Status: DISCONTINUED | OUTPATIENT
Start: 2025-06-04 | End: 2025-06-05

## 2025-06-04 RX ORDER — ONDANSETRON HCL/PF 4 MG/2 ML
4 VIAL (ML) INJECTION EVERY 8 HOURS
Refills: 0 | Status: DISCONTINUED | OUTPATIENT
Start: 2025-06-03 | End: 2025-06-05

## 2025-06-04 RX ORDER — ATORVASTATIN CALCIUM 80 MG/1
10 TABLET, FILM COATED ORAL AT BEDTIME
Refills: 0 | Status: DISCONTINUED | OUTPATIENT
Start: 2025-06-04 | End: 2025-06-05

## 2025-06-04 RX ORDER — INSULIN LISPRO 100 U/ML
INJECTION, SOLUTION INTRAVENOUS; SUBCUTANEOUS EVERY 6 HOURS
Refills: 0 | Status: DISCONTINUED | OUTPATIENT
Start: 2025-06-03 | End: 2025-06-04

## 2025-06-04 RX ADMIN — Medication 2 MILLIGRAM(S): at 00:44

## 2025-06-04 RX ADMIN — HEPARIN SODIUM 5000 UNIT(S): 1000 INJECTION INTRAVENOUS; SUBCUTANEOUS at 05:04

## 2025-06-04 RX ADMIN — Medication 2 MILLIGRAM(S): at 16:27

## 2025-06-04 RX ADMIN — Medication 60 MILLILITER(S): at 01:34

## 2025-06-04 RX ADMIN — Medication 2 TABLET(S): at 21:52

## 2025-06-04 RX ADMIN — Medication 200 GRAM(S): at 00:32

## 2025-06-04 RX ADMIN — Medication 1 MILLIGRAM(S): at 05:40

## 2025-06-04 RX ADMIN — Medication 40 MILLIGRAM(S): at 18:47

## 2025-06-04 RX ADMIN — Medication 100 MILLIGRAM(S): at 02:50

## 2025-06-04 RX ADMIN — Medication 1 MILLIGRAM(S): at 06:10

## 2025-06-04 RX ADMIN — Medication 2 MILLIGRAM(S): at 15:57

## 2025-06-04 RX ADMIN — CEFTRIAXONE 100 MILLIGRAM(S): 500 INJECTION, POWDER, FOR SOLUTION INTRAMUSCULAR; INTRAVENOUS at 05:04

## 2025-06-04 RX ADMIN — ATORVASTATIN CALCIUM 10 MILLIGRAM(S): 80 TABLET, FILM COATED ORAL at 21:52

## 2025-06-04 RX ADMIN — Medication 2 MILLIGRAM(S): at 01:14

## 2025-06-04 NOTE — PATIENT PROFILE ADULT - CAREGIVER NAME
Lab Results   Component Value Date    HGBA1C 5.9 (H) 01/10/2025      Patient is pre-diabetic at this time  Has adequate exercise with walking so discussed with patient importance of diet control    Plan  Lifestyle Management discussed  Continue to Monitor Yearly with A1C        Arslan Zabala

## 2025-06-04 NOTE — H&P ADULT - TIME BILLING
review of hospital course, labs, vitals, medical records; bedside exam and interview; discussion of plan and coordinated care with resident/housestaff; documentation of encounter

## 2025-06-04 NOTE — CONSULT NOTE ADULT - TIME BILLING
- Review of records, vital signs and daily labs, Imaging, microbiology and other Infectious Diseases related work up  - General physical examination performed  - Generation of Infectious Diseases treatment plan discussed with attending Physician  - Coordination of care with the multidisciplinary team  -Counseling of the patient and/or family members
Physician time spent included preparing to see patient, obtaining and reviewing separate obtained history, performing a medically appropriate examination and evaluation, counseling and educating the patient/family/caregiver. Ordering medications, tests, procedures, referring and communicating with other health care professionals. In addition,  documenting clinical information in the electronic health record. In addition, independently interpreting results and communicating results to the patient/family/caregiver, and care coordination.

## 2025-06-04 NOTE — H&P ADULT - HISTORY OF PRESENT ILLNESS
Patient is a 63 yo F with pmhx of CAD s/p stents, brain aneurysm, HTN, HLD, DM that coems in for abdominal pain. Patient endorses that for the past week has been having abdominal pain in the right side of the abdomen which promted her to go first to her PCP which prescribed her tylenol and initally had good response resolving the pain. However as the week progressed that pain started becoming more constant and more intense which made her go to the PCP for a second time and was told to go to ER. Patient endorses that the abd pain radiates to the back and last nigh has felt fevers, chills, nausea along with a couple of episodes of vomiting. Pateint denies any other assoicated symptoms such as CP,SOB, diarrhea or urinary symptoms.

## 2025-06-04 NOTE — CONSULT NOTE ADULT - ASSESSMENT
HPI:  Patient is a 65 yo F with pmhx of CAD s/p stents, brain aneurysm, HTN, HLD, DM that coems in for abdominal pain. Patient endorses that for the past week has been having abdominal pain in the right side of the abdomen which promted her to go first to her PCP which prescribed her tylenol and initally had good response resolving the pain. However as the week progressed that pain started becoming more constant and more intense which made her go to the PCP for a second time and was told to go to ER. Patient endorses that the abd pain radiates to the back and last nigh has felt fevers, chills, nausea along with a couple of episodes of vomiting. Pateint denies any other assoicated symptoms such as CP,SOB, diarrhea or urinary symptoms.  (04 Jun 2025 00:08)    Allergies: No Known Allergies    Intolerances: N/A    PAST MEDICAL & SURGICAL HISTORY:  HTN (hypertension)  CAD (coronary artery disease)  HLD (hyperlipidemia)  DM (diabetes mellitus)  Status post breast reduction    SOCIAL HISTORY: No smoking ;no alcohol abuse, no IVDU  FAMILY HISTORY: no pertinent related medical history    REVIEW OF SYSTEMS:  CONSTITUTIONAL:  No fevers or chills, good appetite, good general state  EYES/ENT:  No visual changes;  No vertigo or throat pain   NECK:  No neck pain or stiffness  RESPIRATORY:  No cough, wheezing, hemoptysis; No shortness of breath  CARDIOVASCULAR:  No chest pain or palpitations  GASTROINTESTINAL:    GENITOURINARY:  No dysuria, frequency or hematuria  NEUROLOGICAL:  No HA, no numbness or LE weakness  MSK: no back pain, no joint pain  SKIN:  No itching, no skin rash    PHYSICAL EXAM:  VITALS: Vital Signs Last 24 Hrs  T(C): 36.3 (04 Jun 2025 13:00), Max: 36.5 (03 Jun 2025 19:00)  T(F): 97.3 (04 Jun 2025 13:00), Max: 97.7 (03 Jun 2025 19:00)  HR: 48 (04 Jun 2025 13:00) (44 - 65)  BP: 125/72 (04 Jun 2025 13:00) (103/62 - 125/72)  BP(mean): 90 (04 Jun 2025 13:00) (90 - 90)  RR: 17 (04 Jun 2025 13:00) (17 - 20)  SpO2: 98% (04 Jun 2025 13:00) (97% - 99%)    Parameters below as of 04 Jun 2025 13:00  Patient On (Oxygen Delivery Method): room air    Gen: AOx3, NAD, non-toxic, pleasant  HEAD:  Atraumatic, Normocephalic  EYES: PERRLA, conjunctiva and sclera clear  ENT: Moist mucous membranes  NECK: Supple, No JVD  CV: S1+S2 normal, no murmurs   Resp: Clear bilat, no resp distress, no crackles/wheezes  Abd: Soft, nontender, +BS  Ext: No LE edema, no cyanosis, LE pulses present  : no dysuria   IV/Skin: No thrombophlebitis, no skin rash  Msk: No low back pain, no arthralgias, no joint swelling  Neuro: AAOx3. No focal signs    LABS/DIAGNOSTIC TESTS:                        11.3   5.37  )-----------( 141      ( 04 Jun 2025 05:35 )             34.7     WBC Count: 5.37 K/uL (06-04 @ 05:35)  WBC Count: 5.70 K/uL (06-03 @ 18:40)    06-04    140  |  109[H]  |  13  ----------------------------<  95  4.0   |  27  |  0.59    Ca    8.5      04 Jun 2025 05:35  Phos  4.4     06-04  Mg     2.0     06-04    TPro  5.9[L]  /  Alb  3.1[L]  /  TBili  0.6  /  DBili  x   /  AST  46[H]  /  ALT  33  /  AlkPhos  64  06-04    C-Reactive Protein: <2.9 mg/L (06-04-25 @ 05:35)    Urinalysis with Rflx Culture (06.04.25 @ 00:19)    Urine Appearance: Clear   Color: Yellow   Specific Gravity: 1.062   pH Urine: 5.5   Protein, Urine: Negative mg/dL   Glucose Qualitative, Urine: Negative mg/dL   Ketone , Urine: Negative mg/dL   Blood, Urine: Negative   Bilirubin: Negative   Urobilinogen: 0.2 mg/dL   Leukocyte Esterase Concentration: Negative   Nitrite: Negative    CULTURES: N/A    RADIOLOGY: All pertinent available imaging reviewed  < from: CT Abdomen and Pelvis w/ IV Cont (06.03.25 @ 19:38) >  LOWER CHEST: Within normal limits.    LIVER: Within normal limits.  BILE DUCTS: Mild intra and extrahepatic biliary ductal dilation, likely   related to prior cholecystectomy.  GALLBLADDER: The gallbladder is surgically absent.  SPLEEN: Within normal limits.  PANCREAS: Within normal limits.  ADRENALS: Within normal limits.  KIDNEYS/URETERS: Within normal limits.    BLADDER: Tiny focus of air within the bladder, correlate with recent   instrumentation.  REPRODUCTIVE ORGANS: Bilateral tubal occlusion devices.    BOWEL: No bowel obstruction. Appendix is not visualized. No evidence of   inflammation in the pericecal region.  PERITONEUM/RETROPERITONEUM: Within normal limits.  VESSELS: Within normal limits.  LYMPH NODES: No lymphadenopathy.  ABDOMINAL WALL: 5.3 x 1.6 cm abdominal wall fluid collection(3-54).   Small fat-containing umbilical hernia.  BONES: Within normal limits.    IMPRESSION:    5.3 x 1.6 cm abdominal wall fluid collection.    Tiny focus of air within the bladder, correlate with recent   instrumentation.    ANTIBIOTICS:  cefTRIAXone   IVPB 1000 every 24 hours  metroNIDAZOLE  IVPB 500 every 12 hours  metroNIDAZOLE  IVPB      IMPRESSION:  65 yo female with h/o CAD s/p stent, Brain aneurysm, HTN, HLD, DM presenting for abdominal pain for 1 week  in the ED CT A/P showed  5.3 x 1.6 cm abdominal wall fluid collection.    Afebrile, non toxic, labs reviewed, wnl  ID consulted for abx guidance.    -Abdominal wall collection  -DM     PLAN:    ***** in progress    Please reach ID with any questions or concerns  Dr. Mer Weiss  Available in Teams               HPI:  Patient is a 65 yo F with pmhx of CAD s/p stents, brain aneurysm, HTN, HLD, DM that comes with CC of epigastric, RUQ pain radiated to the back associated to acid reflux related to meals. She reports dyspepsia for months worsening the past week that prompted her to see her PMD who prescribed tylenol with some improvement albeit short lived. Thie week the pain progressed and started becoming more constant and more intense which made her go to the PCP for a second time and was told to go to ER. Denies fever/ chills, no n/v or diarrhea. Patient denies any other associated symptoms such as CP,SOB, diarrhea or urinary symptoms.    ID consulted for incidental finding of CT (small fluid collection on abdominal  wall )  (04 Jun 2025 00:08)    Allergies: No Known Allergies    Intolerances: N/A    PAST MEDICAL & SURGICAL HISTORY:  HTN (hypertension)  CAD (coronary artery disease)  HLD (hyperlipidemia)  DM (diabetes mellitus)  Status post breast reduction    SOCIAL HISTORY: No smoking ;no alcohol abuse, no IVDU  FAMILY HISTORY: no pertinent related medical history    REVIEW OF SYSTEMS:  CONSTITUTIONAL:  No fevers or chills, good appetite, good general state  EYES/ENT:  No visual changes;  No vertigo or throat pain   NECK:  No neck pain or stiffness  RESPIRATORY:  No cough, wheezing, hemoptysis; No shortness of breath  CARDIOVASCULAR:  No chest pain or palpitations  GASTROINTESTINAL:    GENITOURINARY:  No dysuria, frequency or hematuria  NEUROLOGICAL:  No HA, no numbness or LE weakness  MSK: no back pain, no joint pain  SKIN:  No itching, no skin rash    PHYSICAL EXAM:  VITALS: Vital Signs Last 24 Hrs  T(C): 36.3 (04 Jun 2025 13:00), Max: 36.5 (03 Jun 2025 19:00)  T(F): 97.3 (04 Jun 2025 13:00), Max: 97.7 (03 Jun 2025 19:00)  HR: 48 (04 Jun 2025 13:00) (44 - 65)  BP: 125/72 (04 Jun 2025 13:00) (103/62 - 125/72)  BP(mean): 90 (04 Jun 2025 13:00) (90 - 90)  RR: 17 (04 Jun 2025 13:00) (17 - 20)  SpO2: 98% (04 Jun 2025 13:00) (97% - 99%)    Parameters below as of 04 Jun 2025 13:00  Patient On (Oxygen Delivery Method): room air    Gen: AOx3, NAD, non-toxic, pleasant  HEAD:  Atraumatic, Normocephalic  EYES: PERRLA, conjunctiva and sclera clear  ENT: Moist mucous membranes  NECK: Supple, No JVD  CV: S1+S2 normal, no murmurs   Resp: Clear bilat, no resp distress, no crackles/wheezes  Abd: Soft, nontender, +BS  Ext: No LE edema, no cyanosis, LE pulses present  : no dysuria   IV/Skin: No thrombophlebitis, no skin rash  Msk: No low back pain, no arthralgias, no joint swelling  Neuro: AAOx3. No focal signs    LABS/DIAGNOSTIC TESTS:                        11.3   5.37  )-----------( 141      ( 04 Jun 2025 05:35 )             34.7     WBC Count: 5.37 K/uL (06-04 @ 05:35)  WBC Count: 5.70 K/uL (06-03 @ 18:40)    06-04    140  |  109[H]  |  13  ----------------------------<  95  4.0   |  27  |  0.59    Ca    8.5      04 Jun 2025 05:35  Phos  4.4     06-04  Mg     2.0     06-04    TPro  5.9[L]  /  Alb  3.1[L]  /  TBili  0.6  /  DBili  x   /  AST  46[H]  /  ALT  33  /  AlkPhos  64  06-04    C-Reactive Protein: <2.9 mg/L (06-04-25 @ 05:35)    Urinalysis with Rflx Culture (06.04.25 @ 00:19)    Urine Appearance: Clear   Color: Yellow   Specific Gravity: 1.062   pH Urine: 5.5   Protein, Urine: Negative mg/dL   Glucose Qualitative, Urine: Negative mg/dL   Ketone , Urine: Negative mg/dL   Blood, Urine: Negative   Bilirubin: Negative   Urobilinogen: 0.2 mg/dL   Leukocyte Esterase Concentration: Negative   Nitrite: Negative    CULTURES: N/A    RADIOLOGY: All pertinent available imaging reviewed  < from: CT Abdomen and Pelvis w/ IV Cont (06.03.25 @ 19:38) >  LOWER CHEST: Within normal limits.    LIVER: Within normal limits.  BILE DUCTS: Mild intra and extrahepatic biliary ductal dilation, likely   related to prior cholecystectomy.  GALLBLADDER: The gallbladder is surgically absent.  SPLEEN: Within normal limits.  PANCREAS: Within normal limits.  ADRENALS: Within normal limits.  KIDNEYS/URETERS: Within normal limits.    BLADDER: Tiny focus of air within the bladder, correlate with recent   instrumentation.  REPRODUCTIVE ORGANS: Bilateral tubal occlusion devices.    BOWEL: No bowel obstruction. Appendix is not visualized. No evidence of   inflammation in the pericecal region.  PERITONEUM/RETROPERITONEUM: Within normal limits.  VESSELS: Within normal limits.  LYMPH NODES: No lymphadenopathy.  ABDOMINAL WALL: 5.3 x 1.6 cm abdominal wall fluid collection(3-54).   Small fat-containing umbilical hernia.  BONES: Within normal limits.    IMPRESSION:    5.3 x 1.6 cm abdominal wall fluid collection.    Tiny focus of air within the bladder, correlate with recent   instrumentation.    ANTIBIOTICS:  cefTRIAXone   IVPB 1000 every 24 hours  metroNIDAZOLE  IVPB 500 every 12 hours  metroNIDAZOLE  IVPB      IMPRESSION:  Patient is a 65 yo F with pmhx of CAD s/p stents, brain aneurysm, HTN, HLD, DM that comes with CC of epigastric, RUQ pain radiated to the back associated to acid reflux related to meals.       ID consulted for incidental finding of CT (small fluid collection on abdominal  wall )  in the ED CT A/P showed  Mild intra and extrahepatic biliary ductal dilation, likely related to prior cholecystectomy and 5.3 x 1.6 cm abdominal wall fluid collection.    Afebrile, non toxic, labs reviewed, wnl, no elevated BILI    -Abdominal pain and dyspepsia   -Abdominal wall collection   -DM     PLAN:  CT A/P reviewed, IR consulted to evaluate the small anterior abd wall fluid collection, she had abdominoplasty ~ 1 yr ago with sx site well healed, there is no evidence of acute cellulitis on physical examination, there is no infected wound, no oozing. The imaging lacks the typical characteristics of abscess, she is afebrile, no leukocytosis, normal ESR/CRP, no evidence of infection.   Regarding the pt current symptoms I think she will benefit from GI eval and EGD to r/o PUD, gastritis, H. Pylori infection.   Discussed with Dr. Coelho    Please reach ID with any questions or concerns  Dr. Mer Weiss  Available in Teams

## 2025-06-04 NOTE — H&P ADULT - ATTENDING COMMENTS
64yF with PMH CAD s/p PCI (2012), brain aneurysm s/p repair (1999), type 2 DM, HTN, a-fib (not on AC), HLD, Hx lap travis and lap appy (1999), breast reduction and abdominoplasty in [facility] (2024), who presented from home with abdominal pain with associated nausea/vomiting x1 week. She states she was taking Tylenol with initial relief, however, it stopped helping yesterday. Reports that pain radiates to her back, and yesterday developed n/v. Patient was seen by her PCP for worsening pain, and recommended to go to the ED for further evaluation. ROS otherwise negative.      Social Hx: denies tobacco, alcohol, recreational drug use     PE: as above; vitals reviewed, NAD, abdomen soft, RUQ TTP, mild guarding, A+Ox3, no le edema  Labs reviewed: CBC, BMP, Mg, Phos; monitor daily  Images reviewed: CTAP - 5.3 x 1.6 cm abdominal wall fluid collection     #Abdominal pain 2/2 abscess    #Asymptomatic bradycardia   #CAD s/p PCI  (2012)    #Type 2 DM    #HTN   #HLD   #Chronic a-fib    #Hx appendectomy    #Hx cholecystectomy    #Hx brain aneurysm s/p repair    #Hx breast reduction   #Hx abdominoplasty     -started on ceftriaxone, flagyl    -trend CRP   -afebrile, no leukocytosis    -seen by surgery, no acute intervention, recommendation for IR evaluation   -NPO in case of possible abscess drainage    -EKG showing sinus bradycardia, patient asymptomatic    -pain control with morphine    -bowel regimen    -ACHS Q6h while NPO, ISS   -home meds for chronic conditions held while NPO, can resume as appropriate   -please consult IR in AM   -ID Dr. Espinoza consulted   -DVT ppx - heparin

## 2025-06-04 NOTE — ED ADULT NURSE REASSESSMENT NOTE - NS ED NURSE REASSESS COMMENT FT1
Dr. Landin made aware of HR of 49 and pt is in pain. Stated it was okay to give prn 2mg of morphine.

## 2025-06-04 NOTE — H&P ADULT - PROBLEM SELECTOR PLAN 1
-patient presenting with a 1 week hx of abdominal pain that radiates to the back associated with systemic symptoms  -in the ED note meeting sirs   -CT in the ED showing 5.3 x 1.6 cm abdominal wall fluid collection. Tiny focus of air within the bladder, correlate with recent instrumentation.  -surgery evaluated patient which recommend no intervention and recc IR eval  -s/p morphine in the ED   -will continue with morphine regimen with bm regimen   -holding on abx for now   -keeping NPO for now   -IV fluids   -IR consult in the AM -patient presenting with a 1 week hx of abdominal pain that radiates to the back associated with systemic symptoms  -in the ED note meeting sirs   -CT in the ED showing 5.3 x 1.6 cm abdominal wall fluid collection. Tiny focus of air within the bladder, correlate with recent instrumentation.  -surgery evaluated patient which recommend no intervention and recc IR eval  -s/p morphine in the ED   -will continue with morphine regimen with bm regimen   -starting CTX and flagyl  -ID consulted  -keeping NPO for now   -IV fluids   -IR consult in the AM

## 2025-06-04 NOTE — H&P ADULT - PROBLEM SELECTOR PLAN 4
hx of DM on oral meds   -due to being NPO will put on q6 fingersticks   -ISS   -f/u fingersticks ACHS

## 2025-06-04 NOTE — H&P ADULT - NSHPPHYSICALEXAM_GEN_ALL_CORE
T(C): 36.5 (06-03-25 @ 19:00), Max: 36.5 (06-03-25 @ 19:00)  HR: 49 (06-03-25 @ 19:00) (49 - 65)  BP: 119/80 (06-03-25 @ 19:00) (110/68 - 119/80)  RR: 18 (06-03-25 @ 19:00) (18 - 20)  SpO2: 99% (06-03-25 @ 19:00) (97% - 99%)  GENERAL: NAD  HEAD:  Atraumatic, Normocephalic  EYES:  conjunctiva and sclera clear  NECK: Supple, No JVD, Normal thyroid  CHEST/LUNG: Clear to auscultation. Clear to percussion bilaterally; No rales, rhonchi, wheezing, or rubs  HEART: Regular rate and rhythm; No murmurs, rubs, or gallops  ABDOMEN: warmth noted on soft touch, guarding noted on palpation, tenderness to palpation  NERVOUS SYSTEM:  Alert & Oriented X3,    EXTREMITIES:  2+ Peripheral Pulses, No clubbing, cyanosis, or edema  SKIN: warm dry

## 2025-06-04 NOTE — H&P ADULT - PROBLEM SELECTOR PLAN 3
hx of htn on oral meds   -will hold for now in the setting of low BP, keeping NPO   -can restart as needed

## 2025-06-04 NOTE — H&P ADULT - ASSESSMENT
Patient is a 63 yo F with pmhx of CAD s/p stents, brain aneurysm, HTN, HLD, DM that coems in for abdominal pain. Patient to be admitted for abdominal wall abscess

## 2025-06-04 NOTE — PROGRESS NOTE ADULT - ASSESSMENT
64F w 5.3 x 1.6 cm abdominal wall fluid collection  No leukocytosis, afebrile    -No acute surgical intervention  -Recommend IR evaluation/consultation for collection  -Pain control prn  -Remainder of care per primary team  -Discussed with PA covering [phone]

## 2025-06-04 NOTE — H&P ADULT - NSHPREVIEWOFSYSTEMS_GEN_ALL_CORE
- CONSTITUTIONAL: Denies fever and chills  - HEENT: Denies changes in vision and hearing.  - RESPIRATORY: Denies SOB and cough.  - CV: Denies chest pain and palpitations  - GI: abdominal pain, nausea and vomiting   - : Denies dysuria and urinary frequency.  - SKIN: Denies rash and pruritus.  - NEUROLOGICAL: Denies headache and syncope.  - PSYCHIATRIC: Denies recent changes in mood. Denies anxiety and depression.

## 2025-06-05 ENCOUNTER — TRANSCRIPTION ENCOUNTER (OUTPATIENT)
Age: 65
End: 2025-06-05

## 2025-06-05 VITALS
TEMPERATURE: 98 F | DIASTOLIC BLOOD PRESSURE: 75 MMHG | RESPIRATION RATE: 18 BRPM | OXYGEN SATURATION: 99 % | SYSTOLIC BLOOD PRESSURE: 114 MMHG | HEART RATE: 60 BPM

## 2025-06-05 DIAGNOSIS — R10.11 RIGHT UPPER QUADRANT PAIN: ICD-10-CM

## 2025-06-05 DIAGNOSIS — R18.8 OTHER ASCITES: ICD-10-CM

## 2025-06-05 DIAGNOSIS — R10.13 EPIGASTRIC PAIN: ICD-10-CM

## 2025-06-05 LAB
ANION GAP SERPL CALC-SCNC: 4 MMOL/L — LOW (ref 5–17)
BUN SERPL-MCNC: 17 MG/DL — SIGNIFICANT CHANGE UP (ref 7–18)
CALCIUM SERPL-MCNC: 9.1 MG/DL — SIGNIFICANT CHANGE UP (ref 8.4–10.5)
CHLORIDE SERPL-SCNC: 105 MMOL/L — SIGNIFICANT CHANGE UP (ref 96–108)
CO2 SERPL-SCNC: 29 MMOL/L — SIGNIFICANT CHANGE UP (ref 22–31)
CREAT SERPL-MCNC: 0.65 MG/DL — SIGNIFICANT CHANGE UP (ref 0.5–1.3)
EGFR: 99 ML/MIN/1.73M2 — SIGNIFICANT CHANGE UP
EGFR: 99 ML/MIN/1.73M2 — SIGNIFICANT CHANGE UP
GLUCOSE BLDC GLUCOMTR-MCNC: 105 MG/DL — HIGH (ref 70–99)
GLUCOSE BLDC GLUCOMTR-MCNC: 98 MG/DL — SIGNIFICANT CHANGE UP (ref 70–99)
GLUCOSE SERPL-MCNC: 96 MG/DL — SIGNIFICANT CHANGE UP (ref 70–99)
HCT VFR BLD CALC: 39 % — SIGNIFICANT CHANGE UP (ref 34.5–45)
HGB BLD-MCNC: 13 G/DL — SIGNIFICANT CHANGE UP (ref 11.5–15.5)
LIDOCAIN IGE QN: 41 U/L — SIGNIFICANT CHANGE UP (ref 13–75)
MCHC RBC-ENTMCNC: 29.1 PG — SIGNIFICANT CHANGE UP (ref 27–34)
MCHC RBC-ENTMCNC: 33.3 G/DL — SIGNIFICANT CHANGE UP (ref 32–36)
MCV RBC AUTO: 87.4 FL — SIGNIFICANT CHANGE UP (ref 80–100)
NRBC BLD AUTO-RTO: 0 /100 WBCS — SIGNIFICANT CHANGE UP (ref 0–0)
PLATELET # BLD AUTO: 166 K/UL — SIGNIFICANT CHANGE UP (ref 150–400)
POTASSIUM SERPL-MCNC: 4.1 MMOL/L — SIGNIFICANT CHANGE UP (ref 3.5–5.3)
POTASSIUM SERPL-SCNC: 4.1 MMOL/L — SIGNIFICANT CHANGE UP (ref 3.5–5.3)
RBC # BLD: 4.46 M/UL — SIGNIFICANT CHANGE UP (ref 3.8–5.2)
RBC # FLD: 13.5 % — SIGNIFICANT CHANGE UP (ref 10.3–14.5)
SODIUM SERPL-SCNC: 138 MMOL/L — SIGNIFICANT CHANGE UP (ref 135–145)
WBC # BLD: 4.85 K/UL — SIGNIFICANT CHANGE UP (ref 3.8–10.5)
WBC # FLD AUTO: 4.85 K/UL — SIGNIFICANT CHANGE UP (ref 3.8–10.5)

## 2025-06-05 PROCEDURE — 84100 ASSAY OF PHOSPHORUS: CPT

## 2025-06-05 PROCEDURE — 86140 C-REACTIVE PROTEIN: CPT

## 2025-06-05 PROCEDURE — 85027 COMPLETE CBC AUTOMATED: CPT

## 2025-06-05 PROCEDURE — 36415 COLL VENOUS BLD VENIPUNCTURE: CPT

## 2025-06-05 PROCEDURE — 93005 ELECTROCARDIOGRAM TRACING: CPT

## 2025-06-05 PROCEDURE — 76705 ECHO EXAM OF ABDOMEN: CPT

## 2025-06-05 PROCEDURE — 83036 HEMOGLOBIN GLYCOSYLATED A1C: CPT

## 2025-06-05 PROCEDURE — 83690 ASSAY OF LIPASE: CPT

## 2025-06-05 PROCEDURE — 85025 COMPLETE CBC W/AUTO DIFF WBC: CPT

## 2025-06-05 PROCEDURE — 80048 BASIC METABOLIC PNL TOTAL CA: CPT

## 2025-06-05 PROCEDURE — 83735 ASSAY OF MAGNESIUM: CPT

## 2025-06-05 PROCEDURE — 99232 SBSQ HOSP IP/OBS MODERATE 35: CPT

## 2025-06-05 PROCEDURE — 85610 PROTHROMBIN TIME: CPT

## 2025-06-05 PROCEDURE — 96374 THER/PROPH/DIAG INJ IV PUSH: CPT

## 2025-06-05 PROCEDURE — 85730 THROMBOPLASTIN TIME PARTIAL: CPT

## 2025-06-05 PROCEDURE — 99239 HOSP IP/OBS DSCHRG MGMT >30: CPT

## 2025-06-05 PROCEDURE — 80053 COMPREHEN METABOLIC PANEL: CPT

## 2025-06-05 PROCEDURE — 81003 URINALYSIS AUTO W/O SCOPE: CPT

## 2025-06-05 PROCEDURE — 99285 EMERGENCY DEPT VISIT HI MDM: CPT | Mod: 25

## 2025-06-05 PROCEDURE — 82962 GLUCOSE BLOOD TEST: CPT

## 2025-06-05 PROCEDURE — 74177 CT ABD & PELVIS W/CONTRAST: CPT

## 2025-06-05 RX ORDER — SENNA 187 MG
2 TABLET ORAL
Qty: 60 | Refills: 0
Start: 2025-06-05 | End: 2025-07-04

## 2025-06-05 RX ORDER — MECLIZINE HCL 12.5 MG
1 TABLET ORAL
Refills: 0 | DISCHARGE

## 2025-06-05 RX ORDER — POLYETHYLENE GLYCOL 3350 17 G/17G
17 POWDER, FOR SOLUTION ORAL
Qty: 510 | Refills: 0
Start: 2025-06-05 | End: 2025-07-04

## 2025-06-05 RX ORDER — METFORMIN HYDROCHLORIDE 850 MG/1
1 TABLET ORAL
Refills: 0 | DISCHARGE

## 2025-06-05 RX ORDER — ENALAPRIL MALEATE 20 MG
40 TABLET ORAL
Refills: 0 | DISCHARGE

## 2025-06-05 RX ORDER — ASPIRIN 325 MG
1 TABLET ORAL
Refills: 0 | DISCHARGE

## 2025-06-05 RX ORDER — GABAPENTIN 400 MG/1
1 CAPSULE ORAL
Refills: 0 | DISCHARGE

## 2025-06-05 RX ORDER — ATORVASTATIN CALCIUM 80 MG/1
1 TABLET, FILM COATED ORAL
Refills: 0 | DISCHARGE

## 2025-06-05 RX ORDER — ENALAPRIL MALEATE 20 MG
1 TABLET ORAL
Refills: 0 | DISCHARGE

## 2025-06-05 RX ADMIN — POLYETHYLENE GLYCOL 3350 17 GRAM(S): 17 POWDER, FOR SOLUTION ORAL at 11:52

## 2025-06-05 RX ADMIN — Medication 40 MILLIGRAM(S): at 05:23

## 2025-06-05 NOTE — PROGRESS NOTE ADULT - ASSESSMENT
64 female w 5.3 x 1.6 cm abdominal wall fluid collection  No leukocytosis, afebrile    -No acute surgical intervention  -per IR, collection not amenable for perc drainage  -Pain control prn  -Poss incisional hernia to be managed outpt when collection has resolved  -Discussed with PA covering [phone]   64 female w 5.3 x 1.6 cm abdominal wall fluid collection, symptomatic incisional hernia   No leukocytosis, afebrile    -per IR, collection not amenable for perc drainage, will re-discuss with IR for aspiration of abd wall fluid   -Pain control prn  -Poss incisional hernia with mesh if fluid found to be simple fluid  -Discussed with PA covering [phone]

## 2025-06-05 NOTE — PROGRESS NOTE ADULT - ASSESSMENT
Subjective: NAD, afebrile, abd pain and discomfort improved, dyspepsia improved.     MEDS:  atorvastatin 10 milliGRAM(s) Oral at bedtime  dextrose 5%. 1000 milliLiter(s) IV Continuous <Continuous>  dextrose 5%. 1000 milliLiter(s) IV Continuous <Continuous>  dextrose 50% Injectable 25 Gram(s) IV Push once  dextrose 50% Injectable 12.5 Gram(s) IV Push once  dextrose 50% Injectable 25 Gram(s) IV Push once  dextrose Oral Gel 15 Gram(s) Oral once PRN  glucagon  Injectable 1 milliGRAM(s) IntraMuscular once  insulin lispro (ADMELOG) corrective regimen sliding scale   SubCutaneous three times a day before meals  insulin lispro (ADMELOG) corrective regimen sliding scale   SubCutaneous at bedtime  morphine  - Injectable 2 milliGRAM(s) IV Push every 4 hours PRN  morphine  - Injectable 1 milliGRAM(s) IV Push every 6 hours PRN  ondansetron Injectable 4 milliGRAM(s) IV Push every 8 hours PRN  pantoprazole    Tablet 40 milliGRAM(s) Oral before breakfast  polyethylene glycol 3350 17 Gram(s) Oral daily  senna 2 Tablet(s) Oral at bedtime  sodium chloride 0.9%. 1000 milliLiter(s) IV Continuous <Continuous>      REVIEW OF SYSTEMS:  CONSTITUTIONAL:  No fevers or chills  EYES/ENT:  No visual changes; no throat pain   NECK:  No neck pain or stiffness  RESPIRATORY:  No cough, no wheezing. No shortness of breath  CARDIOVASCULAR:  No chest pain or palpitations  GASTROINTESTINAL:  No abdominal pain. No N/V or diarrhea  GENITOURINARY:  No dysuria, frequency or hematuria  NEUROLOGICAL:  No numbness or weakness  MSK: no back pain, no joint pain  SKIN:  No itching, no skin rash    PE:  Vital Signs Last 24 Hrs  T(C): 36.4 (05 Jun 2025 05:31), Max: 36.7 (04 Jun 2025 20:49)  T(F): 97.5 (05 Jun 2025 05:31), Max: 98.1 (04 Jun 2025 20:49)  HR: 60 (05 Jun 2025 05:31) (59 - 60)  BP: 114/75 (05 Jun 2025 05:31) (114/75 - 128/81)  BP(mean): 89 (05 Jun 2025 05:31) (89 - 89)  RR: 18 (05 Jun 2025 05:31) (17 - 18)  SpO2: 99% (05 Jun 2025 05:31) (96% - 99%)    Parameters below as of 05 Jun 2025 05:31  Patient On (Oxygen Delivery Method): room air    Gen: AOx3, NAD, non-toxic, pleasant  HEAD:  Atraumatic  EYES: PERRLA, conjunctiva and sclera clear  ENT: Moist mucous membranes  NECK: Supple, No JVD  CV: S1+S2 normal, no murmurs  Resp: CTA  Abd: Soft, nontender, +BS  Ext: No LE edema, no cyanosis, pulses +  : No dysuria  IV/Skin: No thrombophlebitis  Msk: No low back pain   Neuro: AAOx3. No focal signs    LABS/DIAGNOSTIC TESTS:                        13.0   4.85  )-----------( 166      ( 05 Jun 2025 05:45 )             39.0     WBC Count: 4.85 K/uL (06-05 @ 05:45)  WBC Count: 5.37 K/uL (06-04 @ 05:35)  WBC Count: 5.70 K/uL (06-03 @ 18:40)    06-05    138  |  105  |  17  ----------------------------<  96  4.1   |  29  |  0.65    Ca    9.1      05 Jun 2025 05:45  Phos  4.4     06-04  Mg     2.0     06-04    TPro  5.9[L]  /  Alb  3.1[L]  /  TBili  0.6  /  DBili  x   /  AST  46[H]  /  ALT  33  /  AlkPhos  64  06-04    CULTURES: N/A    RADIOLOGY:   Available pertinent Imaging reviewed    ANTIBIOTICS: N/A    IMPRESSION:  Patient is a 63 yo F with pmhx of CAD s/p stents, brain aneurysm, HTN, HLD, DM that comes with CC of epigastric, RUQ pain radiated to the back associated to acid reflux related to meals.   Of Note she had EGD/Colonoscopy 3 yrs ago with Dx. PUD and H. Pylori (advised repeat test in 5 yrs)      ID consulted for incidental finding of CT (small fluid collection on abdominal  wall)  in the ED CT A/P showed  Mild intra and extrahepatic biliary ductal dilation, likely related to prior cholecystectomy and 5.3 x 1.6 cm abdominal wall fluid collection.    Afebrile, non toxic, labs reviewed, wnl, no elevated BILI    -Abdominal pain and dyspepsia   -PUD s/p EGD Dx and H. Pylori tx 3 yrs ago  -Abdominal wall collection   -DM     PLAN:  As per consult note incidental finding of small ant abdominal fluid collection (Sx and IR consulted and recommended conservative management) consider aspiration of the fluid (surgery will follow OP).  GI for EGD  H. Pylori AG in stool rec  Started on PPI by primary and feeling better, c/w Omeprazole  Counseled  Discussed with Dr. Coelho    Please reach ID with any questions or concerns  Dr. Mer Weiss  Available in Teams

## 2025-06-05 NOTE — DISCHARGE NOTE PROVIDER - CARE PROVIDER_API CALL
Radha Batista  Gastroenterology  [address]  [place] [address]  Phone: [phone]  Fax: [phone]  Follow Up Time: 2 weeks    Edilberto Hoffmann Kai  Surgery  [address], [address]  [place] [address]  Phone: [phone]  Fax: [phone]  Follow Up Time: 2 weeks

## 2025-06-05 NOTE — DISCHARGE NOTE NURSING/CASE MANAGEMENT/SOCIAL WORK - FINANCIAL ASSISTANCE
[facility] provides services at a reduced cost to those who are determined to be eligible through [facility]’s financial assistance program. Information regarding [facility]’s financial assistance program can be found by going to [email or web address] or by calling [phone].

## 2025-06-05 NOTE — DISCHARGE NOTE PROVIDER - HOSPITAL COURSE
63 yo F with pmhx of CAD s/p stents, brain aneurysm, HTN, HLD, DM that comes in for abdominal pain. CT showed 5.3 x 1.6 cm abdominal wall fluid collection. Admitted for concern for abscess, Surgery consulted no surgical intervention recommended. IR consulted for aspiration and possible drainage. per IR: based on the size of collection, drainage is not feasible. Patient remained afebrile, wbc count normal.    Medically optimized for discharge  Discharge discussed with attending

## 2025-06-05 NOTE — DISCHARGE NOTE PROVIDER - NSDCMRMEDTOKEN_GEN_ALL_CORE_FT
amLODIPine 10 mg oral tablet: 1 tab(s) orally once a day  aspirin 325 mg oral tablet: 1 tab(s) orally once a day  aspirin 81 mg oral capsule: 1 cap(s) orally once a day  enalapril: 40 milligram(s) orally once a day  enalapril 20 mg oral tablet: 1 tab(s) orally once a day  metFORMIN 1000 mg oral tablet: 1 tab(s) orally once a day  simvastatin 20 mg oral tablet: 1 tab(s) orally once a day  simvastatin 40 mg oral tablet: 1 tab(s) orally once a day (at bedtime)   aspirin 81 mg oral capsule: 1 cap(s) orally once a day  atorvastatin 20 mg oral tablet: 1 tab(s) orally once a day  enalapril 10 mg oral tablet: 1 tab(s) orally once a day  gabapentin 100 mg oral capsule: 1 cap(s) orally 2 times a day  meclizine 25 mg oral tablet: 1 tab(s) orally 2 times a day  pantoprazole 40 mg oral delayed release tablet: 1 tab(s) orally once a day (before a meal)  polyethylene glycol 3350 oral powder for reconstitution: 17 gram(s) orally once a day  senna leaf extract oral tablet: 2 tab(s) orally once a day (at bedtime)

## 2025-06-05 NOTE — DISCHARGE NOTE PROVIDER - NSDCCPCAREPLAN_GEN_ALL_CORE_FT
PRINCIPAL DISCHARGE DIAGNOSIS  Diagnosis: Abdominal wall abscess  Assessment and Plan of Treatment: You presented to the hospital with abominla pain.  Cat scan was performed and showed 5.3 x 1.6 cm abdominal wall fluid collection  Surgical team and Interventional Radiology team did not recommend drainage or aspiration  You remained afebrile, you were monitored off antibiotics  cat scan of the abd also showed some stool burden which could also contribute to abd pain  to prevent constipation  increase water intake  increase fiber intake  can take over the counter laxatives ocasionally to help with bowel movements  Follow up with PCP withn 1-2 cheri for routine follow up.      SECONDARY DISCHARGE DIAGNOSES  Diagnosis: CAD (coronary artery disease)  Assessment and Plan of Treatment: contnue home medications as prescribed    Diagnosis: DM (diabetes mellitus)  Assessment and Plan of Treatment: a1c: 5.9  glucose is well controlled  continue home medications as presribed  HgA1C this admission.  Make sure you get your HgA1c checked every three months.  If you take oral diabetes medications, check your blood glucose two times a day.  Low blood sugar (hypoglycemia) is a blood sugar below 70mg/dl. Check your blood sugar if you feel signs/symptoms of hypoglycemia. If your blood sugar is below 70 take 15 grams of carbohydrates (ex 4 oz of apple juice, 3-4 glucose tablets, or 4-6 oz of regular soda) wait 15 minutes and repeat blood sugar to make sure it comes up above 70.  If your blood sugar is above 70 and you are due for a meal, have a meal.  If you are not due for a meal have a snack.  This snack helps keeps your blood sugar at a safe range.      Diagnosis: HLD (hyperlipidemia)  Assessment and Plan of Treatment: continue home medications as prescribed    Diagnosis: HTN (hypertension)  Assessment and Plan of Treatment: Your blood pressiures has been controlled on your currrent regimen, continue home medications as prescribed.  Activity as tolerated.  Follow up with your medical doctor for routine blood pressure monitoring at your next visit.  Notify your doctor if you have any of the following symptoms:   Dizziness, Lightheadedness, Blurry vision, Headache, Chest pain, Shortness of breath    Diagnosis: Umbilical hernia  Assessment and Plan of Treatment: You were noted to have Small fat-containing umbilical hernia on cat scan. You can follow up with Surgery team outpatient for consideration of hernia repair       PRINCIPAL DISCHARGE DIAGNOSIS  Diagnosis: Abdominal wall fluid collections  Assessment and Plan of Treatment: You presented to the hospital with abominla pain.  Cat scan was performed and showed 5.3 x 1.6 cm abdominal wall fluid collection  Surgical team and Interventional Radiology team did not recommend drainage or aspiration  You remained afebrile, you were monitored off antibiotics with no signs of infection  cat scan of the abd also showed some stool burden which could also contribute to abd pain  to prevent constipation  increase water intake  increase fiber intake  can take over the counter laxatives ocasionally to help with bowel movements  Follow up with PCP withn 1-2 cheri for routine follow up.      SECONDARY DISCHARGE DIAGNOSES  Diagnosis: HTN (hypertension)  Assessment and Plan of Treatment: Your blood pressiures has been controlled on your currrent regimen, continue home medications as prescribed.  Activity as tolerated.  Follow up with your medical doctor for routine blood pressure monitoring at your next visit.  Notify your doctor if you have any of the following symptoms:   Dizziness, Lightheadedness, Blurry vision, Headache, Chest pain, Shortness of breath    Diagnosis: CAD (coronary artery disease)  Assessment and Plan of Treatment: contnue home medications as prescribed    Diagnosis: DM (diabetes mellitus)  Assessment and Plan of Treatment: a1c: 5.9  glucose is well controlled  continue home medications as presribed  HgA1C this admission.  Make sure you get your HgA1c checked every three months.  If you take oral diabetes medications, check your blood glucose two times a day.  Low blood sugar (hypoglycemia) is a blood sugar below 70mg/dl. Check your blood sugar if you feel signs/symptoms of hypoglycemia. If your blood sugar is below 70 take 15 grams of carbohydrates (ex 4 oz of apple juice, 3-4 glucose tablets, or 4-6 oz of regular soda) wait 15 minutes and repeat blood sugar to make sure it comes up above 70.  If your blood sugar is above 70 and you are due for a meal, have a meal.  If you are not due for a meal have a snack.  This snack helps keeps your blood sugar at a safe range.      Diagnosis: HLD (hyperlipidemia)  Assessment and Plan of Treatment: continue home medications as prescribed    Diagnosis: Umbilical hernia  Assessment and Plan of Treatment: You were noted to have Small fat-containing umbilical hernia on cat scan. You can follow up with Surgery team outpatient for consideration of hernia repair      Diagnosis: History of Helicobacter pylori infection  Assessment and Plan of Treatment: You presented with epigastric pain and a history of H.pylori infection that was treated. We started you on pantoprazole which you are to continue at home. Please follow up with GI for further gastritis/H. Pylori work up.

## 2025-06-05 NOTE — PROGRESS NOTE ADULT - SUBJECTIVE AND OBJECTIVE BOX
63 yo female admitted with abdominal pain, found to have a small anterior abdominal collection.  IR consulted for drainage.  Images reviewed with Dr. Ledesma.  Based on the size of this collection, drainage is not feasible.  Recommend warm compresses and is clinical picture worsens (currently patient is afebrile with a normal white count), will consider aspiration.
INTERVAL HPI/OVERNIGHT EVENTS:  Pt resting comfortably.   c/o improved but persistent abd pain.  Tolerating liquids.  Denies N/V.    MEDICATIONS  (STANDING):  atorvastatin 10 milliGRAM(s) Oral at bedtime  dextrose 5%. 1000 milliLiter(s) (50 mL/Hr) IV Continuous <Continuous>  dextrose 5%. 1000 milliLiter(s) (100 mL/Hr) IV Continuous <Continuous>  dextrose 50% Injectable 25 Gram(s) IV Push once  dextrose 50% Injectable 12.5 Gram(s) IV Push once  dextrose 50% Injectable 25 Gram(s) IV Push once  glucagon  Injectable 1 milliGRAM(s) IntraMuscular once  insulin lispro (ADMELOG) corrective regimen sliding scale   SubCutaneous three times a day before meals  insulin lispro (ADMELOG) corrective regimen sliding scale   SubCutaneous at bedtime  pantoprazole    Tablet 40 milliGRAM(s) Oral before breakfast  polyethylene glycol 3350 17 Gram(s) Oral daily  senna 2 Tablet(s) Oral at bedtime  sodium chloride 0.9%. 1000 milliLiter(s) (60 mL/Hr) IV Continuous <Continuous>    MEDICATIONS  (PRN):  dextrose Oral Gel 15 Gram(s) Oral once PRN Blood Glucose LESS THAN 70 milliGRAM(s)/deciliter  morphine  - Injectable 2 milliGRAM(s) IV Push every 4 hours PRN Severe Pain (7 - 10)  morphine  - Injectable 1 milliGRAM(s) IV Push every 6 hours PRN Moderate Pain (4 - 6)  ondansetron Injectable 4 milliGRAM(s) IV Push every 8 hours PRN Nausea and/or Vomiting    Vital Signs Last 24 Hrs  T(C): 36.4 (05 Jun 2025 05:31), Max: 36.7 (04 Jun 2025 20:49)  T(F): 97.5 (05 Jun 2025 05:31), Max: 98.1 (04 Jun 2025 20:49)  HR: 60 (05 Jun 2025 05:31) (48 - 60)  BP: 114/75 (05 Jun 2025 05:31) (114/75 - 128/81)  BP(mean): 89 (05 Jun 2025 05:31) (89 - 90)  RR: 18 (05 Jun 2025 05:31) (17 - 18)  SpO2: 99% (05 Jun 2025 05:31) (96% - 99%)    Parameters below as of 05 Jun 2025 05:31  Patient On (Oxygen Delivery Method): room air    Physical:  General: A&Ox3. NAD.  Abdomen: Soft nondistended, nontender.    LABS:                        13.0   4.85  )-----------( 166      ( 05 Jun 2025 05:45 )             39.0             06-05    138  |  105  |  17  ----------------------------<  96  4.1   |  29  |  0.65    Ca    9.1      05 Jun 2025 05:45  Phos  4.4     06-04  Mg     2.0     06-04    TPro  5.9[L]  /  Alb  3.1[L]  /  TBili  0.6  /  DBili  x   /  AST  46[H]  /  ALT  33  /  AlkPhos  64  06-04    
INTERVAL HPI/OVERNIGHT EVENTS:  Pt resting comfortably. Pain controlled with meds.       MEDICATIONS  (STANDING):  atorvastatin 10 milliGRAM(s) Oral at bedtime  cefTRIAXone   IVPB 1000 milliGRAM(s) IV Intermittent every 24 hours  insulin lispro (ADMELOG) corrective regimen sliding scale   SubCutaneous every 6 hours  metroNIDAZOLE  IVPB 500 milliGRAM(s) IV Intermittent every 12 hours  metroNIDAZOLE  IVPB      polyethylene glycol 3350 17 Gram(s) Oral daily  senna 2 Tablet(s) Oral at bedtime  sodium chloride 0.9%. 1000 milliLiter(s) (60 mL/Hr) IV Continuous <Continuous>    MEDICATIONS  (PRN):  morphine  - Injectable 2 milliGRAM(s) IV Push every 4 hours PRN Severe Pain (7 - 10)  morphine  - Injectable 1 milliGRAM(s) IV Push every 6 hours PRN Moderate Pain (4 - 6)  ondansetron Injectable 4 milliGRAM(s) IV Push every 8 hours PRN Nausea and/or Vomiting      Vital Signs Last 24 Hrs  T(C): 36.3 (04 Jun 2025 13:00), Max: 36.5 (03 Jun 2025 19:00)  T(F): 97.3 (04 Jun 2025 13:00), Max: 97.7 (03 Jun 2025 19:00)  HR: 48 (04 Jun 2025 13:00) (44 - 65)  BP: 125/72 (04 Jun 2025 13:00) (103/62 - 125/72)  BP(mean): 90 (04 Jun 2025 13:00) (90 - 90)  RR: 17 (04 Jun 2025 13:00) (17 - 20)  SpO2: 98% (04 Jun 2025 13:00) (97% - 99%)    Parameters below as of 04 Jun 2025 13:00  Patient On (Oxygen Delivery Method): room air    Physical:  General: A&Ox3. NAD.  Abdomen: Soft nondistended, tender in right mid-abdomen    LABS:                      11.3   5.37  )-----------( 141      ( 04 Jun 2025 05:35 )             34.7             06-04    140  |  109[H]  |  13  ----------------------------<  95  4.0   |  27  |  0.59    Ca    8.5      04 Jun 2025 05:35  Phos  4.4     06-04  Mg     2.0     06-04    TPro  5.9[L]  /  Alb  3.1[L]  /  TBili  0.6  /  DBili  x   /  AST  46[H]  /  ALT  33  /  AlkPhos  64  06-04

## 2025-06-05 NOTE — DISCHARGE NOTE NURSING/CASE MANAGEMENT/SOCIAL WORK - PATIENT PORTAL LINK FT
You can access the FollowMyHealth Patient Portal offered by [facility] by registering at the following website: [email or web address]. By joining [facility]’s FollowMyHealth portal, you will also be able to view your health information using other applications (apps) compatible with our system.

## 2025-06-05 NOTE — DISCHARGE NOTE PROVIDER - NSDCFUADDAPPT_GEN_ALL_CORE_FT
APPTS ARE READY TO BE MADE: [X] YES    Best Family or Patient Contact (if needed):    Additional Information about above appointments (if needed):    1: Gastroenterology  2: General Surgery  3:     Other comments or requests:

## 2025-06-05 NOTE — DISCHARGE NOTE PROVIDER - PROVIDER TOKENS
PROVIDER:[TOKEN:[[id number]],FOLLOWUP:[2 weeks]],PROVIDER:[TOKEN:[[id number]],FOLLOWUP:[2 weeks]]

## 2025-06-05 NOTE — DISCHARGE NOTE PROVIDER - CARE PROVIDERS DIRECT ADDRESSES
[email or web address]